# Patient Record
Sex: MALE | Race: WHITE | HISPANIC OR LATINO | URBAN - METROPOLITAN AREA
[De-identification: names, ages, dates, MRNs, and addresses within clinical notes are randomized per-mention and may not be internally consistent; named-entity substitution may affect disease eponyms.]

---

## 2019-11-20 ENCOUNTER — EMERGENCY (EMERGENCY)
Facility: HOSPITAL | Age: 64
LOS: 1 days | Discharge: HOME | End: 2019-11-20
Attending: EMERGENCY MEDICINE
Payer: MEDICARE

## 2019-11-20 VITALS
SYSTOLIC BLOOD PRESSURE: 125 MMHG | DIASTOLIC BLOOD PRESSURE: 85 MMHG | HEART RATE: 96 BPM | OXYGEN SATURATION: 100 % | TEMPERATURE: 98 F | HEIGHT: 63 IN | WEIGHT: 119.93 LBS | RESPIRATION RATE: 18 BRPM

## 2019-11-20 LAB
ACETONE SERPL-MCNC: ABNORMAL
ALBUMIN SERPL ELPH-MCNC: 3.8 G/DL — SIGNIFICANT CHANGE UP (ref 3.3–5.2)
ALP SERPL-CCNC: 195 U/L — HIGH (ref 40–120)
ALT FLD-CCNC: 17 U/L — SIGNIFICANT CHANGE UP
ANION GAP SERPL CALC-SCNC: 15 MMOL/L — SIGNIFICANT CHANGE UP (ref 5–17)
APPEARANCE UR: CLEAR — SIGNIFICANT CHANGE UP
AST SERPL-CCNC: 20 U/L — SIGNIFICANT CHANGE UP
BACTERIA # UR AUTO: ABNORMAL
BILIRUB SERPL-MCNC: 0.4 MG/DL — SIGNIFICANT CHANGE UP (ref 0.4–2)
BILIRUB UR-MCNC: NEGATIVE — SIGNIFICANT CHANGE UP
BUN SERPL-MCNC: 18 MG/DL — SIGNIFICANT CHANGE UP (ref 8–20)
CALCIUM SERPL-MCNC: 8.9 MG/DL — SIGNIFICANT CHANGE UP (ref 8.6–10.2)
CHLORIDE SERPL-SCNC: 90 MMOL/L — LOW (ref 98–107)
CO2 SERPL-SCNC: 26 MMOL/L — SIGNIFICANT CHANGE UP (ref 22–29)
COLOR SPEC: YELLOW — SIGNIFICANT CHANGE UP
CREAT SERPL-MCNC: 0.69 MG/DL — SIGNIFICANT CHANGE UP (ref 0.5–1.3)
DIFF PNL FLD: ABNORMAL
EPI CELLS # UR: SIGNIFICANT CHANGE UP
GLUCOSE SERPL-MCNC: 606 MG/DL — CRITICAL HIGH (ref 70–115)
GLUCOSE UR QL: 1000 MG/DL
HCT VFR BLD CALC: 40.2 % — SIGNIFICANT CHANGE UP (ref 39–50)
HGB BLD-MCNC: 13.1 G/DL — SIGNIFICANT CHANGE UP (ref 13–17)
KETONES UR-MCNC: ABNORMAL
LACTATE BLDV-MCNC: 1.5 MMOL/L — SIGNIFICANT CHANGE UP (ref 0.5–2)
LEUKOCYTE ESTERASE UR-ACNC: ABNORMAL
MAGNESIUM SERPL-MCNC: 2.1 MG/DL — SIGNIFICANT CHANGE UP (ref 1.8–2.6)
MCHC RBC-ENTMCNC: 28.5 PG — SIGNIFICANT CHANGE UP (ref 27–34)
MCHC RBC-ENTMCNC: 32.6 GM/DL — SIGNIFICANT CHANGE UP (ref 32–36)
MCV RBC AUTO: 87.4 FL — SIGNIFICANT CHANGE UP (ref 80–100)
NITRITE UR-MCNC: NEGATIVE — SIGNIFICANT CHANGE UP
OSMOLALITY SERPL: 314 MOSMOL/KG — HIGH (ref 280–301)
PH UR: 6.5 — SIGNIFICANT CHANGE UP (ref 5–8)
PLATELET # BLD AUTO: 147 K/UL — LOW (ref 150–400)
POTASSIUM SERPL-MCNC: 5 MMOL/L — SIGNIFICANT CHANGE UP (ref 3.5–5.3)
POTASSIUM SERPL-SCNC: 5 MMOL/L — SIGNIFICANT CHANGE UP (ref 3.5–5.3)
PROT SERPL-MCNC: 7 G/DL — SIGNIFICANT CHANGE UP (ref 6.6–8.7)
PROT UR-MCNC: 30 MG/DL
RBC # BLD: 4.6 M/UL — SIGNIFICANT CHANGE UP (ref 4.2–5.8)
RBC # FLD: 12.7 % — SIGNIFICANT CHANGE UP (ref 10.3–14.5)
RBC CASTS # UR COMP ASSIST: SIGNIFICANT CHANGE UP /HPF (ref 0–4)
SODIUM SERPL-SCNC: 131 MMOL/L — LOW (ref 135–145)
SP GR SPEC: 1.01 — SIGNIFICANT CHANGE UP (ref 1.01–1.02)
TROPONIN T SERPL-MCNC: <0.01 NG/ML — SIGNIFICANT CHANGE UP (ref 0–0.06)
UROBILINOGEN FLD QL: NEGATIVE MG/DL — SIGNIFICANT CHANGE UP
WBC # BLD: 6.73 K/UL — SIGNIFICANT CHANGE UP (ref 3.8–10.5)
WBC # FLD AUTO: 6.73 K/UL — SIGNIFICANT CHANGE UP (ref 3.8–10.5)
WBC UR QL: >50

## 2019-11-20 PROCEDURE — 84484 ASSAY OF TROPONIN QUANT: CPT

## 2019-11-20 PROCEDURE — 99284 EMERGENCY DEPT VISIT MOD MDM: CPT

## 2019-11-20 PROCEDURE — 83605 ASSAY OF LACTIC ACID: CPT

## 2019-11-20 PROCEDURE — 93010 ELECTROCARDIOGRAM REPORT: CPT

## 2019-11-20 PROCEDURE — 36415 COLL VENOUS BLD VENIPUNCTURE: CPT

## 2019-11-20 PROCEDURE — 83735 ASSAY OF MAGNESIUM: CPT

## 2019-11-20 PROCEDURE — 96375 TX/PRO/DX INJ NEW DRUG ADDON: CPT

## 2019-11-20 PROCEDURE — 80053 COMPREHEN METABOLIC PANEL: CPT

## 2019-11-20 PROCEDURE — 82962 GLUCOSE BLOOD TEST: CPT

## 2019-11-20 PROCEDURE — 93005 ELECTROCARDIOGRAM TRACING: CPT

## 2019-11-20 PROCEDURE — 99284 EMERGENCY DEPT VISIT MOD MDM: CPT | Mod: 25

## 2019-11-20 PROCEDURE — 71046 X-RAY EXAM CHEST 2 VIEWS: CPT

## 2019-11-20 PROCEDURE — 96374 THER/PROPH/DIAG INJ IV PUSH: CPT

## 2019-11-20 PROCEDURE — 83930 ASSAY OF BLOOD OSMOLALITY: CPT

## 2019-11-20 PROCEDURE — 71046 X-RAY EXAM CHEST 2 VIEWS: CPT | Mod: 26

## 2019-11-20 PROCEDURE — 81001 URINALYSIS AUTO W/SCOPE: CPT

## 2019-11-20 PROCEDURE — 85027 COMPLETE CBC AUTOMATED: CPT

## 2019-11-20 PROCEDURE — 82009 KETONE BODYS QUAL: CPT

## 2019-11-20 RX ORDER — INSULIN HUMAN 100 [IU]/ML
6 INJECTION, SOLUTION SUBCUTANEOUS ONCE
Refills: 0 | Status: COMPLETED | OUTPATIENT
Start: 2019-11-20 | End: 2019-11-20

## 2019-11-20 RX ORDER — CEFTRIAXONE 500 MG/1
1000 INJECTION, POWDER, FOR SOLUTION INTRAMUSCULAR; INTRAVENOUS ONCE
Refills: 0 | Status: COMPLETED | OUTPATIENT
Start: 2019-11-20 | End: 2019-11-20

## 2019-11-20 RX ORDER — SODIUM CHLORIDE 9 MG/ML
1000 INJECTION INTRAMUSCULAR; INTRAVENOUS; SUBCUTANEOUS ONCE
Refills: 0 | Status: COMPLETED | OUTPATIENT
Start: 2019-11-20 | End: 2019-11-20

## 2019-11-20 RX ORDER — CEPHALEXIN 500 MG
1 CAPSULE ORAL
Qty: 28 | Refills: 0
Start: 2019-11-20 | End: 2019-11-26

## 2019-11-20 RX ADMIN — INSULIN HUMAN 6 UNIT(S): 100 INJECTION, SOLUTION SUBCUTANEOUS at 19:37

## 2019-11-20 RX ADMIN — SODIUM CHLORIDE 1000 MILLILITER(S): 9 INJECTION INTRAMUSCULAR; INTRAVENOUS; SUBCUTANEOUS at 17:39

## 2019-11-20 RX ADMIN — CEFTRIAXONE 100 MILLIGRAM(S): 500 INJECTION, POWDER, FOR SOLUTION INTRAMUSCULAR; INTRAVENOUS at 19:16

## 2019-11-20 RX ADMIN — SODIUM CHLORIDE 1000 MILLILITER(S): 9 INJECTION INTRAMUSCULAR; INTRAVENOUS; SUBCUTANEOUS at 17:40

## 2019-11-20 NOTE — ED ADULT NURSE REASSESSMENT NOTE - NS ED NURSE REASSESS COMMENT FT1
Received 64 yr old male high glucose iv infusing well zhang patient in good spirts in no distress insulin given at this time

## 2019-11-20 NOTE — ED ADULT TRIAGE NOTE - CHIEF COMPLAINT QUOTE
Pt states "I've been feeling very weak and I fell down a couple of times", denies pain, denies chest pain/SOB

## 2019-11-20 NOTE — ED STATDOCS - PROGRESS NOTE DETAILS
65 y/o M pt with PMHx of neuropathy, HTN, DM presents to the ED c/o weakness. Reports weakness for several days and several episodes of syncope. During recent episode of syncope, wife caught patient before he hit the floor. Reports not having an appetite or eating much the past week. Denies chest pain, SOB. Denies hitting head. Pt will be placed in the main ED for complete evaluation by another provider.

## 2019-11-20 NOTE — ED PROVIDER NOTE - PATIENT PORTAL LINK FT
You can access the FollowMyHealth Patient Portal offered by [facility] by registering at the following website: [email or web address]. By joining [employer]’s FollowMyHealth portal, you will also be able to view your health information using other applications (apps) compatible with our system.

## 2019-11-20 NOTE — ED PROVIDER NOTE - OBJECTIVE STATEMENT
64yoM; with pmh signif for HTN, DM (insulin dependent); now p/w generalized malaise x3-4 days. decreased PO intake for 1 week 2/2 no appetite. denies cp/sob/palp. denies abd pain. denies n/v/d. denies f/c/s. denies headache. denies numnbess/tingling. denies focal weakness.  PMH: HTN, DM, CAD(s/p pci x1)  SOCIAL: No tobacco/illicit substance use/socialEtOH

## 2019-11-20 NOTE — ED PROVIDER NOTE - CARE PLAN
Principal Discharge DX:	Hyperglycemia without ketosis  Secondary Diagnosis:	UTI (urinary tract infection)

## 2019-12-01 ENCOUNTER — OUTPATIENT (OUTPATIENT)
Dept: OUTPATIENT SERVICES | Facility: HOSPITAL | Age: 64
LOS: 1 days | End: 2019-12-01
Payer: MEDICARE

## 2019-12-01 PROCEDURE — G9001: CPT

## 2019-12-10 DIAGNOSIS — Z71.89 OTHER SPECIFIED COUNSELING: ICD-10-CM

## 2020-05-20 ENCOUNTER — APPOINTMENT (OUTPATIENT)
Dept: VASCULAR SURGERY | Facility: CLINIC | Age: 65
End: 2020-05-20
Payer: MEDICARE

## 2020-05-20 VITALS
TEMPERATURE: 98 F | OXYGEN SATURATION: 99 % | BODY MASS INDEX: 22.5 KG/M2 | HEIGHT: 63 IN | DIASTOLIC BLOOD PRESSURE: 89 MMHG | SYSTOLIC BLOOD PRESSURE: 147 MMHG | HEART RATE: 92 BPM | WEIGHT: 127 LBS

## 2020-05-20 DIAGNOSIS — I73.9 PERIPHERAL VASCULAR DISEASE, UNSPECIFIED: ICD-10-CM

## 2020-05-20 PROCEDURE — 99202 OFFICE O/P NEW SF 15 MIN: CPT

## 2020-05-20 NOTE — PHYSICAL EXAM
[1+] : left 1+ [2+] : left 2+ [FreeTextEntry1] : Cap ref 2 secs.  [de-identified] : No open wounds.  [de-identified] : No paresthesias

## 2020-05-20 NOTE — HISTORY OF PRESENT ILLNESS
[FreeTextEntry1] : 63 yo m with hx of DM and neuropathy. Sent by PCP for PVD - screening eval. He is asymptomatic from LE standpoint. Walks and occasionally has pain on his big toes and legs. He just healed a wound on his L 1st toe without any problems.

## 2020-05-20 NOTE — ASSESSMENT
[FreeTextEntry1] : 63 YO M with hx of DM and paresthesias. No clinical evidence of PVD on PE. Recently healed a L toe wound.  [Foot care/Footwear] : foot care/footwear

## 2020-09-29 ENCOUNTER — EMERGENCY (EMERGENCY)
Facility: HOSPITAL | Age: 65
LOS: 1 days | Discharge: HOME | End: 2020-09-29
Attending: EMERGENCY MEDICINE
Payer: MEDICARE

## 2020-09-29 VITALS
HEART RATE: 100 BPM | WEIGHT: 119.93 LBS | RESPIRATION RATE: 20 BRPM | SYSTOLIC BLOOD PRESSURE: 158 MMHG | DIASTOLIC BLOOD PRESSURE: 97 MMHG | TEMPERATURE: 98 F | HEIGHT: 63 IN | OXYGEN SATURATION: 97 %

## 2020-09-29 VITALS
TEMPERATURE: 98 F | DIASTOLIC BLOOD PRESSURE: 78 MMHG | HEART RATE: 87 BPM | OXYGEN SATURATION: 98 % | RESPIRATION RATE: 20 BRPM | SYSTOLIC BLOOD PRESSURE: 146 MMHG

## 2020-09-29 LAB
ACETONE SERPL-MCNC: NEGATIVE — SIGNIFICANT CHANGE UP
ALBUMIN SERPL ELPH-MCNC: 3.7 G/DL — SIGNIFICANT CHANGE UP (ref 3.3–5.2)
ALP SERPL-CCNC: 84 U/L — SIGNIFICANT CHANGE UP (ref 40–120)
ALT FLD-CCNC: 10 U/L — SIGNIFICANT CHANGE UP
ANION GAP SERPL CALC-SCNC: 32 MMOL/L — HIGH (ref 5–17)
AST SERPL-CCNC: 28 U/L — SIGNIFICANT CHANGE UP
BASOPHILS # BLD AUTO: 0.01 K/UL — SIGNIFICANT CHANGE UP (ref 0–0.2)
BASOPHILS NFR BLD AUTO: 0.1 % — SIGNIFICANT CHANGE UP (ref 0–2)
BILIRUB SERPL-MCNC: 0.9 MG/DL — SIGNIFICANT CHANGE UP (ref 0.4–2)
BUN SERPL-MCNC: 31 MG/DL — HIGH (ref 8–20)
CALCIUM SERPL-MCNC: 8.8 MG/DL — SIGNIFICANT CHANGE UP (ref 8.6–10.2)
CHLORIDE SERPL-SCNC: 84 MMOL/L — LOW (ref 98–107)
CO2 SERPL-SCNC: 19 MMOL/L — LOW (ref 22–29)
CREAT SERPL-MCNC: 1.18 MG/DL — SIGNIFICANT CHANGE UP (ref 0.5–1.3)
EOSINOPHIL # BLD AUTO: 0 K/UL — SIGNIFICANT CHANGE UP (ref 0–0.5)
EOSINOPHIL NFR BLD AUTO: 0 % — SIGNIFICANT CHANGE UP (ref 0–6)
GAS PNL BLDV: SIGNIFICANT CHANGE UP
GLUCOSE SERPL-MCNC: 714 MG/DL — CRITICAL HIGH (ref 70–99)
HCO3 BLDV-SCNC: 21 MMOL/L — SIGNIFICANT CHANGE UP (ref 21–29)
HCT VFR BLD CALC: 41.2 % — SIGNIFICANT CHANGE UP (ref 39–50)
HGB BLD-MCNC: 13.6 G/DL — SIGNIFICANT CHANGE UP (ref 13–17)
IMM GRANULOCYTES NFR BLD AUTO: 0.6 % — SIGNIFICANT CHANGE UP (ref 0–1.5)
LIDOCAIN IGE QN: 17 U/L — LOW (ref 22–51)
LYMPHOCYTES # BLD AUTO: 0.95 K/UL — LOW (ref 1–3.3)
LYMPHOCYTES # BLD AUTO: 10.6 % — LOW (ref 13–44)
MCHC RBC-ENTMCNC: 27.9 PG — SIGNIFICANT CHANGE UP (ref 27–34)
MCHC RBC-ENTMCNC: 33 GM/DL — SIGNIFICANT CHANGE UP (ref 32–36)
MCV RBC AUTO: 84.6 FL — SIGNIFICANT CHANGE UP (ref 80–100)
MONOCYTES # BLD AUTO: 0.61 K/UL — SIGNIFICANT CHANGE UP (ref 0–0.9)
MONOCYTES NFR BLD AUTO: 6.8 % — SIGNIFICANT CHANGE UP (ref 2–14)
NEUTROPHILS # BLD AUTO: 7.38 K/UL — SIGNIFICANT CHANGE UP (ref 1.8–7.4)
NEUTROPHILS NFR BLD AUTO: 81.9 % — HIGH (ref 43–77)
PCO2 BLDV: 41 MMHG — SIGNIFICANT CHANGE UP (ref 35–50)
PH BLDV: 7.33 — SIGNIFICANT CHANGE UP (ref 7.32–7.43)
PLATELET # BLD AUTO: 146 K/UL — LOW (ref 150–400)
PO2 BLDV: 57 MMHG — HIGH (ref 25–45)
POTASSIUM SERPL-MCNC: 3.9 MMOL/L — SIGNIFICANT CHANGE UP (ref 3.5–5.3)
POTASSIUM SERPL-SCNC: 3.9 MMOL/L — SIGNIFICANT CHANGE UP (ref 3.5–5.3)
PROT SERPL-MCNC: 7.5 G/DL — SIGNIFICANT CHANGE UP (ref 6.6–8.7)
RBC # BLD: 4.87 M/UL — SIGNIFICANT CHANGE UP (ref 4.2–5.8)
RBC # FLD: 14.4 % — SIGNIFICANT CHANGE UP (ref 10.3–14.5)
SAO2 % BLDV: 86 % — SIGNIFICANT CHANGE UP
SODIUM SERPL-SCNC: 135 MMOL/L — SIGNIFICANT CHANGE UP (ref 135–145)
TSH SERPL-MCNC: 6.05 UIU/ML — HIGH (ref 0.27–4.2)
WBC # BLD: 9 K/UL — SIGNIFICANT CHANGE UP (ref 3.8–10.5)
WBC # FLD AUTO: 9 K/UL — SIGNIFICANT CHANGE UP (ref 3.8–10.5)

## 2020-09-29 PROCEDURE — 93010 ELECTROCARDIOGRAM REPORT: CPT

## 2020-09-29 PROCEDURE — 99284 EMERGENCY DEPT VISIT MOD MDM: CPT | Mod: 25

## 2020-09-29 PROCEDURE — 71045 X-RAY EXAM CHEST 1 VIEW: CPT | Mod: 26

## 2020-09-29 PROCEDURE — 99285 EMERGENCY DEPT VISIT HI MDM: CPT

## 2020-09-29 PROCEDURE — 71045 X-RAY EXAM CHEST 1 VIEW: CPT

## 2020-09-29 PROCEDURE — 36415 COLL VENOUS BLD VENIPUNCTURE: CPT

## 2020-09-29 PROCEDURE — 96374 THER/PROPH/DIAG INJ IV PUSH: CPT

## 2020-09-29 PROCEDURE — 82803 BLOOD GASES ANY COMBINATION: CPT

## 2020-09-29 PROCEDURE — 83690 ASSAY OF LIPASE: CPT

## 2020-09-29 PROCEDURE — 80053 COMPREHEN METABOLIC PANEL: CPT

## 2020-09-29 PROCEDURE — 84443 ASSAY THYROID STIM HORMONE: CPT

## 2020-09-29 PROCEDURE — 82009 KETONE BODYS QUAL: CPT

## 2020-09-29 PROCEDURE — 82962 GLUCOSE BLOOD TEST: CPT

## 2020-09-29 PROCEDURE — 85025 COMPLETE CBC W/AUTO DIFF WBC: CPT

## 2020-09-29 PROCEDURE — 93005 ELECTROCARDIOGRAM TRACING: CPT

## 2020-09-29 RX ORDER — ONDANSETRON 8 MG/1
1 TABLET, FILM COATED ORAL
Qty: 9 | Refills: 0
Start: 2020-09-29 | End: 2020-10-01

## 2020-09-29 RX ORDER — INSULIN HUMAN 100 [IU]/ML
12 INJECTION, SOLUTION SUBCUTANEOUS ONCE
Refills: 0 | Status: COMPLETED | OUTPATIENT
Start: 2020-09-29 | End: 2020-09-29

## 2020-09-29 RX ORDER — SODIUM CHLORIDE 9 MG/ML
2000 INJECTION, SOLUTION INTRAVENOUS ONCE
Refills: 0 | Status: COMPLETED | OUTPATIENT
Start: 2020-09-29 | End: 2020-09-29

## 2020-09-29 RX ORDER — ONDANSETRON 8 MG/1
4 TABLET, FILM COATED ORAL ONCE
Refills: 0 | Status: COMPLETED | OUTPATIENT
Start: 2020-09-29 | End: 2020-09-29

## 2020-09-29 RX ADMIN — INSULIN HUMAN 12 UNIT(S): 100 INJECTION, SOLUTION SUBCUTANEOUS at 03:18

## 2020-09-29 RX ADMIN — SODIUM CHLORIDE 4000 MILLILITER(S): 9 INJECTION, SOLUTION INTRAVENOUS at 01:53

## 2020-09-29 RX ADMIN — ONDANSETRON 4 MILLIGRAM(S): 8 TABLET, FILM COATED ORAL at 01:53

## 2020-09-29 RX ADMIN — INSULIN HUMAN 12 UNIT(S): 100 INJECTION, SOLUTION SUBCUTANEOUS at 02:08

## 2020-09-29 NOTE — ED ADULT NURSE NOTE - OBJECTIVE STATEMENT
Pt. presents alert and oriented x 4 to ED complaining of N/V and dizziness x 3 days. Pt. reports he has not taken his PO medications due to the vomiting, or taken is insulin because he is "too weak." Pt. reports he does not check his blood glucose at home. Noted dark brown emesis. Pt. denies fevers, body aches, chills. Pt. WREN, skin warm and dry, cap refill < 2 seconds. Pt. in NSR on cardiac monitor. Breathing spontaneous and unlabored on room air. VSS.

## 2020-09-29 NOTE — ED PROVIDER NOTE - CLINICAL SUMMARY MEDICAL DECISION MAKING FREE TEXT BOX
Rule out DKA; Fluid resuscitation ekg nml blood sugar improved has insulin at home tolerating po fluids f/u pcp later today without fail advised to pt and pt lesley antiemetics written for like GE abd soft nt nd return to ed for intractable chest pain sob abd pain or unable to tolerate po fluids pt and pts jonathan agree to plan of care

## 2020-09-29 NOTE — ED PROVIDER NOTE - PATIENT PORTAL LINK FT
You can access the FollowMyHealth Patient Portal offered by [facility] by registering at the following website: [email or web address]. By joining [employer]’s FollowMyHealth portal, you will also be able to view your health information using other applications (apps) compatible with our system.

## 2020-09-29 NOTE — ED ADULT TRIAGE NOTE - CHIEF COMPLAINT QUOTE
patient from home with complaints of difficulty breathing which started 2 days ago along with vomiting. patient states that he is supposed to take insulin but hasn't taken it in 3 days because he is so weak. denies any complaints of pain or discomfort at this time.

## 2020-09-29 NOTE — ED PROVIDER NOTE - OBJECTIVE STATEMENT
66 y/o male with PMHx of CAD, Depression, DM, and HTN on Novalog presents to ED c/o difficulty breathing. Patient reports 2-3 days of dizziness, N/V/D. Patient was in [facility] for the same issue in November 2019. Patient has not been able to take his medications because of the vomiting.    denies fever. denies HA or neck pain. no chest pain or sob. no abd pain. no urinary f/u/d. no back pain. no motor or sensory deficits. denies illicit drug use. no recent travel. no rash. no other acute issues symptoms or concerns

## 2020-09-29 NOTE — ED ADULT NURSE NOTE - CHPI ED NUR SYMPTOMS NEG
no burning urination/no abdominal distension/no blood in stool/no chills/no diarrhea/no dysuria/no fever/no hematuria

## 2020-09-29 NOTE — ED PROVIDER NOTE - ENMT, MLM
Airway patent, Nasal mucosa clear. Dry oral mucous membranes. Throat has no vesicles, no oropharyngeal exudates and uvula is midline.

## 2020-09-29 NOTE — ED PROVIDER NOTE - PMH
CAD (Coronary Artery Disease)  LAD occlusion  Depression    Diabetes    HTN (Hypertension)  controled with meds

## 2020-12-29 DIAGNOSIS — Z01.818 ENCOUNTER FOR OTHER PREPROCEDURAL EXAMINATION: ICD-10-CM

## 2020-12-31 NOTE — H&P PST ADULT - NSICDXPROBLEM_GEN_ALL_CORE_FT
PROBLEM DIAGNOSES  Problem: Abnormal nuclear stress test  Assessment and Plan: [facility]

## 2020-12-31 NOTE — H&P PST ADULT - HISTORY OF PRESENT ILLNESS
65 year old male with h/o HTN, HLD, DM, CAD with remote single-vessel CABG and cardiomyopathy with CHF with recent admission to [facility] for systolic heart failure and started on Entresto. Pt underwent a NST which was positive.  For LHC to assess coronary anatomy and possible cause of cardiomyopathy.     Symptoms:        Angina (Class):        Ischemic Symptoms:     Heart Failure:        Systolic/Diastolic/Combined:        NYHA Class (within 2 weeks):     Assessment of LVEF:       EF: 33%       Assessed by: NST       Date: 10/02/20    Prior Cardiac Interventions:       PCI's:        CABG: single vessel CABG    Noninvasive Testing:   Stress Test: Date: 10/2/20       Protocol: Reggadenoson        Symptoms: SOB, nausea       EKG Changes: none       Myocardial Imaging: mid to basal inferior and basal inferoseptal infarction with small area of mild inferoseptal evan-infarct ischemia.  Global hypokinesis.  EF 33%       Risk Assessment: Moderate      Antianginal Therapies:        Beta Blockers:         Calcium Channel Blockers:        Long Acting Nitrates:        Ranexa:     Associated Risk Factors:        Cerebrovascular Disease: N/A       Chronic Lung Disease: N/A       Peripheral Arterial Disease: N/A       Chronic Kidney Disease (if yes, what is GFR): N/A       Uncontrolled Diabetes (if yes, what is HgbA1C or FBS): N/A       Poorly Controlled Hypertension (if yes, what is SBP): N/A       Morbid Obesity (if yes, what is BMI): N/A       History of Recent Ventricular Arrhythmia: N/A       Inability to Ambulate Safely: N/A       Need for Therapeutic Anticoagulation: N/A       Antiplatelet or Contrast Allergy: N/A 65 year old male with h/o HTN, HLD, DM, CAD s/p cardiac cath 4/14/10 with confirmed LAD occlusion now s/p single-vessel CABG 4/2010 with Dr. Lafleur, cardiomyopathy with CHF with recent admission to [facility] for systolic heart failure and started on Entresto. Pt underwent a NST which was positive.  For [facility] to assess coronary anatomy and possible cause of cardiomyopathy.     Symptoms:        Angina (Class):        Ischemic Symptoms:     Heart Failure:        Systolic/Diastolic/Combined:        NYHA Class (within 2 weeks):     Assessment of LVEF:       EF: 33%       Assessed by: NST       Date: 10/02/20    Prior Cardiac Interventions:       PCI's:        CABG: single vessel CABG    Noninvasive Testing:   Stress Test: Date: 10/2/20       Protocol: Reggadenoson        Symptoms: SOB, nausea       EKG Changes: none       Myocardial Imaging: mid to basal inferior and basal inferoseptal infarction with small area of mild inferoseptal evan-infarct ischemia.  Global hypokinesis.  EF 33%       Risk Assessment: Moderate      Antianginal Therapies:        Beta Blockers:         Calcium Channel Blockers:        Long Acting Nitrates:        Ranexa:     Associated Risk Factors:        Cerebrovascular Disease: N/A       Chronic Lung Disease: N/A       Peripheral Arterial Disease: N/A       Chronic Kidney Disease (if yes, what is GFR): N/A       Uncontrolled Diabetes (if yes, what is HgbA1C or FBS): N/A       Poorly Controlled Hypertension (if yes, what is SBP): N/A       Morbid Obesity (if yes, what is BMI): N/A       History of Recent Ventricular Arrhythmia: N/A       Inability to Ambulate Safely: N/A       Need for Therapeutic Anticoagulation: N/A       Antiplatelet or Contrast Allergy: N/A 65 year old male with h/o HTN, HLD, DM, CAD s/p cardiac cath 4/14/10 with confirmed LAD occlusion now s/p single-vessel CABG 4/2010 with Dr. Lafleur, cardiomyopathy with CHF with recent admission to [facility] for systolic heart failure and started on Entresto. Pt underwent a NST which which showed global hypokinesis of the remaining wall segments and small reversible defect of mild intensity of the inferoseptal wall consistent with per-infarct ischemia.  For [facility] to assess coronary anatomy and possible cause of cardiomyopathy.       ASA  Mallampati   GFR  Creat  Bleeding Risk  COVID-19  negative 1/2/21     Symptoms:        Angina (Class):        Ischemic Symptoms:     Heart Failure:        Systolic/Diastolic/Combined:        NYHA Class (within 2 weeks):     Assessment of LVEF:       EF: 33%       Assessed by: NST       Date: 10/02/20    Prior Cardiac Interventions:       PCI's:        CABG: single vessel CABG    Noninvasive Testing:   Stress Test: Date: 10/2/20       Protocol: Reggadenoson        Symptoms: SOB, nausea       EKG Changes: none       Myocardial Imaging: mid to basal inferior and basal inferoseptal infarction with small area of mild inferoseptal evan-infarct ischemia.  Global hypokinesis.  EF 33%       Risk Assessment: Moderate      Antianginal Therapies:        Beta Blockers:         Calcium Channel Blockers:        Long Acting Nitrates:        Ranexa:     Associated Risk Factors:        Cerebrovascular Disease: N/A       Chronic Lung Disease: N/A       Peripheral Arterial Disease: N/A       Chronic Kidney Disease (if yes, what is GFR): N/A       Uncontrolled Diabetes (if yes, what is HgbA1C or FBS): N/A       Poorly Controlled Hypertension (if yes, what is SBP): N/A       Morbid Obesity (if yes, what is BMI): N/A       History of Recent Ventricular Arrhythmia: N/A       Inability to Ambulate Safely: N/A       Need for Therapeutic Anticoagulation: N/A       Antiplatelet or Contrast Allergy: N/A 65 year old male with h/o HTN, HLD, DM, CAD s/p cardiac cath 4/14/10 with confirmed LAD occlusion now s/p single-vessel CABG 4/2010 with Dr. Lafleur, cardiomyopathy with CHF with recent admission to [facility] for systolic heart failure and started on Entresto. Pt underwent a NST which which showed global hypokinesis of the remaining wall segments and small reversible defect of mild intensity of the inferoseptal wall consistent with per-infarct ischemia.  For [facility] to assess coronary anatomy and possible cause of cardiomyopathy.       ASA   2  Mallampati    2  GFR  Creat  Bleeding Risk  COVID-19  negative 1/2/21     Symptoms:        Angina (Class):        Ischemic Symptoms:     Heart Failure:        Systolic/Diastolic/Combined:        NYHA Class (within 2 weeks):     Assessment of LVEF:       EF: 33%       Assessed by: NST       Date: 10/02/20    Prior Cardiac Interventions:       PCI's:        CABG: single vessel CABG    Noninvasive Testing:   Stress Test: Date: 10/2/20       Protocol: Reggadenoson        Symptoms: SOB, nausea       EKG Changes: none       Myocardial Imaging: mid to basal inferior and basal inferoseptal infarction with small area of mild inferoseptal evan-infarct ischemia.  Global hypokinesis.  EF 33%       Risk Assessment: Moderate      Antianginal Therapies:        Beta Blockers:         Calcium Channel Blockers:        Long Acting Nitrates:        Ranexa:     Associated Risk Factors:        Cerebrovascular Disease: N/A       Chronic Lung Disease: N/A       Peripheral Arterial Disease: N/A       Chronic Kidney Disease (if yes, what is GFR): N/A       Uncontrolled Diabetes (if yes, what is HgbA1C or FBS): N/A       Poorly Controlled Hypertension (if yes, what is SBP): N/A       Morbid Obesity (if yes, what is BMI): N/A       History of Recent Ventricular Arrhythmia: N/A       Inability to Ambulate Safely: N/A       Need for Therapeutic Anticoagulation: N/A       Antiplatelet or Contrast Allergy: N/A

## 2020-12-31 NOTE — H&P PST ADULT - LAST STRESS TEST
10/2/20 mid to basal inferior and basal inferoseptal infarction with small area of mild inferoseptal evan-infarct ischemia.  Global hypokinesis.  EF 33%

## 2020-12-31 NOTE — H&P PST ADULT - NSICDXPASTSURGICALHX_GEN_ALL_CORE_FT
PAST SURGICAL HISTORY:  S/P CABG (coronary artery bypass graft)     S/P Inguinal Hernia Repair ishaan with mesh 2008

## 2020-12-31 NOTE — H&P PST ADULT - RS GEN PE MLT RESP DETAILS PC
airway patent/breath sounds equal/respirations non-labored/clear to auscultation bilaterally/no rhonchi/no wheezes

## 2020-12-31 NOTE — H&P PST ADULT - NSICDXPASTMEDICALHX_GEN_ALL_CORE_FT
PAST MEDICAL HISTORY:  Abnormal nuclear stress test     CAD (Coronary Artery Disease) LAD occlusion    Depression     Diabetes     HTN (Hypertension) controled with meds

## 2020-12-31 NOTE — H&P PST ADULT - ASSESSMENT
65 year old male with h/o HTN, HLD, DM, CABG and CHF with abnormal stress test for LHC to assess coronary anatomy and cause of EF 33%.    ASA  Mallampati  GFR  Bleeding  risk 65 year old male with h/o HTN, HLD, DM, CABG and CHF with abnormal stress test for [facility] to assess coronary anatomy and cause of EF 33%.    -Patient seen and examined  -Labs and EKG reviewed   -Pre-procedure teaching completed with patient, questions answered   -Informed consent to be obtained by attending   -pt instructed IF STENT PLACED WILL REQUIRE TO STAY OVERNIGHT FOR MONITORING AND DISCHARGED IN THE AM    -cardiac rehab info provided if stent is placed recommended to start if needed post PCI

## 2021-01-01 ENCOUNTER — APPOINTMENT (OUTPATIENT)
Dept: DISASTER EMERGENCY | Facility: CLINIC | Age: 66
End: 2021-01-01

## 2021-01-01 ENCOUNTER — LABORATORY RESULT (OUTPATIENT)
Age: 66
End: 2021-01-01

## 2021-01-01 ENCOUNTER — TRANSCRIPTION ENCOUNTER (OUTPATIENT)
Age: 66
End: 2021-01-01

## 2021-01-01 ENCOUNTER — APPOINTMENT (OUTPATIENT)
Dept: NEUROLOGY | Facility: CLINIC | Age: 66
End: 2021-01-01
Payer: MEDICARE

## 2021-01-01 ENCOUNTER — INPATIENT (INPATIENT)
Facility: HOSPITAL | Age: 66
LOS: 3 days | Discharge: HOME | DRG: 391 | End: 2021-12-20
Attending: HOSPITALIST | Admitting: INTERNAL MEDICINE
Payer: MEDICARE

## 2021-01-01 ENCOUNTER — APPOINTMENT (OUTPATIENT)
Dept: PULMONOLOGY | Facility: CLINIC | Age: 66
End: 2021-01-01

## 2021-01-01 ENCOUNTER — NON-APPOINTMENT (OUTPATIENT)
Age: 66
End: 2021-01-01

## 2021-01-01 ENCOUNTER — EMERGENCY (EMERGENCY)
Facility: HOSPITAL | Age: 66
LOS: 1 days | Discharge: HOME | End: 2021-01-01
Attending: EMERGENCY MEDICINE
Payer: MEDICARE

## 2021-01-01 ENCOUNTER — OUTPATIENT (OUTPATIENT)
Dept: OUTPATIENT SERVICES | Facility: HOSPITAL | Age: 66
LOS: 1 days | Discharge: HOME | End: 2021-01-01
Payer: MEDICARE

## 2021-01-01 ENCOUNTER — FORM ENCOUNTER (OUTPATIENT)
Age: 66
End: 2021-01-01

## 2021-01-01 ENCOUNTER — INPATIENT (INPATIENT)
Facility: HOSPITAL | Age: 66
LOS: 2 days | Discharge: HOME | DRG: 637 | End: 2022-01-02
Attending: STUDENT IN AN ORGANIZED HEALTH CARE EDUCATION/TRAINING PROGRAM | Admitting: INTERNAL MEDICINE
Payer: MEDICARE

## 2021-01-01 VITALS
OXYGEN SATURATION: 100 % | WEIGHT: 132.06 LBS | TEMPERATURE: 98 F | DIASTOLIC BLOOD PRESSURE: 76 MMHG | HEIGHT: 63 IN | HEART RATE: 88 BPM | SYSTOLIC BLOOD PRESSURE: 122 MMHG | RESPIRATION RATE: 18 BRPM

## 2021-01-01 VITALS
TEMPERATURE: 98 F | OXYGEN SATURATION: 99 % | DIASTOLIC BLOOD PRESSURE: 64 MMHG | HEIGHT: 63 IN | HEART RATE: 72 BPM | RESPIRATION RATE: 18 BRPM | WEIGHT: 133.16 LBS | SYSTOLIC BLOOD PRESSURE: 115 MMHG

## 2021-01-01 VITALS
HEART RATE: 94 BPM | BODY MASS INDEX: 23.57 KG/M2 | DIASTOLIC BLOOD PRESSURE: 71 MMHG | SYSTOLIC BLOOD PRESSURE: 127 MMHG | TEMPERATURE: 98 F | HEIGHT: 63 IN | OXYGEN SATURATION: 95 % | WEIGHT: 133 LBS

## 2021-01-01 VITALS
TEMPERATURE: 98 F | OXYGEN SATURATION: 99 % | WEIGHT: 134 LBS | HEART RATE: 80 BPM | BODY MASS INDEX: 23.74 KG/M2 | DIASTOLIC BLOOD PRESSURE: 65 MMHG | HEIGHT: 63 IN | SYSTOLIC BLOOD PRESSURE: 119 MMHG

## 2021-01-01 VITALS
HEART RATE: 81 BPM | TEMPERATURE: 98.2 F | BODY MASS INDEX: 23.74 KG/M2 | OXYGEN SATURATION: 97 % | SYSTOLIC BLOOD PRESSURE: 134 MMHG | HEIGHT: 63 IN | DIASTOLIC BLOOD PRESSURE: 70 MMHG | WEIGHT: 134 LBS

## 2021-01-01 VITALS
BODY MASS INDEX: 23.57 KG/M2 | OXYGEN SATURATION: 98 % | SYSTOLIC BLOOD PRESSURE: 100 MMHG | HEART RATE: 92 BPM | HEIGHT: 63 IN | DIASTOLIC BLOOD PRESSURE: 60 MMHG | TEMPERATURE: 97.8 F | WEIGHT: 133 LBS

## 2021-01-01 VITALS
OXYGEN SATURATION: 97 % | SYSTOLIC BLOOD PRESSURE: 143 MMHG | HEART RATE: 74 BPM | HEIGHT: 63 IN | DIASTOLIC BLOOD PRESSURE: 74 MMHG | TEMPERATURE: 98.4 F | BODY MASS INDEX: 23.74 KG/M2 | WEIGHT: 134 LBS

## 2021-01-01 VITALS
DIASTOLIC BLOOD PRESSURE: 74 MMHG | RESPIRATION RATE: 22 BRPM | OXYGEN SATURATION: 100 % | HEART RATE: 77 BPM | HEIGHT: 63 IN | SYSTOLIC BLOOD PRESSURE: 131 MMHG

## 2021-01-01 VITALS
SYSTOLIC BLOOD PRESSURE: 192 MMHG | HEART RATE: 95 BPM | WEIGHT: 156.97 LBS | HEIGHT: 63 IN | RESPIRATION RATE: 20 BRPM | TEMPERATURE: 99 F | OXYGEN SATURATION: 99 % | DIASTOLIC BLOOD PRESSURE: 112 MMHG

## 2021-01-01 VITALS
DIASTOLIC BLOOD PRESSURE: 75 MMHG | SYSTOLIC BLOOD PRESSURE: 164 MMHG | OXYGEN SATURATION: 98 % | TEMPERATURE: 98 F | HEART RATE: 85 BPM | RESPIRATION RATE: 17 BRPM

## 2021-01-01 VITALS
HEART RATE: 91 BPM | HEIGHT: 63 IN | TEMPERATURE: 98.2 F | BODY MASS INDEX: 23.39 KG/M2 | WEIGHT: 132 LBS | SYSTOLIC BLOOD PRESSURE: 138 MMHG | OXYGEN SATURATION: 98 % | DIASTOLIC BLOOD PRESSURE: 81 MMHG

## 2021-01-01 DIAGNOSIS — Z86.79 PERSONAL HISTORY OF OTHER DISEASES OF THE CIRCULATORY SYSTEM: ICD-10-CM

## 2021-01-01 DIAGNOSIS — G40.802 OTHER EPILEPSY, NOT INTRACTABLE, W/OUT STATUS EPILEPTICUS: ICD-10-CM

## 2021-01-01 DIAGNOSIS — Z95.1 PRESENCE OF AORTOCORONARY BYPASS GRAFT: Chronic | ICD-10-CM

## 2021-01-01 DIAGNOSIS — I34.0 NONRHEUMATIC MITRAL (VALVE) INSUFFICIENCY: ICD-10-CM

## 2021-01-01 DIAGNOSIS — Z86.69 PERSONAL HISTORY OF OTHER DISEASES OF THE NERVOUS SYSTEM AND SENSE ORGANS: ICD-10-CM

## 2021-01-01 DIAGNOSIS — H54.7 UNSPECIFIED VISUAL LOSS: ICD-10-CM

## 2021-01-01 DIAGNOSIS — R11.10 VOMITING, UNSPECIFIED: ICD-10-CM

## 2021-01-01 DIAGNOSIS — I63.9 CEREBRAL INFARCTION, UNSPECIFIED: ICD-10-CM

## 2021-01-01 DIAGNOSIS — Z79.4 TYPE 2 DIABETES MELLITUS WITH DIABETIC POLYNEUROPATHY: ICD-10-CM

## 2021-01-01 DIAGNOSIS — R55 SYNCOPE AND COLLAPSE: ICD-10-CM

## 2021-01-01 DIAGNOSIS — Z97.3 PRESENCE OF SPECTACLES AND CONTACT LENSES: ICD-10-CM

## 2021-01-01 DIAGNOSIS — E11.42 TYPE 2 DIABETES MELLITUS WITH DIABETIC POLYNEUROPATHY: ICD-10-CM

## 2021-01-01 DIAGNOSIS — Z78.9 OTHER SPECIFIED HEALTH STATUS: ICD-10-CM

## 2021-01-01 DIAGNOSIS — E16.2 HYPOGLYCEMIA, UNSPECIFIED: ICD-10-CM

## 2021-01-01 LAB
A1C WITH ESTIMATED AVERAGE GLUCOSE RESULT: 12 % — HIGH (ref 4–5.6)
ACETONE SERPL-MCNC: ABNORMAL
ALBUMIN SERPL ELPH-MCNC: 2.4 G/DL — LOW (ref 3.3–5.2)
ALBUMIN SERPL ELPH-MCNC: 2.7 G/DL — LOW (ref 3.3–5.2)
ALBUMIN SERPL ELPH-MCNC: 2.7 G/DL — LOW (ref 3.3–5.2)
ALBUMIN SERPL ELPH-MCNC: 3.1 G/DL — LOW (ref 3.3–5.2)
ALP SERPL-CCNC: 105 U/L — SIGNIFICANT CHANGE UP (ref 40–120)
ALP SERPL-CCNC: 67 U/L — SIGNIFICANT CHANGE UP (ref 40–120)
ALP SERPL-CCNC: 73 U/L — SIGNIFICANT CHANGE UP (ref 40–120)
ALP SERPL-CCNC: 75 U/L — SIGNIFICANT CHANGE UP (ref 40–120)
ALT FLD-CCNC: 16 U/L — SIGNIFICANT CHANGE UP
ALT FLD-CCNC: 28 U/L — SIGNIFICANT CHANGE UP
ALT FLD-CCNC: 29 U/L — SIGNIFICANT CHANGE UP
ALT FLD-CCNC: 35 U/L — SIGNIFICANT CHANGE UP
AMMONIA BLD-MCNC: 14 UMOL/L — SIGNIFICANT CHANGE UP (ref 11–55)
ANION GAP SERPL CALC-SCNC: 10 MMOL/L — SIGNIFICANT CHANGE UP (ref 5–17)
ANION GAP SERPL CALC-SCNC: 11 MMOL/L — SIGNIFICANT CHANGE UP (ref 5–17)
ANION GAP SERPL CALC-SCNC: 13 MMOL/L — SIGNIFICANT CHANGE UP (ref 5–17)
ANION GAP SERPL CALC-SCNC: 13 MMOL/L — SIGNIFICANT CHANGE UP (ref 5–17)
ANION GAP SERPL CALC-SCNC: 15 MMOL/L — SIGNIFICANT CHANGE UP (ref 5–17)
ANION GAP SERPL CALC-SCNC: 15 MMOL/L — SIGNIFICANT CHANGE UP (ref 5–17)
ANION GAP SERPL CALC-SCNC: 19 MMOL/L — HIGH (ref 5–17)
ANION GAP SERPL CALC-SCNC: 20 MMOL/L — HIGH (ref 5–17)
ANION GAP SERPL CALC-SCNC: 23 MMOL/L — HIGH (ref 5–17)
ANION GAP SERPL CALC-SCNC: 7 MMOL/L — SIGNIFICANT CHANGE UP (ref 5–17)
ANION GAP SERPL CALC-SCNC: 9 MMOL/L — SIGNIFICANT CHANGE UP (ref 5–17)
APPEARANCE UR: CLEAR — SIGNIFICANT CHANGE UP
APTT BLD: 30.9 SEC — SIGNIFICANT CHANGE UP (ref 27.5–35.5)
APTT BLD: 32.2 SEC — SIGNIFICANT CHANGE UP (ref 27.5–35.5)
AST SERPL-CCNC: 119 U/L — HIGH
AST SERPL-CCNC: 32 U/L — SIGNIFICANT CHANGE UP
AST SERPL-CCNC: 44 U/L — HIGH
AST SERPL-CCNC: 86 U/L — HIGH
BACTERIA # UR AUTO: ABNORMAL
BASE EXCESS BLDV CALC-SCNC: -6.8 MMOL/L — LOW (ref -2–3)
BASOPHILS # BLD AUTO: 0.02 K/UL — SIGNIFICANT CHANGE UP (ref 0–0.2)
BASOPHILS # BLD AUTO: 0.03 K/UL — SIGNIFICANT CHANGE UP (ref 0–0.2)
BASOPHILS # BLD AUTO: 0.04 K/UL — SIGNIFICANT CHANGE UP (ref 0–0.2)
BASOPHILS # BLD AUTO: 0.05 K/UL — SIGNIFICANT CHANGE UP (ref 0–0.2)
BASOPHILS # BLD AUTO: 0.07 K/UL — SIGNIFICANT CHANGE UP (ref 0–0.2)
BASOPHILS NFR BLD AUTO: 0.2 % — SIGNIFICANT CHANGE UP (ref 0–2)
BASOPHILS NFR BLD AUTO: 0.3 % — SIGNIFICANT CHANGE UP (ref 0–2)
BASOPHILS NFR BLD AUTO: 0.4 % — SIGNIFICANT CHANGE UP (ref 0–2)
BASOPHILS NFR BLD AUTO: 0.7 % — SIGNIFICANT CHANGE UP (ref 0–2)
BASOPHILS NFR BLD AUTO: 0.9 % — SIGNIFICANT CHANGE UP (ref 0–2)
BILIRUB SERPL-MCNC: 0.3 MG/DL — LOW (ref 0.4–2)
BILIRUB SERPL-MCNC: 0.3 MG/DL — LOW (ref 0.4–2)
BILIRUB SERPL-MCNC: 0.4 MG/DL — SIGNIFICANT CHANGE UP (ref 0.4–2)
BILIRUB SERPL-MCNC: 0.6 MG/DL — SIGNIFICANT CHANGE UP (ref 0.4–2)
BILIRUB UR-MCNC: NEGATIVE — SIGNIFICANT CHANGE UP
BUN SERPL-MCNC: 11.1 MG/DL — SIGNIFICANT CHANGE UP (ref 8–20)
BUN SERPL-MCNC: 14.9 MG/DL — SIGNIFICANT CHANGE UP (ref 8–20)
BUN SERPL-MCNC: 29.2 MG/DL — HIGH (ref 8–20)
BUN SERPL-MCNC: 34 MG/DL — HIGH (ref 8–20)
BUN SERPL-MCNC: 36.8 MG/DL — HIGH (ref 8–20)
BUN SERPL-MCNC: 36.9 MG/DL — HIGH (ref 8–20)
BUN SERPL-MCNC: 37.1 MG/DL — HIGH (ref 8–20)
BUN SERPL-MCNC: 38.2 MG/DL — HIGH (ref 8–20)
BUN SERPL-MCNC: 39.9 MG/DL — HIGH (ref 8–20)
BUN SERPL-MCNC: 40.7 MG/DL — HIGH (ref 8–20)
BUN SERPL-MCNC: 44.5 MG/DL — HIGH (ref 8–20)
CALCIUM SERPL-MCNC: 7.4 MG/DL — LOW (ref 8.6–10.2)
CALCIUM SERPL-MCNC: 7.4 MG/DL — LOW (ref 8.6–10.2)
CALCIUM SERPL-MCNC: 7.5 MG/DL — LOW (ref 8.6–10.2)
CALCIUM SERPL-MCNC: 7.6 MG/DL — LOW (ref 8.6–10.2)
CALCIUM SERPL-MCNC: 7.6 MG/DL — LOW (ref 8.6–10.2)
CALCIUM SERPL-MCNC: 7.8 MG/DL — LOW (ref 8.6–10.2)
CALCIUM SERPL-MCNC: 7.9 MG/DL — LOW (ref 8.6–10.2)
CALCIUM SERPL-MCNC: 7.9 MG/DL — LOW (ref 8.6–10.2)
CALCIUM SERPL-MCNC: 8.5 MG/DL — LOW (ref 8.6–10.2)
CALCIUM SERPL-MCNC: 8.7 MG/DL — SIGNIFICANT CHANGE UP (ref 8.6–10.2)
CALCIUM SERPL-MCNC: 8.8 MG/DL — SIGNIFICANT CHANGE UP (ref 8.6–10.2)
CHLORIDE SERPL-SCNC: 101 MMOL/L — SIGNIFICANT CHANGE UP (ref 98–107)
CHLORIDE SERPL-SCNC: 102 MMOL/L — SIGNIFICANT CHANGE UP (ref 98–107)
CHLORIDE SERPL-SCNC: 103 MMOL/L — SIGNIFICANT CHANGE UP (ref 98–107)
CHLORIDE SERPL-SCNC: 104 MMOL/L — SIGNIFICANT CHANGE UP (ref 98–107)
CHLORIDE SERPL-SCNC: 105 MMOL/L — SIGNIFICANT CHANGE UP (ref 98–107)
CHLORIDE SERPL-SCNC: 105 MMOL/L — SIGNIFICANT CHANGE UP (ref 98–107)
CHLORIDE SERPL-SCNC: 106 MMOL/L — SIGNIFICANT CHANGE UP (ref 98–107)
CHLORIDE SERPL-SCNC: 107 MMOL/L — SIGNIFICANT CHANGE UP (ref 98–107)
CHLORIDE SERPL-SCNC: 93 MMOL/L — LOW (ref 98–107)
CHLORIDE SERPL-SCNC: 96 MMOL/L — LOW (ref 98–107)
CHLORIDE SERPL-SCNC: 97 MMOL/L — LOW (ref 98–107)
CHOLEST SERPL-MCNC: 125 MG/DL — SIGNIFICANT CHANGE UP
CO2 SERPL-SCNC: 17 MMOL/L — LOW (ref 22–29)
CO2 SERPL-SCNC: 18 MMOL/L — LOW (ref 22–29)
CO2 SERPL-SCNC: 19 MMOL/L — LOW (ref 22–29)
CO2 SERPL-SCNC: 20 MMOL/L — LOW (ref 22–29)
CO2 SERPL-SCNC: 22 MMOL/L — SIGNIFICANT CHANGE UP (ref 22–29)
CO2 SERPL-SCNC: 24 MMOL/L — SIGNIFICANT CHANGE UP (ref 22–29)
CO2 SERPL-SCNC: 25 MMOL/L — SIGNIFICANT CHANGE UP (ref 22–29)
CO2 SERPL-SCNC: 26 MMOL/L — SIGNIFICANT CHANGE UP (ref 22–29)
CO2 SERPL-SCNC: 32 MMOL/L — HIGH (ref 22–29)
COLOR SPEC: YELLOW — SIGNIFICANT CHANGE UP
CREAT SERPL-MCNC: 0.87 MG/DL — SIGNIFICANT CHANGE UP (ref 0.5–1.3)
CREAT SERPL-MCNC: 0.87 MG/DL — SIGNIFICANT CHANGE UP (ref 0.5–1.3)
CREAT SERPL-MCNC: 1.09 MG/DL — SIGNIFICANT CHANGE UP (ref 0.5–1.3)
CREAT SERPL-MCNC: 1.15 MG/DL — SIGNIFICANT CHANGE UP (ref 0.5–1.3)
CREAT SERPL-MCNC: 1.2 MG/DL — SIGNIFICANT CHANGE UP (ref 0.5–1.3)
CREAT SERPL-MCNC: 1.21 MG/DL — SIGNIFICANT CHANGE UP (ref 0.5–1.3)
CREAT SERPL-MCNC: 1.26 MG/DL — SIGNIFICANT CHANGE UP (ref 0.5–1.3)
CREAT SERPL-MCNC: 1.45 MG/DL — HIGH (ref 0.5–1.3)
CREAT SERPL-MCNC: 1.48 MG/DL — HIGH (ref 0.5–1.3)
CREAT SERPL-MCNC: 1.53 MG/DL — HIGH (ref 0.5–1.3)
CREAT SERPL-MCNC: 1.63 MG/DL — HIGH (ref 0.5–1.3)
CRP SERPL-MCNC: 28 MG/L — HIGH
CULTURE RESULTS: SIGNIFICANT CHANGE UP
DIFF PNL FLD: ABNORMAL
EOSINOPHIL # BLD AUTO: 0 K/UL — SIGNIFICANT CHANGE UP (ref 0–0.5)
EOSINOPHIL # BLD AUTO: 0.01 K/UL — SIGNIFICANT CHANGE UP (ref 0–0.5)
EOSINOPHIL # BLD AUTO: 0.11 K/UL — SIGNIFICANT CHANGE UP (ref 0–0.5)
EOSINOPHIL # BLD AUTO: 0.11 K/UL — SIGNIFICANT CHANGE UP (ref 0–0.5)
EOSINOPHIL # BLD AUTO: 0.23 K/UL — SIGNIFICANT CHANGE UP (ref 0–0.5)
EOSINOPHIL NFR BLD AUTO: 0 % — SIGNIFICANT CHANGE UP (ref 0–6)
EOSINOPHIL NFR BLD AUTO: 0.1 % — SIGNIFICANT CHANGE UP (ref 0–6)
EOSINOPHIL NFR BLD AUTO: 1.4 % — SIGNIFICANT CHANGE UP (ref 0–6)
EOSINOPHIL NFR BLD AUTO: 1.5 % — SIGNIFICANT CHANGE UP (ref 0–6)
EOSINOPHIL NFR BLD AUTO: 3 % — SIGNIFICANT CHANGE UP (ref 0–6)
EPI CELLS # UR: SIGNIFICANT CHANGE UP
ERYTHROCYTE [SEDIMENTATION RATE] IN BLOOD: 51 MM/HR — HIGH (ref 0–20)
ESTIMATED AVERAGE GLUCOSE: 298 MG/DL — HIGH (ref 68–114)
GAS PNL BLDA: SIGNIFICANT CHANGE UP
GAS PNL BLDA: SIGNIFICANT CHANGE UP
GLUCOSE BLDC GLUCOMTR-MCNC: 103 MG/DL — HIGH (ref 70–99)
GLUCOSE BLDC GLUCOMTR-MCNC: 105 MG/DL — HIGH (ref 70–99)
GLUCOSE BLDC GLUCOMTR-MCNC: 105 MG/DL — HIGH (ref 70–99)
GLUCOSE BLDC GLUCOMTR-MCNC: 106 MG/DL — HIGH (ref 70–99)
GLUCOSE BLDC GLUCOMTR-MCNC: 115 MG/DL — HIGH (ref 70–99)
GLUCOSE BLDC GLUCOMTR-MCNC: 116 MG/DL — HIGH (ref 70–99)
GLUCOSE BLDC GLUCOMTR-MCNC: 116 MG/DL — HIGH (ref 70–99)
GLUCOSE BLDC GLUCOMTR-MCNC: 123 MG/DL — HIGH (ref 70–99)
GLUCOSE BLDC GLUCOMTR-MCNC: 125 MG/DL — HIGH (ref 70–99)
GLUCOSE BLDC GLUCOMTR-MCNC: 125 MG/DL — HIGH (ref 70–99)
GLUCOSE BLDC GLUCOMTR-MCNC: 128 MG/DL — HIGH (ref 70–99)
GLUCOSE BLDC GLUCOMTR-MCNC: 130 MG/DL — HIGH (ref 70–99)
GLUCOSE BLDC GLUCOMTR-MCNC: 136 MG/DL — HIGH (ref 70–99)
GLUCOSE BLDC GLUCOMTR-MCNC: 137 MG/DL — HIGH (ref 70–99)
GLUCOSE BLDC GLUCOMTR-MCNC: 138 MG/DL — HIGH (ref 70–99)
GLUCOSE BLDC GLUCOMTR-MCNC: 143 MG/DL — HIGH (ref 70–99)
GLUCOSE BLDC GLUCOMTR-MCNC: 145 MG/DL — HIGH (ref 70–99)
GLUCOSE BLDC GLUCOMTR-MCNC: 145 MG/DL — HIGH (ref 70–99)
GLUCOSE BLDC GLUCOMTR-MCNC: 147 MG/DL — HIGH (ref 70–99)
GLUCOSE BLDC GLUCOMTR-MCNC: 161 MG/DL — HIGH (ref 70–99)
GLUCOSE BLDC GLUCOMTR-MCNC: 171 MG/DL — HIGH (ref 70–99)
GLUCOSE BLDC GLUCOMTR-MCNC: 176 MG/DL — HIGH (ref 70–99)
GLUCOSE BLDC GLUCOMTR-MCNC: 182 MG/DL — HIGH (ref 70–99)
GLUCOSE BLDC GLUCOMTR-MCNC: 184 MG/DL — HIGH (ref 70–99)
GLUCOSE BLDC GLUCOMTR-MCNC: 222 MG/DL — HIGH (ref 70–99)
GLUCOSE BLDC GLUCOMTR-MCNC: 347 MG/DL — HIGH (ref 70–99)
GLUCOSE BLDC GLUCOMTR-MCNC: 464 MG/DL — CRITICAL HIGH (ref 70–99)
GLUCOSE BLDC GLUCOMTR-MCNC: 51 MG/DL — CRITICAL LOW (ref 70–99)
GLUCOSE BLDC GLUCOMTR-MCNC: 52 MG/DL — CRITICAL LOW (ref 70–99)
GLUCOSE BLDC GLUCOMTR-MCNC: 58 MG/DL — LOW (ref 70–99)
GLUCOSE BLDC GLUCOMTR-MCNC: 69 MG/DL — LOW (ref 70–99)
GLUCOSE BLDC GLUCOMTR-MCNC: 75 MG/DL — SIGNIFICANT CHANGE UP (ref 70–99)
GLUCOSE BLDC GLUCOMTR-MCNC: 77 MG/DL — SIGNIFICANT CHANGE UP (ref 70–99)
GLUCOSE BLDC GLUCOMTR-MCNC: 83 MG/DL — SIGNIFICANT CHANGE UP (ref 70–99)
GLUCOSE BLDC GLUCOMTR-MCNC: 84 MG/DL — SIGNIFICANT CHANGE UP (ref 70–99)
GLUCOSE BLDC GLUCOMTR-MCNC: 86 MG/DL — SIGNIFICANT CHANGE UP (ref 70–99)
GLUCOSE BLDC GLUCOMTR-MCNC: 86 MG/DL — SIGNIFICANT CHANGE UP (ref 70–99)
GLUCOSE BLDC GLUCOMTR-MCNC: 88 MG/DL — SIGNIFICANT CHANGE UP (ref 70–99)
GLUCOSE BLDC GLUCOMTR-MCNC: 88 MG/DL — SIGNIFICANT CHANGE UP (ref 70–99)
GLUCOSE BLDC GLUCOMTR-MCNC: 96 MG/DL — SIGNIFICANT CHANGE UP (ref 70–99)
GLUCOSE BLDC GLUCOMTR-MCNC: 97 MG/DL — SIGNIFICANT CHANGE UP (ref 70–99)
GLUCOSE SERPL-MCNC: 104 MG/DL — HIGH (ref 70–99)
GLUCOSE SERPL-MCNC: 132 MG/DL — HIGH (ref 70–99)
GLUCOSE SERPL-MCNC: 166 MG/DL — HIGH (ref 70–99)
GLUCOSE SERPL-MCNC: 183 MG/DL — HIGH (ref 70–99)
GLUCOSE SERPL-MCNC: 188 MG/DL — HIGH (ref 70–99)
GLUCOSE SERPL-MCNC: 385 MG/DL — HIGH (ref 70–99)
GLUCOSE SERPL-MCNC: 470 MG/DL — HIGH (ref 70–99)
GLUCOSE SERPL-MCNC: 476 MG/DL — HIGH (ref 70–99)
GLUCOSE SERPL-MCNC: 497 MG/DL — HIGH (ref 70–99)
GLUCOSE SERPL-MCNC: 69 MG/DL — LOW (ref 70–99)
GLUCOSE SERPL-MCNC: 78 MG/DL — SIGNIFICANT CHANGE UP (ref 70–99)
GLUCOSE UR QL: 1000 MG/DL
HCO3 BLDV-SCNC: 18 MMOL/L — LOW (ref 22–29)
HCT VFR BLD CALC: 27.9 % — LOW (ref 39–50)
HCT VFR BLD CALC: 28.3 % — LOW (ref 39–50)
HCT VFR BLD CALC: 28.8 % — LOW (ref 39–50)
HCT VFR BLD CALC: 28.9 % — LOW (ref 39–50)
HCT VFR BLD CALC: 32.2 % — LOW (ref 39–50)
HCT VFR BLD CALC: 36.2 % — LOW (ref 39–50)
HCT VFR BLD CALC: 37.9 % — LOW (ref 39–50)
HDLC SERPL-MCNC: 29 MG/DL — LOW
HGB BLD-MCNC: 10.9 G/DL — LOW (ref 13–17)
HGB BLD-MCNC: 11.9 G/DL — LOW (ref 13–17)
HGB BLD-MCNC: 12.8 G/DL — LOW (ref 13–17)
HGB BLD-MCNC: 9.1 G/DL — LOW (ref 13–17)
HGB BLD-MCNC: 9.7 G/DL — LOW (ref 13–17)
HGB BLD-MCNC: 9.8 G/DL — LOW (ref 13–17)
HGB BLD-MCNC: 9.8 G/DL — LOW (ref 13–17)
HPIV2 RNA SPEC QL NAA+PROBE: DETECTED
IMM GRANULOCYTES NFR BLD AUTO: 0.1 % — SIGNIFICANT CHANGE UP (ref 0–1.5)
IMM GRANULOCYTES NFR BLD AUTO: 0.4 % — SIGNIFICANT CHANGE UP (ref 0–1.5)
IMM GRANULOCYTES NFR BLD AUTO: 0.6 % — SIGNIFICANT CHANGE UP (ref 0–1.5)
INR BLD: 1.16 RATIO — SIGNIFICANT CHANGE UP (ref 0.88–1.16)
INR BLD: 1.18 RATIO — HIGH (ref 0.88–1.16)
IRON SATN MFR SERPL: 14 % — LOW (ref 16–55)
IRON SATN MFR SERPL: 33 UG/DL — LOW (ref 59–158)
KETONES UR-MCNC: ABNORMAL
LEUKOCYTE ESTERASE UR-ACNC: NEGATIVE — SIGNIFICANT CHANGE UP
LIDOCAIN IGE QN: 13 U/L — LOW (ref 22–51)
LIPID PNL WITH DIRECT LDL SERPL: 81 MG/DL — SIGNIFICANT CHANGE UP
LYMPHOCYTES # BLD AUTO: 1.41 K/UL — SIGNIFICANT CHANGE UP (ref 1–3.3)
LYMPHOCYTES # BLD AUTO: 1.89 K/UL — SIGNIFICANT CHANGE UP (ref 1–3.3)
LYMPHOCYTES # BLD AUTO: 18.4 % — SIGNIFICANT CHANGE UP (ref 13–44)
LYMPHOCYTES # BLD AUTO: 18.7 % — SIGNIFICANT CHANGE UP (ref 13–44)
LYMPHOCYTES # BLD AUTO: 19.1 % — SIGNIFICANT CHANGE UP (ref 13–44)
LYMPHOCYTES # BLD AUTO: 2.13 K/UL — SIGNIFICANT CHANGE UP (ref 1–3.3)
LYMPHOCYTES # BLD AUTO: 2.42 K/UL — SIGNIFICANT CHANGE UP (ref 1–3.3)
LYMPHOCYTES # BLD AUTO: 2.64 K/UL — SIGNIFICANT CHANGE UP (ref 1–3.3)
LYMPHOCYTES # BLD AUTO: 29.3 % — SIGNIFICANT CHANGE UP (ref 13–44)
LYMPHOCYTES # BLD AUTO: 34.5 % — SIGNIFICANT CHANGE UP (ref 13–44)
MAGNESIUM SERPL-MCNC: 1.6 MG/DL — SIGNIFICANT CHANGE UP (ref 1.6–2.6)
MAGNESIUM SERPL-MCNC: 1.9 MG/DL — SIGNIFICANT CHANGE UP (ref 1.6–2.6)
MAGNESIUM SERPL-MCNC: 1.9 MG/DL — SIGNIFICANT CHANGE UP (ref 1.8–2.6)
MAGNESIUM SERPL-MCNC: 2 MG/DL — SIGNIFICANT CHANGE UP (ref 1.6–2.6)
MCHC RBC-ENTMCNC: 28.1 PG — SIGNIFICANT CHANGE UP (ref 27–34)
MCHC RBC-ENTMCNC: 28.1 PG — SIGNIFICANT CHANGE UP (ref 27–34)
MCHC RBC-ENTMCNC: 28.2 PG — SIGNIFICANT CHANGE UP (ref 27–34)
MCHC RBC-ENTMCNC: 28.4 PG — SIGNIFICANT CHANGE UP (ref 27–34)
MCHC RBC-ENTMCNC: 28.4 PG — SIGNIFICANT CHANGE UP (ref 27–34)
MCHC RBC-ENTMCNC: 28.5 PG — SIGNIFICANT CHANGE UP (ref 27–34)
MCHC RBC-ENTMCNC: 28.6 PG — SIGNIFICANT CHANGE UP (ref 27–34)
MCHC RBC-ENTMCNC: 32.6 GM/DL — SIGNIFICANT CHANGE UP (ref 32–36)
MCHC RBC-ENTMCNC: 32.9 GM/DL — SIGNIFICANT CHANGE UP (ref 32–36)
MCHC RBC-ENTMCNC: 33.8 GM/DL — SIGNIFICANT CHANGE UP (ref 32–36)
MCHC RBC-ENTMCNC: 33.9 GM/DL — SIGNIFICANT CHANGE UP (ref 32–36)
MCHC RBC-ENTMCNC: 33.9 GM/DL — SIGNIFICANT CHANGE UP (ref 32–36)
MCHC RBC-ENTMCNC: 34 GM/DL — SIGNIFICANT CHANGE UP (ref 32–36)
MCHC RBC-ENTMCNC: 34.3 GM/DL — SIGNIFICANT CHANGE UP (ref 32–36)
MCV RBC AUTO: 82.5 FL — SIGNIFICANT CHANGE UP (ref 80–100)
MCV RBC AUTO: 83 FL — SIGNIFICANT CHANGE UP (ref 80–100)
MCV RBC AUTO: 83.5 FL — SIGNIFICANT CHANGE UP (ref 80–100)
MCV RBC AUTO: 84.2 FL — SIGNIFICANT CHANGE UP (ref 80–100)
MCV RBC AUTO: 84.3 FL — SIGNIFICANT CHANGE UP (ref 80–100)
MCV RBC AUTO: 86.1 FL — SIGNIFICANT CHANGE UP (ref 80–100)
MCV RBC AUTO: 86.4 FL — SIGNIFICANT CHANGE UP (ref 80–100)
MONOCYTES # BLD AUTO: 0.33 K/UL — SIGNIFICANT CHANGE UP (ref 0–0.9)
MONOCYTES # BLD AUTO: 0.58 K/UL — SIGNIFICANT CHANGE UP (ref 0–0.9)
MONOCYTES # BLD AUTO: 0.63 K/UL — SIGNIFICANT CHANGE UP (ref 0–0.9)
MONOCYTES # BLD AUTO: 0.73 K/UL — SIGNIFICANT CHANGE UP (ref 0–0.9)
MONOCYTES # BLD AUTO: 0.86 K/UL — SIGNIFICANT CHANGE UP (ref 0–0.9)
MONOCYTES NFR BLD AUTO: 11.2 % — SIGNIFICANT CHANGE UP (ref 2–14)
MONOCYTES NFR BLD AUTO: 3.3 % — SIGNIFICANT CHANGE UP (ref 2–14)
MONOCYTES NFR BLD AUTO: 5 % — SIGNIFICANT CHANGE UP (ref 2–14)
MONOCYTES NFR BLD AUTO: 8 % — SIGNIFICANT CHANGE UP (ref 2–14)
MONOCYTES NFR BLD AUTO: 9.5 % — SIGNIFICANT CHANGE UP (ref 2–14)
NEUTROPHILS # BLD AUTO: 3.85 K/UL — SIGNIFICANT CHANGE UP (ref 1.8–7.4)
NEUTROPHILS # BLD AUTO: 4.4 K/UL — SIGNIFICANT CHANGE UP (ref 1.8–7.4)
NEUTROPHILS # BLD AUTO: 5.32 K/UL — SIGNIFICANT CHANGE UP (ref 1.8–7.4)
NEUTROPHILS # BLD AUTO: 7.85 K/UL — HIGH (ref 1.8–7.4)
NEUTROPHILS # BLD AUTO: 9.48 K/UL — HIGH (ref 1.8–7.4)
NEUTROPHILS NFR BLD AUTO: 50.3 % — SIGNIFICANT CHANGE UP (ref 43–77)
NEUTROPHILS NFR BLD AUTO: 60.4 % — SIGNIFICANT CHANGE UP (ref 43–77)
NEUTROPHILS NFR BLD AUTO: 69.6 % — SIGNIFICANT CHANGE UP (ref 43–77)
NEUTROPHILS NFR BLD AUTO: 74.9 % — SIGNIFICANT CHANGE UP (ref 43–77)
NEUTROPHILS NFR BLD AUTO: 77.4 % — HIGH (ref 43–77)
NITRITE UR-MCNC: NEGATIVE — SIGNIFICANT CHANGE UP
NON HDL CHOLESTEROL: 96 MG/DL — SIGNIFICANT CHANGE UP
PCO2 BLDV: 30 MMHG — LOW (ref 42–55)
PH BLDV: 7.39 — SIGNIFICANT CHANGE UP (ref 7.32–7.43)
PH UR: 5 — SIGNIFICANT CHANGE UP (ref 5–8)
PHOSPHATE SERPL-MCNC: 2.4 MG/DL — SIGNIFICANT CHANGE UP (ref 2.4–4.7)
PHOSPHATE SERPL-MCNC: 3 MG/DL — SIGNIFICANT CHANGE UP (ref 2.4–4.7)
PHOSPHATE SERPL-MCNC: 3.1 MG/DL — SIGNIFICANT CHANGE UP (ref 2.4–4.7)
PHOSPHATE SERPL-MCNC: 3.1 MG/DL — SIGNIFICANT CHANGE UP (ref 2.4–4.7)
PLATELET # BLD AUTO: 118 K/UL — LOW (ref 150–400)
PLATELET # BLD AUTO: 120 K/UL — LOW (ref 150–400)
PLATELET # BLD AUTO: 124 K/UL — LOW (ref 150–400)
PLATELET # BLD AUTO: 156 K/UL — SIGNIFICANT CHANGE UP (ref 150–400)
PLATELET # BLD AUTO: 166 K/UL — SIGNIFICANT CHANGE UP (ref 150–400)
PLATELET # BLD AUTO: 178 K/UL — SIGNIFICANT CHANGE UP (ref 150–400)
PLATELET # BLD AUTO: 179 K/UL — SIGNIFICANT CHANGE UP (ref 150–400)
PO2 BLDV: 132 MMHG — HIGH (ref 25–45)
POTASSIUM SERPL-MCNC: 3.3 MMOL/L — LOW (ref 3.5–5.3)
POTASSIUM SERPL-MCNC: 3.4 MMOL/L — LOW (ref 3.5–5.3)
POTASSIUM SERPL-MCNC: 3.6 MMOL/L — SIGNIFICANT CHANGE UP (ref 3.5–5.3)
POTASSIUM SERPL-MCNC: 3.7 MMOL/L — SIGNIFICANT CHANGE UP (ref 3.5–5.3)
POTASSIUM SERPL-MCNC: 3.7 MMOL/L — SIGNIFICANT CHANGE UP (ref 3.5–5.3)
POTASSIUM SERPL-MCNC: 3.8 MMOL/L — SIGNIFICANT CHANGE UP (ref 3.5–5.3)
POTASSIUM SERPL-MCNC: 3.9 MMOL/L — SIGNIFICANT CHANGE UP (ref 3.5–5.3)
POTASSIUM SERPL-MCNC: 4.1 MMOL/L — SIGNIFICANT CHANGE UP (ref 3.5–5.3)
POTASSIUM SERPL-MCNC: 4.2 MMOL/L — SIGNIFICANT CHANGE UP (ref 3.5–5.3)
POTASSIUM SERPL-MCNC: 4.3 MMOL/L — SIGNIFICANT CHANGE UP (ref 3.5–5.3)
POTASSIUM SERPL-MCNC: 5.1 MMOL/L — SIGNIFICANT CHANGE UP (ref 3.5–5.3)
POTASSIUM SERPL-SCNC: 3.3 MMOL/L — LOW (ref 3.5–5.3)
POTASSIUM SERPL-SCNC: 3.4 MMOL/L — LOW (ref 3.5–5.3)
POTASSIUM SERPL-SCNC: 3.6 MMOL/L — SIGNIFICANT CHANGE UP (ref 3.5–5.3)
POTASSIUM SERPL-SCNC: 3.7 MMOL/L — SIGNIFICANT CHANGE UP (ref 3.5–5.3)
POTASSIUM SERPL-SCNC: 3.7 MMOL/L — SIGNIFICANT CHANGE UP (ref 3.5–5.3)
POTASSIUM SERPL-SCNC: 3.8 MMOL/L — SIGNIFICANT CHANGE UP (ref 3.5–5.3)
POTASSIUM SERPL-SCNC: 3.9 MMOL/L — SIGNIFICANT CHANGE UP (ref 3.5–5.3)
POTASSIUM SERPL-SCNC: 4.1 MMOL/L — SIGNIFICANT CHANGE UP (ref 3.5–5.3)
POTASSIUM SERPL-SCNC: 4.2 MMOL/L — SIGNIFICANT CHANGE UP (ref 3.5–5.3)
POTASSIUM SERPL-SCNC: 4.3 MMOL/L — SIGNIFICANT CHANGE UP (ref 3.5–5.3)
POTASSIUM SERPL-SCNC: 5.1 MMOL/L — SIGNIFICANT CHANGE UP (ref 3.5–5.3)
PROCALCITONIN SERPL-MCNC: 0.52 NG/ML — HIGH (ref 0.02–0.1)
PROT SERPL-MCNC: 5.3 G/DL — LOW (ref 6.6–8.7)
PROT SERPL-MCNC: 5.4 G/DL — LOW (ref 6.6–8.7)
PROT SERPL-MCNC: 5.6 G/DL — LOW (ref 6.6–8.7)
PROT SERPL-MCNC: 7 G/DL — SIGNIFICANT CHANGE UP (ref 6.6–8.7)
PROT UR-MCNC: 300 MG/DL — SIGNIFICANT CHANGE UP
PROTHROM AB SERPL-ACNC: 13.4 SEC — SIGNIFICANT CHANGE UP (ref 10.6–13.6)
PROTHROM AB SERPL-ACNC: 13.6 SEC — SIGNIFICANT CHANGE UP (ref 10.6–13.6)
RAPID RVP RESULT: DETECTED
RBC # BLD: 3.24 M/UL — LOW (ref 4.2–5.8)
RBC # BLD: 3.39 M/UL — LOW (ref 4.2–5.8)
RBC # BLD: 3.48 M/UL — LOW (ref 4.2–5.8)
RBC # BLD: 3.49 M/UL — LOW (ref 4.2–5.8)
RBC # BLD: 3.6 M/UL — LOW (ref 4.2–5.8)
RBC # BLD: 3.82 M/UL — LOW (ref 4.2–5.8)
RBC # BLD: 4.19 M/UL — LOW (ref 4.2–5.8)
RBC # BLD: 4.5 M/UL — SIGNIFICANT CHANGE UP (ref 4.2–5.8)
RBC # FLD: 12.7 % — SIGNIFICANT CHANGE UP (ref 10.3–14.5)
RBC # FLD: 13.1 % — SIGNIFICANT CHANGE UP (ref 10.3–14.5)
RBC # FLD: 13.2 % — SIGNIFICANT CHANGE UP (ref 10.3–14.5)
RBC # FLD: 13.7 % — SIGNIFICANT CHANGE UP (ref 10.3–14.5)
RBC # FLD: 14.3 % — SIGNIFICANT CHANGE UP (ref 10.3–14.5)
RBC CASTS # UR COMP ASSIST: ABNORMAL /HPF (ref 0–4)
RETICS #: 66.6 K/UL — SIGNIFICANT CHANGE UP (ref 25–125)
RETICS/RBC NFR: 1.9 % — SIGNIFICANT CHANGE UP (ref 0.5–2.5)
SAO2 % BLDV: 99.5 % — SIGNIFICANT CHANGE UP
SARS-COV-2 N GENE NPH QL NAA+PROBE: NOT DETECTED
SARS-COV-2 N GENE NPH QL NAA+PROBE: NOT DETECTED
SARS-COV-2 RNA SPEC QL NAA+PROBE: DETECTED
SARS-COV-2 RNA SPEC QL NAA+PROBE: SIGNIFICANT CHANGE UP
SODIUM SERPL-SCNC: 131 MMOL/L — LOW (ref 135–145)
SODIUM SERPL-SCNC: 133 MMOL/L — LOW (ref 135–145)
SODIUM SERPL-SCNC: 134 MMOL/L — LOW (ref 135–145)
SODIUM SERPL-SCNC: 135 MMOL/L — SIGNIFICANT CHANGE UP (ref 135–145)
SODIUM SERPL-SCNC: 138 MMOL/L — SIGNIFICANT CHANGE UP (ref 135–145)
SODIUM SERPL-SCNC: 139 MMOL/L — SIGNIFICANT CHANGE UP (ref 135–145)
SODIUM SERPL-SCNC: 140 MMOL/L — SIGNIFICANT CHANGE UP (ref 135–145)
SODIUM SERPL-SCNC: 141 MMOL/L — SIGNIFICANT CHANGE UP (ref 135–145)
SODIUM SERPL-SCNC: 142 MMOL/L — SIGNIFICANT CHANGE UP (ref 135–145)
SP GR SPEC: 1.01 — SIGNIFICANT CHANGE UP (ref 1.01–1.02)
SPECIMEN SOURCE: SIGNIFICANT CHANGE UP
TIBC SERPL-MCNC: 229 UG/DL — SIGNIFICANT CHANGE UP (ref 220–430)
TRANSFERRIN SERPL-MCNC: 160 MG/DL — LOW (ref 180–329)
TRIGL SERPL-MCNC: 74 MG/DL — SIGNIFICANT CHANGE UP
TROPONIN T SERPL-MCNC: 0.03 NG/ML — SIGNIFICANT CHANGE UP (ref 0–0.06)
TSH SERPL-MCNC: 1.32 UIU/ML — SIGNIFICANT CHANGE UP (ref 0.27–4.2)
UROBILINOGEN FLD QL: NEGATIVE MG/DL — SIGNIFICANT CHANGE UP
WBC # BLD: 10.13 K/UL — SIGNIFICANT CHANGE UP (ref 3.8–10.5)
WBC # BLD: 12.09 K/UL — HIGH (ref 3.8–10.5)
WBC # BLD: 12.66 K/UL — HIGH (ref 3.8–10.5)
WBC # BLD: 7.28 K/UL — SIGNIFICANT CHANGE UP (ref 3.8–10.5)
WBC # BLD: 7.65 K/UL — SIGNIFICANT CHANGE UP (ref 3.8–10.5)
WBC # BLD: 7.66 K/UL — SIGNIFICANT CHANGE UP (ref 3.8–10.5)
WBC # BLD: 8.5 K/UL — SIGNIFICANT CHANGE UP (ref 3.8–10.5)
WBC # FLD AUTO: 10.13 K/UL — SIGNIFICANT CHANGE UP (ref 3.8–10.5)
WBC # FLD AUTO: 12.09 K/UL — HIGH (ref 3.8–10.5)
WBC # FLD AUTO: 12.66 K/UL — HIGH (ref 3.8–10.5)
WBC # FLD AUTO: 7.28 K/UL — SIGNIFICANT CHANGE UP (ref 3.8–10.5)
WBC # FLD AUTO: 7.65 K/UL — SIGNIFICANT CHANGE UP (ref 3.8–10.5)
WBC # FLD AUTO: 7.66 K/UL — SIGNIFICANT CHANGE UP (ref 3.8–10.5)
WBC # FLD AUTO: 8.5 K/UL — SIGNIFICANT CHANGE UP (ref 3.8–10.5)
WBC UR QL: SIGNIFICANT CHANGE UP

## 2021-01-01 PROCEDURE — G1004: CPT

## 2021-01-01 PROCEDURE — 0225U NFCT DS DNA&RNA 21 SARSCOV2: CPT

## 2021-01-01 PROCEDURE — C1764: CPT

## 2021-01-01 PROCEDURE — 82947 ASSAY GLUCOSE BLOOD QUANT: CPT

## 2021-01-01 PROCEDURE — 82009 KETONE BODYS QUAL: CPT

## 2021-01-01 PROCEDURE — 99215 OFFICE O/P EST HI 40 MIN: CPT

## 2021-01-01 PROCEDURE — 99072 ADDL SUPL MATRL&STAF TM PHE: CPT

## 2021-01-01 PROCEDURE — 83036 HEMOGLOBIN GLYCOSYLATED A1C: CPT

## 2021-01-01 PROCEDURE — 99284 EMERGENCY DEPT VISIT MOD MDM: CPT | Mod: 25

## 2021-01-01 PROCEDURE — 86140 C-REACTIVE PROTEIN: CPT

## 2021-01-01 PROCEDURE — 71045 X-RAY EXAM CHEST 1 VIEW: CPT | Mod: 26

## 2021-01-01 PROCEDURE — 93040 RHYTHM ECG WITH REPORT: CPT

## 2021-01-01 PROCEDURE — 99232 SBSQ HOSP IP/OBS MODERATE 35: CPT

## 2021-01-01 PROCEDURE — 85652 RBC SED RATE AUTOMATED: CPT

## 2021-01-01 PROCEDURE — 93005 ELECTROCARDIOGRAM TRACING: CPT

## 2021-01-01 PROCEDURE — 99222 1ST HOSP IP/OBS MODERATE 55: CPT

## 2021-01-01 PROCEDURE — 81001 URINALYSIS AUTO W/SCOPE: CPT

## 2021-01-01 PROCEDURE — 82803 BLOOD GASES ANY COMBINATION: CPT

## 2021-01-01 PROCEDURE — 99239 HOSP IP/OBS DSCHRG MGMT >30: CPT

## 2021-01-01 PROCEDURE — 80048 BASIC METABOLIC PNL TOTAL CA: CPT

## 2021-01-01 PROCEDURE — 83690 ASSAY OF LIPASE: CPT

## 2021-01-01 PROCEDURE — 85610 PROTHROMBIN TIME: CPT

## 2021-01-01 PROCEDURE — 84484 ASSAY OF TROPONIN QUANT: CPT

## 2021-01-01 PROCEDURE — 93325 DOPPLER ECHO COLOR FLOW MAPG: CPT

## 2021-01-01 PROCEDURE — 99233 SBSQ HOSP IP/OBS HIGH 50: CPT

## 2021-01-01 PROCEDURE — 82330 ASSAY OF CALCIUM: CPT

## 2021-01-01 PROCEDURE — 85014 HEMATOCRIT: CPT

## 2021-01-01 PROCEDURE — 85025 COMPLETE CBC W/AUTO DIFF WBC: CPT

## 2021-01-01 PROCEDURE — 99214 OFFICE O/P EST MOD 30 MIN: CPT

## 2021-01-01 PROCEDURE — 95816 EEG AWAKE AND DROWSY: CPT

## 2021-01-01 PROCEDURE — 93971 EXTREMITY STUDY: CPT

## 2021-01-01 PROCEDURE — 83735 ASSAY OF MAGNESIUM: CPT

## 2021-01-01 PROCEDURE — 87045 FECES CULTURE AEROBIC BACT: CPT

## 2021-01-01 PROCEDURE — 87040 BLOOD CULTURE FOR BACTERIA: CPT

## 2021-01-01 PROCEDURE — 96374 THER/PROPH/DIAG INJ IV PUSH: CPT

## 2021-01-01 PROCEDURE — 87046 STOOL CULTR AEROBIC BACT EA: CPT

## 2021-01-01 PROCEDURE — 99223 1ST HOSP IP/OBS HIGH 75: CPT

## 2021-01-01 PROCEDURE — 93010 ELECTROCARDIOGRAM REPORT: CPT

## 2021-01-01 PROCEDURE — 82962 GLUCOSE BLOOD TEST: CPT

## 2021-01-01 PROCEDURE — 84145 PROCALCITONIN (PCT): CPT

## 2021-01-01 PROCEDURE — 85730 THROMBOPLASTIN TIME PARTIAL: CPT

## 2021-01-01 PROCEDURE — 80053 COMPREHEN METABOLIC PANEL: CPT

## 2021-01-01 PROCEDURE — 36415 COLL VENOUS BLD VENIPUNCTURE: CPT

## 2021-01-01 PROCEDURE — 99223 1ST HOSP IP/OBS HIGH 75: CPT | Mod: GC

## 2021-01-01 PROCEDURE — 95720 EEG PHY/QHP EA INCR W/VEEG: CPT

## 2021-01-01 PROCEDURE — 70450 CT HEAD/BRAIN W/O DYE: CPT | Mod: 26,MG

## 2021-01-01 PROCEDURE — 93312 ECHO TRANSESOPHAGEAL: CPT

## 2021-01-01 PROCEDURE — 85018 HEMOGLOBIN: CPT

## 2021-01-01 PROCEDURE — 95721 EEG PHY/QHP>36<60 HR W/O VID: CPT

## 2021-01-01 PROCEDURE — 80061 LIPID PANEL: CPT

## 2021-01-01 PROCEDURE — 99284 EMERGENCY DEPT VISIT MOD MDM: CPT

## 2021-01-01 PROCEDURE — 84132 ASSAY OF SERUM POTASSIUM: CPT

## 2021-01-01 PROCEDURE — 33285 INSJ SUBQ CAR RHYTHM MNTR: CPT

## 2021-01-01 PROCEDURE — 74177 CT ABD & PELVIS W/CONTRAST: CPT | Mod: MG

## 2021-01-01 PROCEDURE — 82435 ASSAY OF BLOOD CHLORIDE: CPT

## 2021-01-01 PROCEDURE — 73630 X-RAY EXAM OF FOOT: CPT | Mod: 26,LT

## 2021-01-01 PROCEDURE — 99285 EMERGENCY DEPT VISIT HI MDM: CPT

## 2021-01-01 PROCEDURE — 73630 X-RAY EXAM OF FOOT: CPT

## 2021-01-01 PROCEDURE — 95708 EEG WO VID EA 12-26HR UNMNTR: CPT

## 2021-01-01 PROCEDURE — 99291 CRITICAL CARE FIRST HOUR: CPT

## 2021-01-01 PROCEDURE — 87507 IADNA-DNA/RNA PROBE TQ 12-25: CPT

## 2021-01-01 PROCEDURE — 84100 ASSAY OF PHOSPHORUS: CPT

## 2021-01-01 PROCEDURE — 84443 ASSAY THYROID STIM HORMONE: CPT

## 2021-01-01 PROCEDURE — 99285 EMERGENCY DEPT VISIT HI MDM: CPT | Mod: 25

## 2021-01-01 PROCEDURE — 85027 COMPLETE CBC AUTOMATED: CPT

## 2021-01-01 PROCEDURE — 93971 EXTREMITY STUDY: CPT | Mod: 26,LT

## 2021-01-01 PROCEDURE — 84295 ASSAY OF SERUM SODIUM: CPT

## 2021-01-01 PROCEDURE — 71045 X-RAY EXAM CHEST 1 VIEW: CPT

## 2021-01-01 PROCEDURE — 95700 EEG CONT REC W/VID EEG TECH: CPT

## 2021-01-01 PROCEDURE — 74177 CT ABD & PELVIS W/CONTRAST: CPT | Mod: 26,MG

## 2021-01-01 PROCEDURE — 93320 DOPPLER ECHO COMPLETE: CPT

## 2021-01-01 PROCEDURE — 83605 ASSAY OF LACTIC ACID: CPT

## 2021-01-01 RX ORDER — PIPERACILLIN AND TAZOBACTAM 4; .5 G/20ML; G/20ML
3.38 INJECTION, POWDER, LYOPHILIZED, FOR SOLUTION INTRAVENOUS ONCE
Refills: 0 | Status: COMPLETED | OUTPATIENT
Start: 2021-01-01 | End: 2021-01-01

## 2021-01-01 RX ORDER — DEXTROSE 50 % IN WATER 50 %
15 SYRINGE (ML) INTRAVENOUS ONCE
Refills: 0 | Status: DISCONTINUED | OUTPATIENT
Start: 2021-01-01 | End: 2021-01-01

## 2021-01-01 RX ORDER — DEXTROSE 50 % IN WATER 50 %
25 SYRINGE (ML) INTRAVENOUS ONCE
Refills: 0 | Status: DISCONTINUED | OUTPATIENT
Start: 2021-01-01 | End: 2021-01-01

## 2021-01-01 RX ORDER — FUROSEMIDE 40 MG
1 TABLET ORAL
Qty: 0 | Refills: 0 | DISCHARGE

## 2021-01-01 RX ORDER — FUROSEMIDE 40 MG
40 TABLET ORAL
Refills: 0 | Status: DISCONTINUED | OUTPATIENT
Start: 2021-01-01 | End: 2022-01-01

## 2021-01-01 RX ORDER — SACUBITRIL AND VALSARTAN 24; 26 MG/1; MG/1
1 TABLET, FILM COATED ORAL
Qty: 0 | Refills: 0 | DISCHARGE

## 2021-01-01 RX ORDER — GABAPENTIN 400 MG/1
1 CAPSULE ORAL
Qty: 0 | Refills: 0 | DISCHARGE

## 2021-01-01 RX ORDER — HEPARIN SODIUM 5000 [USP'U]/ML
5000 INJECTION INTRAVENOUS; SUBCUTANEOUS EVERY 12 HOURS
Refills: 0 | Status: DISCONTINUED | OUTPATIENT
Start: 2021-01-01 | End: 2021-01-01

## 2021-01-01 RX ORDER — SACUBITRIL AND VALSARTAN 24; 26 MG/1; MG/1
1 TABLET, FILM COATED ORAL
Refills: 0 | Status: DISCONTINUED | OUTPATIENT
Start: 2021-01-01 | End: 2022-01-01

## 2021-01-01 RX ORDER — SODIUM CHLORIDE 9 MG/ML
1000 INJECTION INTRAMUSCULAR; INTRAVENOUS; SUBCUTANEOUS ONCE
Refills: 0 | Status: COMPLETED | OUTPATIENT
Start: 2021-01-01 | End: 2021-01-01

## 2021-01-01 RX ORDER — ASPIRIN/CALCIUM CARB/MAGNESIUM 324 MG
81 TABLET ORAL DAILY
Refills: 0 | Status: DISCONTINUED | OUTPATIENT
Start: 2021-01-01 | End: 2022-01-01

## 2021-01-01 RX ORDER — GABAPENTIN 400 MG/1
100 CAPSULE ORAL THREE TIMES A DAY
Refills: 0 | Status: DISCONTINUED | OUTPATIENT
Start: 2021-01-01 | End: 2022-01-01

## 2021-01-01 RX ORDER — CEFAZOLIN SODIUM 1 G
2000 VIAL (EA) INJECTION ONCE
Refills: 0 | Status: COMPLETED | OUTPATIENT
Start: 2021-01-01 | End: 2021-01-01

## 2021-01-01 RX ORDER — FUROSEMIDE 40 MG
40 TABLET ORAL
Refills: 0 | Status: DISCONTINUED | OUTPATIENT
Start: 2021-01-01 | End: 2021-01-01

## 2021-01-01 RX ORDER — ONDANSETRON 8 MG/1
4 TABLET, FILM COATED ORAL ONCE
Refills: 0 | Status: COMPLETED | OUTPATIENT
Start: 2021-01-01 | End: 2021-01-01

## 2021-01-01 RX ORDER — TICAGRELOR 90 MG/1
90 TABLET ORAL EVERY 12 HOURS
Refills: 0 | Status: DISCONTINUED | OUTPATIENT
Start: 2021-01-01 | End: 2021-01-01

## 2021-01-01 RX ORDER — SODIUM CHLORIDE 9 MG/ML
3 INJECTION INTRAMUSCULAR; INTRAVENOUS; SUBCUTANEOUS EVERY 8 HOURS
Refills: 0 | Status: DISCONTINUED | OUTPATIENT
Start: 2021-01-01 | End: 2021-01-01

## 2021-01-01 RX ORDER — ATORVASTATIN CALCIUM 80 MG/1
1 TABLET, FILM COATED ORAL
Qty: 0 | Refills: 0 | DISCHARGE

## 2021-01-01 RX ORDER — GABAPENTIN 400 MG/1
100 CAPSULE ORAL THREE TIMES A DAY
Refills: 0 | Status: DISCONTINUED | OUTPATIENT
Start: 2021-01-01 | End: 2021-01-01

## 2021-01-01 RX ORDER — CIPROFLOXACIN LACTATE 400MG/40ML
1 VIAL (ML) INTRAVENOUS
Qty: 6 | Refills: 0
Start: 2021-01-01 | End: 2021-01-01

## 2021-01-01 RX ORDER — CEPHALEXIN 500 MG
1 CAPSULE ORAL
Qty: 20 | Refills: 0
Start: 2021-01-01 | End: 2021-01-01

## 2021-01-01 RX ORDER — SODIUM CHLORIDE 9 MG/ML
1000 INJECTION, SOLUTION INTRAVENOUS
Refills: 0 | Status: DISCONTINUED | OUTPATIENT
Start: 2021-01-01 | End: 2021-01-01

## 2021-01-01 RX ORDER — SODIUM CHLORIDE 9 MG/ML
1000 INJECTION, SOLUTION INTRAVENOUS
Refills: 0 | Status: DISCONTINUED | OUTPATIENT
Start: 2021-01-01 | End: 2022-01-01

## 2021-01-01 RX ORDER — SPIRONOLACTONE 25 MG/1
1 TABLET, FILM COATED ORAL
Qty: 0 | Refills: 0 | DISCHARGE

## 2021-01-01 RX ORDER — DEXTROSE 50 % IN WATER 50 %
12.5 SYRINGE (ML) INTRAVENOUS ONCE
Refills: 0 | Status: DISCONTINUED | OUTPATIENT
Start: 2021-01-01 | End: 2022-01-01

## 2021-01-01 RX ORDER — METOCLOPRAMIDE HCL 10 MG
10 TABLET ORAL ONCE
Refills: 0 | Status: COMPLETED | OUTPATIENT
Start: 2021-01-01 | End: 2021-01-01

## 2021-01-01 RX ORDER — MAGNESIUM SULFATE 500 MG/ML
1 VIAL (ML) INJECTION ONCE
Refills: 0 | Status: COMPLETED | OUTPATIENT
Start: 2021-01-01 | End: 2021-01-01

## 2021-01-01 RX ORDER — HYDRALAZINE HCL 50 MG
10 TABLET ORAL ONCE
Refills: 0 | Status: COMPLETED | OUTPATIENT
Start: 2021-01-01 | End: 2021-01-01

## 2021-01-01 RX ORDER — FERROUS SULFATE 325(65) MG
325 TABLET ORAL DAILY
Refills: 0 | Status: DISCONTINUED | OUTPATIENT
Start: 2021-01-01 | End: 2022-01-01

## 2021-01-01 RX ORDER — TICAGRELOR 90 MG/1
1 TABLET ORAL
Qty: 0 | Refills: 0 | DISCHARGE

## 2021-01-01 RX ORDER — SODIUM CHLORIDE 9 MG/ML
2000 INJECTION INTRAMUSCULAR; INTRAVENOUS; SUBCUTANEOUS ONCE
Refills: 0 | Status: COMPLETED | OUTPATIENT
Start: 2021-01-01 | End: 2021-01-01

## 2021-01-01 RX ORDER — PIPERACILLIN AND TAZOBACTAM 4; .5 G/20ML; G/20ML
3.38 INJECTION, POWDER, LYOPHILIZED, FOR SOLUTION INTRAVENOUS EVERY 8 HOURS
Refills: 0 | Status: DISCONTINUED | OUTPATIENT
Start: 2021-01-01 | End: 2021-01-01

## 2021-01-01 RX ORDER — METRONIDAZOLE 500 MG
1 TABLET ORAL
Qty: 9 | Refills: 0
Start: 2021-01-01 | End: 2021-01-01

## 2021-01-01 RX ORDER — TICAGRELOR 90 MG/1
90 TABLET ORAL DAILY
Refills: 0 | Status: DISCONTINUED | OUTPATIENT
Start: 2021-01-01 | End: 2022-01-01

## 2021-01-01 RX ORDER — INSULIN HUMAN 100 [IU]/ML
10 INJECTION, SOLUTION SUBCUTANEOUS ONCE
Refills: 0 | Status: COMPLETED | OUTPATIENT
Start: 2021-01-01 | End: 2021-01-01

## 2021-01-01 RX ORDER — GLUCAGON INJECTION, SOLUTION 0.5 MG/.1ML
1 INJECTION, SOLUTION SUBCUTANEOUS ONCE
Refills: 0 | Status: DISCONTINUED | OUTPATIENT
Start: 2021-01-01 | End: 2021-01-01

## 2021-01-01 RX ORDER — INSULIN GLARGINE 100 [IU]/ML
20 INJECTION, SOLUTION SUBCUTANEOUS
Qty: 0 | Refills: 0 | DISCHARGE

## 2021-01-01 RX ORDER — INSULIN GLARGINE 100 [IU]/ML
20 INJECTION, SOLUTION SUBCUTANEOUS EVERY MORNING
Refills: 0 | Status: DISCONTINUED | OUTPATIENT
Start: 2021-01-01 | End: 2021-01-01

## 2021-01-01 RX ORDER — LEVETIRACETAM 250 MG/1
1 TABLET, FILM COATED ORAL
Qty: 0 | Refills: 0 | DISCHARGE

## 2021-01-01 RX ORDER — AZITHROMYCIN 500 MG/1
500 TABLET, FILM COATED ORAL ONCE
Refills: 0 | Status: DISCONTINUED | OUTPATIENT
Start: 2021-01-01 | End: 2021-01-01

## 2021-01-01 RX ORDER — POTASSIUM CHLORIDE 20 MEQ
10 PACKET (EA) ORAL
Refills: 0 | Status: COMPLETED | OUTPATIENT
Start: 2021-01-01 | End: 2021-01-01

## 2021-01-01 RX ORDER — POTASSIUM CHLORIDE 20 MEQ
40 PACKET (EA) ORAL ONCE
Refills: 0 | Status: COMPLETED | OUTPATIENT
Start: 2021-01-01 | End: 2021-01-01

## 2021-01-01 RX ORDER — ENOXAPARIN SODIUM 100 MG/ML
40 INJECTION SUBCUTANEOUS DAILY
Refills: 0 | Status: DISCONTINUED | OUTPATIENT
Start: 2021-01-01 | End: 2022-01-01

## 2021-01-01 RX ORDER — CARVEDILOL PHOSPHATE 80 MG/1
3.12 CAPSULE, EXTENDED RELEASE ORAL EVERY 12 HOURS
Refills: 0 | Status: DISCONTINUED | OUTPATIENT
Start: 2021-01-01 | End: 2021-01-01

## 2021-01-01 RX ORDER — DEXTROSE 50 % IN WATER 50 %
25 SYRINGE (ML) INTRAVENOUS ONCE
Refills: 0 | Status: DISCONTINUED | OUTPATIENT
Start: 2021-01-01 | End: 2022-01-01

## 2021-01-01 RX ORDER — LEVETIRACETAM 250 MG/1
750 TABLET, FILM COATED ORAL
Refills: 0 | Status: DISCONTINUED | OUTPATIENT
Start: 2021-01-01 | End: 2022-01-01

## 2021-01-01 RX ORDER — ONDANSETRON 8 MG/1
4 TABLET, FILM COATED ORAL EVERY 6 HOURS
Refills: 0 | Status: DISCONTINUED | OUTPATIENT
Start: 2021-01-01 | End: 2021-01-01

## 2021-01-01 RX ORDER — ATORVASTATIN CALCIUM 80 MG/1
40 TABLET, FILM COATED ORAL DAILY
Refills: 0 | Status: DISCONTINUED | OUTPATIENT
Start: 2021-01-01 | End: 2021-01-01

## 2021-01-01 RX ORDER — ONDANSETRON 8 MG/1
4 TABLET, FILM COATED ORAL ONCE
Refills: 0 | Status: DISCONTINUED | OUTPATIENT
Start: 2021-01-01 | End: 2021-01-01

## 2021-01-01 RX ORDER — DEXTROSE 50 % IN WATER 50 %
12.5 SYRINGE (ML) INTRAVENOUS ONCE
Refills: 0 | Status: DISCONTINUED | OUTPATIENT
Start: 2021-01-01 | End: 2021-01-01

## 2021-01-01 RX ORDER — INSULIN GLARGINE 100 [IU]/ML
0 INJECTION, SOLUTION SUBCUTANEOUS
Qty: 0 | Refills: 0 | DISCHARGE

## 2021-01-01 RX ORDER — SODIUM BICARBONATE 1 MEQ/ML
975 SYRINGE (ML) INTRAVENOUS ONCE
Refills: 0 | Status: COMPLETED | OUTPATIENT
Start: 2021-01-01 | End: 2021-01-01

## 2021-01-01 RX ORDER — SACUBITRIL AND VALSARTAN 24; 26 MG/1; MG/1
1 TABLET, FILM COATED ORAL
Refills: 0 | Status: DISCONTINUED | OUTPATIENT
Start: 2021-01-01 | End: 2021-01-01

## 2021-01-01 RX ORDER — INSULIN HUMAN 100 [IU]/ML
1 INJECTION, SOLUTION SUBCUTANEOUS
Qty: 50 | Refills: 0 | Status: DISCONTINUED | OUTPATIENT
Start: 2021-01-01 | End: 2021-01-01

## 2021-01-01 RX ORDER — POTASSIUM CHLORIDE 20 MEQ
10 PACKET (EA) ORAL ONCE
Refills: 0 | Status: COMPLETED | OUTPATIENT
Start: 2021-01-01 | End: 2021-01-01

## 2021-01-01 RX ORDER — INSULIN LISPRO 100/ML
15 VIAL (ML) SUBCUTANEOUS
Refills: 0 | Status: DISCONTINUED | OUTPATIENT
Start: 2021-01-01 | End: 2021-01-01

## 2021-01-01 RX ORDER — HALOPERIDOL DECANOATE 100 MG/ML
1 INJECTION INTRAMUSCULAR ONCE
Refills: 0 | Status: COMPLETED | OUTPATIENT
Start: 2021-01-01 | End: 2021-01-01

## 2021-01-01 RX ORDER — INSULIN LISPRO 100/ML
VIAL (ML) SUBCUTANEOUS
Refills: 0 | Status: DISCONTINUED | OUTPATIENT
Start: 2021-01-01 | End: 2021-01-01

## 2021-01-01 RX ORDER — INSULIN GLARGINE 100 [IU]/ML
20 INJECTION, SOLUTION SUBCUTANEOUS ONCE
Refills: 0 | Status: COMPLETED | OUTPATIENT
Start: 2021-01-01 | End: 2021-01-01

## 2021-01-01 RX ORDER — SPIRONOLACTONE 25 MG/1
25 TABLET, FILM COATED ORAL DAILY
Refills: 0 | Status: DISCONTINUED | OUTPATIENT
Start: 2021-01-01 | End: 2022-01-01

## 2021-01-01 RX ORDER — INSULIN LISPRO 100/ML
VIAL (ML) SUBCUTANEOUS AT BEDTIME
Refills: 0 | Status: DISCONTINUED | OUTPATIENT
Start: 2021-01-01 | End: 2022-01-01

## 2021-01-01 RX ORDER — DEXTROSE 50 % IN WATER 50 %
15 SYRINGE (ML) INTRAVENOUS ONCE
Refills: 0 | Status: DISCONTINUED | OUTPATIENT
Start: 2021-01-01 | End: 2022-01-01

## 2021-01-01 RX ORDER — SODIUM CHLORIDE 9 MG/ML
1000 INJECTION, SOLUTION INTRAVENOUS ONCE
Refills: 0 | Status: COMPLETED | OUTPATIENT
Start: 2021-01-01 | End: 2021-01-01

## 2021-01-01 RX ORDER — GLUCAGON INJECTION, SOLUTION 0.5 MG/.1ML
1 INJECTION, SOLUTION SUBCUTANEOUS ONCE
Refills: 0 | Status: DISCONTINUED | OUTPATIENT
Start: 2021-01-01 | End: 2022-01-01

## 2021-01-01 RX ORDER — CHLORHEXIDINE GLUCONATE 213 G/1000ML
1 SOLUTION TOPICAL ONCE
Refills: 0 | Status: DISCONTINUED | OUTPATIENT
Start: 2021-01-01 | End: 2021-01-01

## 2021-01-01 RX ORDER — ASPIRIN/CALCIUM CARB/MAGNESIUM 324 MG
1 TABLET ORAL
Qty: 0 | Refills: 0 | DISCHARGE

## 2021-01-01 RX ORDER — LEVETIRACETAM 250 MG/1
750 TABLET, FILM COATED ORAL
Refills: 0 | Status: DISCONTINUED | OUTPATIENT
Start: 2021-01-01 | End: 2021-01-01

## 2021-01-01 RX ORDER — INSULIN ASPART 100 [IU]/ML
15 INJECTION, SOLUTION SUBCUTANEOUS
Qty: 0 | Refills: 0 | DISCHARGE

## 2021-01-01 RX ORDER — CEFTRIAXONE 500 MG/1
1000 INJECTION, POWDER, FOR SOLUTION INTRAMUSCULAR; INTRAVENOUS ONCE
Refills: 0 | Status: DISCONTINUED | OUTPATIENT
Start: 2021-01-01 | End: 2021-01-01

## 2021-01-01 RX ORDER — CEPHALEXIN 500 MG
1 CAPSULE ORAL
Qty: 40 | Refills: 0
Start: 2021-01-01 | End: 2021-01-01

## 2021-01-01 RX ORDER — LOPERAMIDE HCL 2 MG
2 TABLET ORAL ONCE
Refills: 0 | Status: COMPLETED | OUTPATIENT
Start: 2021-01-01 | End: 2021-01-01

## 2021-01-01 RX ORDER — ASPIRIN/CALCIUM CARB/MAGNESIUM 324 MG
81 TABLET ORAL DAILY
Refills: 0 | Status: DISCONTINUED | OUTPATIENT
Start: 2021-01-01 | End: 2021-01-01

## 2021-01-01 RX ORDER — CARVEDILOL PHOSPHATE 80 MG/1
1 CAPSULE, EXTENDED RELEASE ORAL
Qty: 0 | Refills: 0 | DISCHARGE

## 2021-01-01 RX ORDER — INSULIN LISPRO 100/ML
VIAL (ML) SUBCUTANEOUS
Refills: 0 | Status: DISCONTINUED | OUTPATIENT
Start: 2021-01-01 | End: 2022-01-01

## 2021-01-01 RX ADMIN — ONDANSETRON 4 MILLIGRAM(S): 8 TABLET, FILM COATED ORAL at 15:19

## 2021-01-01 RX ADMIN — PIPERACILLIN AND TAZOBACTAM 200 GRAM(S): 4; .5 INJECTION, POWDER, LYOPHILIZED, FOR SOLUTION INTRAVENOUS at 12:41

## 2021-01-01 RX ADMIN — Medication 100 MILLIEQUIVALENT(S): at 07:18

## 2021-01-01 RX ADMIN — GABAPENTIN 100 MILLIGRAM(S): 400 CAPSULE ORAL at 22:00

## 2021-01-01 RX ADMIN — Medication 10 MILLIGRAM(S): at 19:04

## 2021-01-01 RX ADMIN — ONDANSETRON 4 MILLIGRAM(S): 8 TABLET, FILM COATED ORAL at 12:02

## 2021-01-01 RX ADMIN — PIPERACILLIN AND TAZOBACTAM 25 GRAM(S): 4; .5 INJECTION, POWDER, LYOPHILIZED, FOR SOLUTION INTRAVENOUS at 21:55

## 2021-01-01 RX ADMIN — Medication 325 MILLIGRAM(S): at 21:11

## 2021-01-01 RX ADMIN — GABAPENTIN 100 MILLIGRAM(S): 400 CAPSULE ORAL at 06:26

## 2021-01-01 RX ADMIN — PIPERACILLIN AND TAZOBACTAM 200 GRAM(S): 4; .5 INJECTION, POWDER, LYOPHILIZED, FOR SOLUTION INTRAVENOUS at 23:25

## 2021-01-01 RX ADMIN — ATORVASTATIN CALCIUM 40 MILLIGRAM(S): 80 TABLET, FILM COATED ORAL at 13:17

## 2021-01-01 RX ADMIN — INSULIN HUMAN 1 UNIT(S)/HR: 100 INJECTION, SOLUTION SUBCUTANEOUS at 04:44

## 2021-01-01 RX ADMIN — Medication 81 MILLIGRAM(S): at 12:00

## 2021-01-01 RX ADMIN — PIPERACILLIN AND TAZOBACTAM 25 GRAM(S): 4; .5 INJECTION, POWDER, LYOPHILIZED, FOR SOLUTION INTRAVENOUS at 05:35

## 2021-01-01 RX ADMIN — SACUBITRIL AND VALSARTAN 1 TABLET(S): 24; 26 TABLET, FILM COATED ORAL at 19:04

## 2021-01-01 RX ADMIN — LEVETIRACETAM 750 MILLIGRAM(S): 250 TABLET, FILM COATED ORAL at 17:02

## 2021-01-01 RX ADMIN — INSULIN HUMAN 1 UNIT(S)/HR: 100 INJECTION, SOLUTION SUBCUTANEOUS at 08:32

## 2021-01-01 RX ADMIN — SODIUM CHLORIDE 3 MILLILITER(S): 9 INJECTION INTRAMUSCULAR; INTRAVENOUS; SUBCUTANEOUS at 19:24

## 2021-01-01 RX ADMIN — HEPARIN SODIUM 5000 UNIT(S): 5000 INJECTION INTRAVENOUS; SUBCUTANEOUS at 05:33

## 2021-01-01 RX ADMIN — SODIUM CHLORIDE 100 MILLILITER(S): 9 INJECTION, SOLUTION INTRAVENOUS at 04:55

## 2021-01-01 RX ADMIN — SODIUM CHLORIDE 3 MILLILITER(S): 9 INJECTION INTRAMUSCULAR; INTRAVENOUS; SUBCUTANEOUS at 06:45

## 2021-01-01 RX ADMIN — GABAPENTIN 100 MILLIGRAM(S): 400 CAPSULE ORAL at 05:23

## 2021-01-01 RX ADMIN — INSULIN HUMAN 10 UNIT(S): 100 INJECTION, SOLUTION SUBCUTANEOUS at 16:51

## 2021-01-01 RX ADMIN — GABAPENTIN 100 MILLIGRAM(S): 400 CAPSULE ORAL at 06:20

## 2021-01-01 RX ADMIN — CARVEDILOL PHOSPHATE 3.12 MILLIGRAM(S): 80 CAPSULE, EXTENDED RELEASE ORAL at 06:54

## 2021-01-01 RX ADMIN — HALOPERIDOL DECANOATE 1 MILLIGRAM(S): 100 INJECTION INTRAMUSCULAR at 13:45

## 2021-01-01 RX ADMIN — Medication 2 MILLIGRAM(S): at 12:31

## 2021-01-01 RX ADMIN — PIPERACILLIN AND TAZOBACTAM 25 GRAM(S): 4; .5 INJECTION, POWDER, LYOPHILIZED, FOR SOLUTION INTRAVENOUS at 15:58

## 2021-01-01 RX ADMIN — TICAGRELOR 90 MILLIGRAM(S): 90 TABLET ORAL at 18:39

## 2021-01-01 RX ADMIN — SODIUM CHLORIDE 3 MILLILITER(S): 9 INJECTION INTRAMUSCULAR; INTRAVENOUS; SUBCUTANEOUS at 05:20

## 2021-01-01 RX ADMIN — SODIUM CHLORIDE 3 MILLILITER(S): 9 INJECTION INTRAMUSCULAR; INTRAVENOUS; SUBCUTANEOUS at 13:48

## 2021-01-01 RX ADMIN — LEVETIRACETAM 750 MILLIGRAM(S): 250 TABLET, FILM COATED ORAL at 17:58

## 2021-01-01 RX ADMIN — LEVETIRACETAM 750 MILLIGRAM(S): 250 TABLET, FILM COATED ORAL at 18:34

## 2021-01-01 RX ADMIN — ATORVASTATIN CALCIUM 40 MILLIGRAM(S): 80 TABLET, FILM COATED ORAL at 21:55

## 2021-01-01 RX ADMIN — SACUBITRIL AND VALSARTAN 1 TABLET(S): 24; 26 TABLET, FILM COATED ORAL at 06:21

## 2021-01-01 RX ADMIN — SPIRONOLACTONE 25 MILLIGRAM(S): 25 TABLET, FILM COATED ORAL at 06:20

## 2021-01-01 RX ADMIN — SODIUM CHLORIDE 3 MILLILITER(S): 9 INJECTION INTRAMUSCULAR; INTRAVENOUS; SUBCUTANEOUS at 01:33

## 2021-01-01 RX ADMIN — SACUBITRIL AND VALSARTAN 1 TABLET(S): 24; 26 TABLET, FILM COATED ORAL at 18:34

## 2021-01-01 RX ADMIN — SODIUM CHLORIDE 1000 MILLILITER(S): 9 INJECTION INTRAMUSCULAR; INTRAVENOUS; SUBCUTANEOUS at 16:52

## 2021-01-01 RX ADMIN — GABAPENTIN 100 MILLIGRAM(S): 400 CAPSULE ORAL at 05:35

## 2021-01-01 RX ADMIN — PIPERACILLIN AND TAZOBACTAM 25 GRAM(S): 4; .5 INJECTION, POWDER, LYOPHILIZED, FOR SOLUTION INTRAVENOUS at 03:01

## 2021-01-01 RX ADMIN — LEVETIRACETAM 750 MILLIGRAM(S): 250 TABLET, FILM COATED ORAL at 06:54

## 2021-01-01 RX ADMIN — INSULIN HUMAN 6 UNIT(S)/HR: 100 INJECTION, SOLUTION SUBCUTANEOUS at 01:24

## 2021-01-01 RX ADMIN — TICAGRELOR 90 MILLIGRAM(S): 90 TABLET ORAL at 05:24

## 2021-01-01 RX ADMIN — PIPERACILLIN AND TAZOBACTAM 25 GRAM(S): 4; .5 INJECTION, POWDER, LYOPHILIZED, FOR SOLUTION INTRAVENOUS at 08:47

## 2021-01-01 RX ADMIN — Medication 81 MILLIGRAM(S): at 13:16

## 2021-01-01 RX ADMIN — GABAPENTIN 100 MILLIGRAM(S): 400 CAPSULE ORAL at 21:44

## 2021-01-01 RX ADMIN — Medication 100 MILLIEQUIVALENT(S): at 05:05

## 2021-01-01 RX ADMIN — SODIUM CHLORIDE 2000 MILLILITER(S): 9 INJECTION INTRAMUSCULAR; INTRAVENOUS; SUBCUTANEOUS at 12:16

## 2021-01-01 RX ADMIN — HEPARIN SODIUM 5000 UNIT(S): 5000 INJECTION INTRAVENOUS; SUBCUTANEOUS at 17:02

## 2021-01-01 RX ADMIN — SACUBITRIL AND VALSARTAN 1 TABLET(S): 24; 26 TABLET, FILM COATED ORAL at 06:20

## 2021-01-01 RX ADMIN — SODIUM CHLORIDE 1000 MILLILITER(S): 9 INJECTION INTRAMUSCULAR; INTRAVENOUS; SUBCUTANEOUS at 15:19

## 2021-01-01 RX ADMIN — PIPERACILLIN AND TAZOBACTAM 25 GRAM(S): 4; .5 INJECTION, POWDER, LYOPHILIZED, FOR SOLUTION INTRAVENOUS at 00:00

## 2021-01-01 RX ADMIN — SODIUM CHLORIDE 100 MILLILITER(S): 9 INJECTION, SOLUTION INTRAVENOUS at 13:45

## 2021-01-01 RX ADMIN — GABAPENTIN 100 MILLIGRAM(S): 400 CAPSULE ORAL at 13:46

## 2021-01-01 RX ADMIN — SODIUM CHLORIDE 1000 MILLILITER(S): 9 INJECTION INTRAMUSCULAR; INTRAVENOUS; SUBCUTANEOUS at 17:03

## 2021-01-01 RX ADMIN — SODIUM CHLORIDE 100 MILLILITER(S): 9 INJECTION, SOLUTION INTRAVENOUS at 19:45

## 2021-01-01 RX ADMIN — Medication 100 GRAM(S): at 13:14

## 2021-01-01 RX ADMIN — Medication 40 MILLIEQUIVALENT(S): at 13:14

## 2021-01-01 RX ADMIN — INSULIN GLARGINE 20 UNIT(S): 100 INJECTION, SOLUTION SUBCUTANEOUS at 09:14

## 2021-01-01 RX ADMIN — TICAGRELOR 90 MILLIGRAM(S): 90 TABLET ORAL at 17:03

## 2021-01-01 RX ADMIN — CARVEDILOL PHOSPHATE 3.12 MILLIGRAM(S): 80 CAPSULE, EXTENDED RELEASE ORAL at 18:39

## 2021-01-01 RX ADMIN — GABAPENTIN 100 MILLIGRAM(S): 400 CAPSULE ORAL at 21:10

## 2021-01-01 RX ADMIN — LEVETIRACETAM 750 MILLIGRAM(S): 250 TABLET, FILM COATED ORAL at 05:34

## 2021-01-01 RX ADMIN — LEVETIRACETAM 750 MILLIGRAM(S): 250 TABLET, FILM COATED ORAL at 19:04

## 2021-01-01 RX ADMIN — Medication 81 MILLIGRAM(S): at 12:04

## 2021-01-01 RX ADMIN — GABAPENTIN 100 MILLIGRAM(S): 400 CAPSULE ORAL at 22:56

## 2021-01-01 RX ADMIN — HEPARIN SODIUM 5000 UNIT(S): 5000 INJECTION INTRAVENOUS; SUBCUTANEOUS at 17:58

## 2021-01-01 RX ADMIN — SODIUM CHLORIDE 3 MILLILITER(S): 9 INJECTION INTRAMUSCULAR; INTRAVENOUS; SUBCUTANEOUS at 11:46

## 2021-01-01 RX ADMIN — LEVETIRACETAM 750 MILLIGRAM(S): 250 TABLET, FILM COATED ORAL at 06:20

## 2021-01-01 RX ADMIN — PIPERACILLIN AND TAZOBACTAM 25 GRAM(S): 4; .5 INJECTION, POWDER, LYOPHILIZED, FOR SOLUTION INTRAVENOUS at 17:01

## 2021-01-01 RX ADMIN — HEPARIN SODIUM 5000 UNIT(S): 5000 INJECTION INTRAVENOUS; SUBCUTANEOUS at 06:20

## 2021-01-01 RX ADMIN — Medication 81 MILLIGRAM(S): at 12:31

## 2021-01-01 RX ADMIN — TICAGRELOR 90 MILLIGRAM(S): 90 TABLET ORAL at 06:20

## 2021-01-01 RX ADMIN — Medication 100 MILLIGRAM(S): at 14:56

## 2021-01-01 RX ADMIN — Medication 81 MILLIGRAM(S): at 13:46

## 2021-01-01 RX ADMIN — CARVEDILOL PHOSPHATE 3.12 MILLIGRAM(S): 80 CAPSULE, EXTENDED RELEASE ORAL at 05:34

## 2021-01-01 RX ADMIN — PIPERACILLIN AND TAZOBACTAM 25 GRAM(S): 4; .5 INJECTION, POWDER, LYOPHILIZED, FOR SOLUTION INTRAVENOUS at 08:08

## 2021-01-01 RX ADMIN — TICAGRELOR 90 MILLIGRAM(S): 90 TABLET ORAL at 05:36

## 2021-01-01 RX ADMIN — ENOXAPARIN SODIUM 40 MILLIGRAM(S): 100 INJECTION SUBCUTANEOUS at 12:04

## 2021-01-01 RX ADMIN — CARVEDILOL PHOSPHATE 3.12 MILLIGRAM(S): 80 CAPSULE, EXTENDED RELEASE ORAL at 05:24

## 2021-01-01 RX ADMIN — LEVETIRACETAM 750 MILLIGRAM(S): 250 TABLET, FILM COATED ORAL at 18:39

## 2021-01-01 RX ADMIN — SODIUM CHLORIDE 100 MILLILITER(S): 9 INJECTION, SOLUTION INTRAVENOUS at 14:23

## 2021-01-01 RX ADMIN — INSULIN HUMAN 7 UNIT(S)/HR: 100 INJECTION, SOLUTION SUBCUTANEOUS at 23:13

## 2021-01-01 RX ADMIN — Medication 40 MILLIGRAM(S): at 18:34

## 2021-01-01 RX ADMIN — GABAPENTIN 100 MILLIGRAM(S): 400 CAPSULE ORAL at 14:23

## 2021-01-01 RX ADMIN — GABAPENTIN 100 MILLIGRAM(S): 400 CAPSULE ORAL at 15:09

## 2021-01-01 RX ADMIN — SODIUM CHLORIDE 1000 MILLILITER(S): 9 INJECTION, SOLUTION INTRAVENOUS at 19:50

## 2021-01-01 RX ADMIN — GABAPENTIN 100 MILLIGRAM(S): 400 CAPSULE ORAL at 06:56

## 2021-01-01 RX ADMIN — SODIUM CHLORIDE 2000 MILLILITER(S): 9 INJECTION INTRAMUSCULAR; INTRAVENOUS; SUBCUTANEOUS at 13:43

## 2021-01-01 RX ADMIN — SODIUM CHLORIDE 150 MILLILITER(S): 9 INJECTION, SOLUTION INTRAVENOUS at 01:24

## 2021-01-01 RX ADMIN — HEPARIN SODIUM 5000 UNIT(S): 5000 INJECTION INTRAVENOUS; SUBCUTANEOUS at 18:39

## 2021-01-01 RX ADMIN — Medication 40 MILLIEQUIVALENT(S): at 08:32

## 2021-01-01 RX ADMIN — CARVEDILOL PHOSPHATE 3.12 MILLIGRAM(S): 80 CAPSULE, EXTENDED RELEASE ORAL at 17:59

## 2021-01-01 RX ADMIN — PIPERACILLIN AND TAZOBACTAM 25 GRAM(S): 4; .5 INJECTION, POWDER, LYOPHILIZED, FOR SOLUTION INTRAVENOUS at 13:46

## 2021-01-01 RX ADMIN — SODIUM CHLORIDE 3 MILLILITER(S): 9 INJECTION INTRAMUSCULAR; INTRAVENOUS; SUBCUTANEOUS at 19:46

## 2021-01-01 RX ADMIN — Medication 975 MILLIGRAM(S): at 14:24

## 2021-01-01 RX ADMIN — PIPERACILLIN AND TAZOBACTAM 3.38 GRAM(S): 4; .5 INJECTION, POWDER, LYOPHILIZED, FOR SOLUTION INTRAVENOUS at 13:43

## 2021-01-01 RX ADMIN — Medication 40 MILLIGRAM(S): at 17:15

## 2021-01-01 RX ADMIN — SODIUM CHLORIDE 3 MILLILITER(S): 9 INJECTION INTRAMUSCULAR; INTRAVENOUS; SUBCUTANEOUS at 15:43

## 2021-01-01 RX ADMIN — TICAGRELOR 90 MILLIGRAM(S): 90 TABLET ORAL at 13:14

## 2021-01-01 RX ADMIN — CARVEDILOL PHOSPHATE 3.12 MILLIGRAM(S): 80 CAPSULE, EXTENDED RELEASE ORAL at 17:03

## 2021-01-01 RX ADMIN — GABAPENTIN 100 MILLIGRAM(S): 400 CAPSULE ORAL at 14:28

## 2021-01-01 RX ADMIN — TICAGRELOR 90 MILLIGRAM(S): 90 TABLET ORAL at 18:47

## 2021-01-01 RX ADMIN — ONDANSETRON 4 MILLIGRAM(S): 8 TABLET, FILM COATED ORAL at 16:52

## 2021-01-01 RX ADMIN — GABAPENTIN 100 MILLIGRAM(S): 400 CAPSULE ORAL at 13:14

## 2021-01-01 RX ADMIN — CARVEDILOL PHOSPHATE 3.12 MILLIGRAM(S): 80 CAPSULE, EXTENDED RELEASE ORAL at 14:24

## 2021-01-01 RX ADMIN — INSULIN GLARGINE 20 UNIT(S): 100 INJECTION, SOLUTION SUBCUTANEOUS at 10:09

## 2021-01-01 RX ADMIN — HEPARIN SODIUM 5000 UNIT(S): 5000 INJECTION INTRAVENOUS; SUBCUTANEOUS at 06:54

## 2021-01-01 RX ADMIN — LEVETIRACETAM 750 MILLIGRAM(S): 250 TABLET, FILM COATED ORAL at 05:23

## 2021-01-01 RX ADMIN — HEPARIN SODIUM 5000 UNIT(S): 5000 INJECTION INTRAVENOUS; SUBCUTANEOUS at 05:23

## 2021-01-01 RX ADMIN — LEVETIRACETAM 750 MILLIGRAM(S): 250 TABLET, FILM COATED ORAL at 14:24

## 2021-01-01 RX ADMIN — ATORVASTATIN CALCIUM 40 MILLIGRAM(S): 80 TABLET, FILM COATED ORAL at 12:01

## 2021-01-01 RX ADMIN — SODIUM CHLORIDE 3 MILLILITER(S): 9 INJECTION INTRAMUSCULAR; INTRAVENOUS; SUBCUTANEOUS at 05:22

## 2021-01-01 RX ADMIN — GABAPENTIN 100 MILLIGRAM(S): 400 CAPSULE ORAL at 21:55

## 2021-01-01 RX ADMIN — Medication 10 MILLIGRAM(S): at 19:03

## 2021-01-01 RX ADMIN — TICAGRELOR 90 MILLIGRAM(S): 90 TABLET ORAL at 06:53

## 2021-01-01 RX ADMIN — Medication 100 MILLIEQUIVALENT(S): at 06:20

## 2021-01-01 RX ADMIN — SODIUM CHLORIDE 3 MILLILITER(S): 9 INJECTION INTRAMUSCULAR; INTRAVENOUS; SUBCUTANEOUS at 15:10

## 2021-01-01 RX ADMIN — Medication 40 MILLIGRAM(S): at 06:20

## 2021-01-02 LAB — SARS-COV-2 N GENE NPH QL NAA+PROBE: NOT DETECTED

## 2021-01-04 ENCOUNTER — TRANSCRIPTION ENCOUNTER (OUTPATIENT)
Age: 66
End: 2021-01-04

## 2021-01-04 ENCOUNTER — INPATIENT (INPATIENT)
Facility: HOSPITAL | Age: 66
LOS: 3 days | Discharge: HOME | DRG: 247 | End: 2021-01-08
Attending: FAMILY MEDICINE | Admitting: INTERNAL MEDICINE
Payer: MEDICARE

## 2021-01-04 VITALS
OXYGEN SATURATION: 98 % | HEART RATE: 74 BPM | TEMPERATURE: 98 F | SYSTOLIC BLOOD PRESSURE: 147 MMHG | RESPIRATION RATE: 16 BRPM | DIASTOLIC BLOOD PRESSURE: 88 MMHG

## 2021-01-04 DIAGNOSIS — I50.1 LEFT VENTRICULAR FAILURE, UNSPECIFIED: ICD-10-CM

## 2021-01-04 DIAGNOSIS — R94.39 ABNORMAL RESULT OF OTHER CARDIOVASCULAR FUNCTION STUDY: ICD-10-CM

## 2021-01-04 DIAGNOSIS — Z95.1 PRESENCE OF AORTOCORONARY BYPASS GRAFT: Chronic | ICD-10-CM

## 2021-01-04 LAB
ANION GAP SERPL CALC-SCNC: 10 MMOL/L — SIGNIFICANT CHANGE UP (ref 5–17)
APTT BLD: 34.6 SEC — SIGNIFICANT CHANGE UP (ref 27.5–35.5)
BLD GP AB SCN SERPL QL: SIGNIFICANT CHANGE UP
BUN SERPL-MCNC: 24 MG/DL — HIGH (ref 8–20)
CALCIUM SERPL-MCNC: 8.9 MG/DL — SIGNIFICANT CHANGE UP (ref 8.6–10.2)
CHLORIDE SERPL-SCNC: 98 MMOL/L — SIGNIFICANT CHANGE UP (ref 98–107)
CO2 SERPL-SCNC: 33 MMOL/L — HIGH (ref 22–29)
CREAT SERPL-MCNC: 1.01 MG/DL — SIGNIFICANT CHANGE UP (ref 0.5–1.3)
GLUCOSE BLDC GLUCOMTR-MCNC: 168 MG/DL — HIGH (ref 70–99)
GLUCOSE SERPL-MCNC: 127 MG/DL — HIGH (ref 70–99)
HCT VFR BLD CALC: 38.5 % — LOW (ref 39–50)
HGB BLD-MCNC: 12.6 G/DL — LOW (ref 13–17)
INR BLD: 1.19 RATIO — HIGH (ref 0.88–1.16)
MCHC RBC-ENTMCNC: 28.5 PG — SIGNIFICANT CHANGE UP (ref 27–34)
MCHC RBC-ENTMCNC: 32.7 GM/DL — SIGNIFICANT CHANGE UP (ref 32–36)
MCV RBC AUTO: 87.1 FL — SIGNIFICANT CHANGE UP (ref 80–100)
PLATELET # BLD AUTO: 167 K/UL — SIGNIFICANT CHANGE UP (ref 150–400)
POTASSIUM SERPL-MCNC: 3.6 MMOL/L — SIGNIFICANT CHANGE UP (ref 3.5–5.3)
POTASSIUM SERPL-SCNC: 3.6 MMOL/L — SIGNIFICANT CHANGE UP (ref 3.5–5.3)
PROTHROM AB SERPL-ACNC: 13.7 SEC — HIGH (ref 10.6–13.6)
RBC # BLD: 4.42 M/UL — SIGNIFICANT CHANGE UP (ref 4.2–5.8)
RBC # FLD: 15 % — HIGH (ref 10.3–14.5)
SODIUM SERPL-SCNC: 141 MMOL/L — SIGNIFICANT CHANGE UP (ref 135–145)
WBC # BLD: 6.91 K/UL — SIGNIFICANT CHANGE UP (ref 3.8–10.5)
WBC # FLD AUTO: 6.91 K/UL — SIGNIFICANT CHANGE UP (ref 3.8–10.5)

## 2021-01-04 PROCEDURE — 93010 ELECTROCARDIOGRAM REPORT: CPT

## 2021-01-04 RX ORDER — GABAPENTIN 400 MG/1
100 CAPSULE ORAL THREE TIMES A DAY
Refills: 0 | Status: DISCONTINUED | OUTPATIENT
Start: 2021-01-04 | End: 2021-01-08

## 2021-01-04 RX ORDER — CARVEDILOL PHOSPHATE 80 MG/1
12.5 CAPSULE, EXTENDED RELEASE ORAL EVERY 12 HOURS
Refills: 0 | Status: DISCONTINUED | OUTPATIENT
Start: 2021-01-04 | End: 2021-01-08

## 2021-01-04 RX ORDER — SACUBITRIL AND VALSARTAN 24; 26 MG/1; MG/1
1 TABLET, FILM COATED ORAL
Refills: 0 | Status: DISCONTINUED | OUTPATIENT
Start: 2021-01-04 | End: 2021-01-08

## 2021-01-04 RX ORDER — DEXTROSE 50 % IN WATER 50 %
25 SYRINGE (ML) INTRAVENOUS ONCE
Refills: 0 | Status: DISCONTINUED | OUTPATIENT
Start: 2021-01-04 | End: 2021-01-08

## 2021-01-04 RX ORDER — GLUCAGON INJECTION, SOLUTION 0.5 MG/.1ML
1 INJECTION, SOLUTION SUBCUTANEOUS ONCE
Refills: 0 | Status: DISCONTINUED | OUTPATIENT
Start: 2021-01-04 | End: 2021-01-08

## 2021-01-04 RX ORDER — TICAGRELOR 90 MG/1
1 TABLET ORAL
Qty: 180 | Refills: 3
Start: 2021-01-04 | End: 2021-01-01

## 2021-01-04 RX ORDER — INSULIN GLARGINE 100 [IU]/ML
10 INJECTION, SOLUTION SUBCUTANEOUS AT BEDTIME
Refills: 0 | Status: DISCONTINUED | OUTPATIENT
Start: 2021-01-04 | End: 2021-01-08

## 2021-01-04 RX ORDER — DEXTROSE 50 % IN WATER 50 %
15 SYRINGE (ML) INTRAVENOUS ONCE
Refills: 0 | Status: DISCONTINUED | OUTPATIENT
Start: 2021-01-04 | End: 2021-01-08

## 2021-01-04 RX ORDER — TICAGRELOR 90 MG/1
90 TABLET ORAL EVERY 12 HOURS
Refills: 0 | Status: DISCONTINUED | OUTPATIENT
Start: 2021-01-05 | End: 2021-01-08

## 2021-01-04 RX ORDER — ASPIRIN/CALCIUM CARB/MAGNESIUM 324 MG
81 TABLET ORAL DAILY
Refills: 0 | Status: DISCONTINUED | OUTPATIENT
Start: 2021-01-04 | End: 2021-01-08

## 2021-01-04 RX ORDER — INSULIN LISPRO 100/ML
VIAL (ML) SUBCUTANEOUS
Refills: 0 | Status: DISCONTINUED | OUTPATIENT
Start: 2021-01-04 | End: 2021-01-08

## 2021-01-04 RX ORDER — INSULIN GLARGINE 100 [IU]/ML
20 INJECTION, SOLUTION SUBCUTANEOUS AT BEDTIME
Refills: 0 | Status: DISCONTINUED | OUTPATIENT
Start: 2021-01-04 | End: 2021-01-04

## 2021-01-04 RX ORDER — DEXTROSE 50 % IN WATER 50 %
12.5 SYRINGE (ML) INTRAVENOUS ONCE
Refills: 0 | Status: DISCONTINUED | OUTPATIENT
Start: 2021-01-04 | End: 2021-01-08

## 2021-01-04 RX ORDER — FUROSEMIDE 40 MG
20 TABLET ORAL
Refills: 0 | Status: DISCONTINUED | OUTPATIENT
Start: 2021-01-04 | End: 2021-01-05

## 2021-01-04 RX ORDER — SODIUM CHLORIDE 9 MG/ML
1000 INJECTION, SOLUTION INTRAVENOUS
Refills: 0 | Status: DISCONTINUED | OUTPATIENT
Start: 2021-01-04 | End: 2021-01-08

## 2021-01-04 RX ORDER — ATORVASTATIN CALCIUM 80 MG/1
40 TABLET, FILM COATED ORAL AT BEDTIME
Refills: 0 | Status: DISCONTINUED | OUTPATIENT
Start: 2021-01-04 | End: 2021-01-08

## 2021-01-04 RX ORDER — TICAGRELOR 90 MG/1
180 TABLET ORAL ONCE
Refills: 0 | Status: DISCONTINUED | OUTPATIENT
Start: 2021-01-04 | End: 2021-01-04

## 2021-01-04 RX ADMIN — CARVEDILOL PHOSPHATE 12.5 MILLIGRAM(S): 80 CAPSULE, EXTENDED RELEASE ORAL at 19:55

## 2021-01-04 RX ADMIN — ATORVASTATIN CALCIUM 40 MILLIGRAM(S): 80 TABLET, FILM COATED ORAL at 21:51

## 2021-01-04 RX ADMIN — Medication 20 MILLIGRAM(S): at 19:55

## 2021-01-04 RX ADMIN — SACUBITRIL AND VALSARTAN 1 TABLET(S): 24; 26 TABLET, FILM COATED ORAL at 19:59

## 2021-01-04 RX ADMIN — GABAPENTIN 100 MILLIGRAM(S): 400 CAPSULE ORAL at 21:52

## 2021-01-04 RX ADMIN — Medication 2: at 21:09

## 2021-01-04 NOTE — DISCHARGE NOTE PROVIDER - NSDCMRMEDTOKEN_GEN_ALL_CORE_FT
aspirin 81 mg oral delayed release tablet: 1 tab(s) orally once a day  atorvastatin 40 mg oral tablet: 1 tab(s) orally once a day  carvedilol 12.5 mg oral tablet: 1 tab(s) orally 2 times a day  Entresto 49 mg-51 mg oral tablet: 1 tab(s) orally 2 times a day  furosemide 20 mg oral tablet: 1 tab(s) orally 2 times a day  gabapentin 100 mg oral capsule: 1 cap(s) orally 3 times a day  Lantus 100 units/mL subcutaneous solution: 20 unit(s) subcutaneous once a day (at bedtime)  Nitrostat 0.4 mg sublingual tablet: 1 tab(s) sublingual every 5 minutes  NovoLOG 100 units/mL subcutaneous solution: 15 unit(s) subcutaneous   aspirin 81 mg oral delayed release tablet: 1 tab(s) orally once a day  atorvastatin 40 mg oral tablet: 1 tab(s) orally once a day  carvedilol 12.5 mg oral tablet: 1 tab(s) orally 2 times a day  Entresto 49 mg-51 mg oral tablet: 1 tab(s) orally 2 times a day  furosemide 20 mg oral tablet: 1 tab(s) orally 2 times a day  gabapentin 100 mg oral capsule: 1 cap(s) orally 3 times a day  Lantus 100 units/mL subcutaneous solution: 20 unit(s) subcutaneous once a day (at bedtime)  Nitrostat 0.4 mg sublingual tablet: 1 tab(s) sublingual every 5 minutes  NovoLOG 100 units/mL subcutaneous solution: 15 unit(s) subcutaneous  ticagrelor 90 mg oral tablet: 1 tab(s) orally every 12 hours to start 1/5/21    aspirin 81 mg oral delayed release tablet: 1 tab(s) orally once a day  atorvastatin 40 mg oral tablet: 1 tab(s) orally once a day  carvedilol 12.5 mg oral tablet: 1 tab(s) orally 2 times a day  Entresto 49 mg-51 mg oral tablet: 1 tab(s) orally 2 times a day  gabapentin 100 mg oral capsule: 1 cap(s) orally 3 times a day  Lantus 100 units/mL subcutaneous solution: 20 unit(s) subcutaneous once a day (at bedtime)  Lasix 40 mg oral tablet: 1 tab(s) orally 2 times a day  Nitrostat 0.4 mg sublingual tablet: 1 tab(s) sublingual every 5 minutes  NovoLOG 100 units/mL subcutaneous solution: 15 unit(s) subcutaneous  ticagrelor 90 mg oral tablet: 1 tab(s) orally every 12 hours to start 1/5/21

## 2021-01-04 NOTE — DISCHARGE NOTE PROVIDER - PROVIDER TOKENS
PROVIDER:[TOKEN:[[id number]]] PROVIDER:[TOKEN:[[id number]]],PROVIDER:[TOKEN:[[id number]],FOLLOWUP:[2 weeks]]

## 2021-01-04 NOTE — DISCHARGE NOTE PROVIDER - HOSPITAL COURSE
65 year old male with h/o HTN, HLD, DM, CAD s/p cardiac cath 4/14/10 with confirmed LAD occlusion now s/p single-vessel CABG 4/2010 with Dr. Lafleur, cardiomyopathy with CHF with recent admission to [facility] for systolic heart failure and started on Entresto. Pt underwent a NST which which showed global hypokinesis of the remaining wall segments and small reversible defect of mild intensity of the inferoseptal wall consistent with per-infarct ischemia.  Patient endorses progressive dyspnea. Plan for left heart catheterization to assess progression of CAD,  and possible cause of cardiomyopathy. 65 year old male with h/o HTN, HLD, DM, CAD s/p cardiac cath 4/14/10 with confirmed LAD occlusion now s/p single-vessel CABG 4/2010 with Dr. Lafleur, cardiomyopathy with CHF with recent admission to [facility] for systolic heart failure and started on Entresto. Pt underwent a NST which which showed global hypokinesis of the remaining wall segments and small reversible defect of mild intensity of the inferoseptal wall consistent with per-infarct ischemia.  Patient endorses progressive dyspnea. Plan for left heart catheterization to assess progression of CAD,  and possible cause of cardiomyopathy.     Found with previous bypass LIMA to LAD patent  Now with 1 ELIZ to Circumflex  Found with moderate to severe MR plan for HUSEYIN 1/5/21 to evaluate severity 65 year old male with h/o HTN, HLD, DM, CAD s/p cardiac cath 4/14/10 with confirmed LAD occlusion now s/p single-vessel CABG 4/2010 with Dr. Lafleur, cardiomyopathy with CHF with recent admission to [facility] for systolic heart failure and started on Entresto. Pt underwent a NST which which showed global hypokinesis of the remaining wall segments and small reversible defect of mild intensity of the inferoseptal wall consistent with per-infarct ischemia.  Patient endorses progressive dyspnea. Plan for left heart catheterization to assess progression of CAD,  and possible cause of cardiomyopathy.     Found with previous bypass LIMA to LAD patent  Now with 1 ELIZ to Circumflex  Found with moderate to severe MR plan for HUSEYIN 1/5/21 to evaluate severity       Pt was noted to have Severe LV dysfunction w/ moderate-severe MR and had HUSEYIN done showing EF of 20-25%, Multiple left ventricular regional wall motion abnormalities exist, Severely reduced RV systolic function, Severely enlarged LA with SEC in LA and LAAA w/o thrombus, Moderate pleural effusion in the left lateral region, his chest Xray revealed pulmonary edema, cardiology requested for the admission for further diuresis.                Acute on chronic systolic CHF:   continue with Entresto, carvedilol  increased lasix to 80mg IV BID, monitoring electrolytes  cardiology following  will switch to 40mg PO BID on discharge  Lifevest on d/c as per cards    bilateral pleural effusion 2/2 CHF  mild improvement on CXR  CT surgery signed off  increased lasix IV, to go home on 40mg PO BID  will try weaning O2 tomorrow    CAD s/p CABG  -s/p cath this admission, one ELIZ to Circumflex  -continue with Aspirin 81mg and Brilinta,   -continue atorvastatin 40mg, lipid panel unremarkable    HTN  -continue carvedilol 12.5mg BID with holding parameters, will monitor BP closely.     HLD: continue with statin, lipid panel unremarkable    DM:  -A1c 10%  -continue home lantus and insulin            PHYSICAL EXAM:  GENERAL: NAD, well-groomed, well-developed  HEAD:  Atraumatic, Normocephalic  EYES: EOMI, PERRLA, conjunctiva and sclera clear  ENMT: Moist mucous membranes, Good dentition   NECK: Supple  NERVOUS SYSTEM:  Alert & Oriented X3, Good concentration; Bilateral LE mobile, sensation to light touch intact  CHEST/LUNG: Diminished breath sounds to auscultation bilaterally; No rales, rhonchi, wheezing, or rubs appreciated  HEART: Regular rate and rhythm; No murmurs, rubs, or gallops  ABDOMEN: Soft, Nontender, Nondistended; Bowel sounds present  EXTREMITIES:  2+ Peripheral Pulses, No clubbing or cyanosis  SKIN: No rashes or lesions

## 2021-01-04 NOTE — ASU PATIENT PROFILE, ADULT - PMH
Abnormal nuclear stress test    CAD (Coronary Artery Disease)  LAD occlusion  Depression    Diabetes    HTN (Hypertension)  controled with meds

## 2021-01-04 NOTE — DISCHARGE NOTE PROVIDER - NSDCCPTREATMENT_GEN_ALL_CORE_FT
PRINCIPAL PROCEDURE  Procedure: Left heart catheterization  Findings and Treatment:        PRINCIPAL PROCEDURE  Procedure: Left heart catheterization  Findings and Treatment: patient with 80% lesion to circumflex; now post left heart catheterization with 1 drug eluting stent  -Brilinta 180 mg x1 load today  -continue Brilinta 90 mg oral twice a day starting 1/5/21  -Aspirin 81 mg orally daily       PRINCIPAL PROCEDURE  Procedure: Left heart catheterization  Findings and Treatment: patient with 80% lesion to circumflex; now post left heart catheterization with 1 drug eluting stent  -Brilinta 180 mg x1 load today  -continue Brilinta 90 mg oral twice a day starting 1/5/21  -Aspirin 81 mg orally daily      SECONDARY PROCEDURE  Procedure: Transesophageal echocardiography (HUSEYIN)  Findings and Treatment: Found with moderat to severe mitral regurgitaiton on catheterization 1/4/21

## 2021-01-04 NOTE — PROGRESS NOTE ADULT - SUBJECTIVE AND OBJECTIVE BOX
Department of Cardiology                                                                  [facility]/[place] [address] [address] [facility]                                                            Telephone: [phone]. Fax:[phone]                                                                                      Cardiac Cath NP Note       Narrative:    65 year old male with h/o HTN, HLD, DM, CAD s/p cardiac cath 4/14/10 with confirmed LAD occlusion now s/p single-vessel CABG LIMA to LAD 4/2010 with Dr. Lafleur, cardiomyopathy with CHF with recent admission to [facility] for systolic heart failure and started on Entresto. Pt underwent a NST which which showed global hypokinesis of the remaining wall segments and small reversible defect of mild intensity of the inferoseptal wall consistent with per-infarct ischemia.         PAST MEDICAL & SURGICAL HISTORY:  Abnormal nuclear stress test    Depression    Diabetes    HTN (Hypertension)  controled with meds    CAD (Coronary Artery Disease)  LAD occlusion    S/P CABG (coronary artery bypass graft)    S/P Inguinal Hernia Repair  ishaan with mesh 2008      FAMILY HISTORY:    Home Medications:  aspirin 81 mg oral delayed release tablet: 1 tab(s) orally once a day (04 Jan 2021 14:55)  atorvastatin 40 mg oral tablet: 1 tab(s) orally once a day (04 Jan 2021 14:55)  carvedilol 12.5 mg oral tablet: 1 tab(s) orally 2 times a day (04 Jan 2021 14:55)  Entresto 49 mg-51 mg oral tablet: 1 tab(s) orally 2 times a day (04 Jan 2021 14:55)  furosemide 20 mg oral tablet: 1 tab(s) orally 2 times a day (04 Jan 2021 14:55)  gabapentin 100 mg oral capsule: 1 cap(s) orally 3 times a day (04 Jan 2021 14:55)  Lantus 100 units/mL subcutaneous solution: 20 unit(s) subcutaneous once a day (at bedtime) (04 Jan 2021 14:55)  Nitrostat 0.4 mg sublingual tablet: 1 tab(s) sublingual every 5 minutes (04 Jan 2021 14:55)  NovoLOG 100 units/mL subcutaneous solution: 15 unit(s) subcutaneous (04 Jan 2021 14:55)                       12.6   6.91  )-----------( 167      ( 04 Jan 2021 15:12 )             38.5     01-04    141  |  98  |  24.0<H>  ----------------------------<  127<H>  3.6   |  33.0<H>  |  1.01    Ca    8.9      04 Jan 2021 15:12      PT/INR - ( 04 Jan 2021 15:12 )   PT: 13.7 sec;   INR: 1.19 ratio    PTT - ( 04 Jan 2021 15:12 )  PTT:34.6 sec      HEALTH ISSUES - PROBLEM Dx:  Abnormal nuclear stress test    HPI:  65 year old male with h/o HTN, HLD, DM, CAD s/p cardiac cath 4/14/10 with confirmed LAD occlusion now s/p single-vessel CABG LIMA to LAD 4/2010 with Dr. Lafleur, cardiomyopathy with CHF with recent admission to [facility] for systolic heart failure and started on Entresto. Pt underwent a NST which which showed global hypokinesis of the remaining wall segments and small reversible defect of mild intensity of the inferoseptal wall consistent with per-infarct ischemia.  For [facility] to assess coronary anatomy and possible cause of cardiomyopathy.         Antianginal Therapies:        Beta Blockers:         Calcium Channel Blockers:        Long Acting Nitrates:        Ranexa:     Associated Risk Factors:        Cerebrovascular Disease: N/A       Chronic Lung Disease: N/A       Peripheral Arterial Disease: N/A       Chronic Kidney Disease (if yes, what is GFR): N/A       Uncontrolled Diabetes (if yes, what is HgbA1C or FBS): N/A       Poorly Controlled Hypertension (if yes, what is SBP): N/A       Morbid Obesity (if yes, what is BMI): N/A       History of Recent Ventricular Arrhythmia: N/A       Inability to Ambulate Safely: N/A       Need for Therapeutic Anticoagulation: N/A       Antiplatelet or Contrast Allergy: N/A (31 Dec 2020 10:20)    General: No fatigue, no fevers/chills  Respiratory: No dyspnea, no cough, no wheeze  CV: No chest pain, no palpitations  Abd: No nausea  Neuro: No headache, no dizziness  Toprol-XL (Faint; Other)      Objective:  Vital Signs Last 24 Hrs  T(C): 36.5 (04 Jan 2021 15:07), Max: 36.5 (04 Jan 2021 15:07)  T(F): 97.7 (04 Jan 2021 15:07), Max: 97.7 (04 Jan 2021 15:07)  HR: 74 (04 Jan 2021 17:00) (74 - 74)  BP: 128/81 (04 Jan 2021 17:00) (128/81 - 147/88)  BP(mean): --  RR: 16 (04 Jan 2021 17:00) (16 - 16)  SpO2: 97% (04 Jan 2021 17:00) (97% - 98%)    CM: SR  Neuro: A&OX3, CN 2-12 intact  HEENT: NC, AT  Lungs: CTA B/L  CV: S1, S2, no murmur, RRR  Abd: Soft  Right Groin: Soft, no bleeding, no hematoma  Extremity: + distal pulses  EKG:       DIAGNOSTICS:    Assessment of LVEF:       EF: 33%       Assessed by: NST       Date: 10/02/20      Noninvasive Testing:   Stress Test: Date: 10/2/20       Protocol: Reggadenoson        Symptoms: SOB, nausea       EKG Changes: none       Myocardial Imaging: mid to basal inferior and basal inferoseptal infarction with small area of mild inferoseptal evan-infarct ischemia.  Global hypokinesis.  EF 33%       Risk Assessment: Moderate    < from: Cardiac Cath Lab - Adult (01.04.21 @ 15:52) >  PROCEDURE:  --  Right heart catheterization.  --  Left heart catheterization with ventriculography.  --  Left coronary angiography.  --  Right coronary angiography.  --  Bypass graft angiography.  --  Sonosite.  --  Sheath Exchange for Intervention.  --  Interventional IVUS.  --  Hemostasis with Perclose-Intervention.  --  Intervention on proximal circumflex: drug-eluting stent.  TECHNIQUE: Cardiac catheterization performed electively.  Local anesthetic given. Right femoral artery access. Right femoral vein  access. Right heart catheterization. The procedure was performed utilizing  a catheter. Left heart catheterization. Ventriculography was performed.  Left coronary artery angiography. The vessel was injected utilizing a  catheter. Right coronary artery angiography. The vessel was injected  utilizing a catheter. Graftangiography. The vessel was injected utilizing  a catheter. Sonosite. RADIATION EXPOSURE: 10.4 min. A drug-eluting stent  was performed on the 80 % lesion in the proximal circumflex. Following  intervention there was a 1 % residual stenosis. There was no dissection.  Intravascular ultrasound was performed using a(Singly) EAGLE EYE SHORT TIP  catheter over a previously placed guidewire. Balloon angioplasty was  performed, using a EUPHORA 3.0MM X 20MM balloon, with 3 inflations and a  maximum inflation pressure of 16 kristi. During the procedure, the previous  guider was changed for a 6F EBU3.5 LAUNCHER guider, and a new BMW 190CM  WIRE wire was advanced across the lesion. A GHASSAN 3.50 X 38MM drug-eluting  stent was placed across the lesion and deployedat a maximum inflation  pressure of 12 kristi. During the procedure, the previous guider was changed  for a 6F EBU3.5 LAUNCHER guider, and a new BMW 190CM WIRE wire was  advanced across the lesion. Balloon angioplasty was performed, using a 3.5  X 15 NCEUPHORA BALLOON balloon, with 3 inflations and a maximum inflation  pressure of 20 kristi. Sheath Exchange for Intervention. Interventional IVUS.  Hemostasis with Perclose-Intervention.  CONTRAST GIVEN: Omnipaque 40 ml. Omnipaque 90 ml.  MEDICATIONS GIVEN: Heparin, 6000 units, IV. 1% Lidocaine, 10 ml,  subcutaneously. 1% Lidocaine, 10 ml, subcutaneously. 0.9NS, 50 ml, IV.  ticagrelor, 180 mg, PO.  VENTRICLES: EF 30% w/ inferior akinesis and global hypokinesis w/  moderate-severe MR.  CORONARY VESSELS: R dominant.  Moderate sized RCA w/ proximal occlusion and distal vessel fills via L  collaterals.  Mild LM disease.  Occluded LAD w/ large vessel filling from graft.  Large LCx w/ 80% proximal stenosis w/ large distal vessel w/ mild disease.  Grafts-1. LIMA to LAD-patent.  CX:   --  Proximal circumflex: There was a 80 % stenosis.  COMPLICATIONS: No complications occurred during the cath lab visit. No  complications occurred during the cath lab visit.  DIAGNOSTIC IMPRESSIONS: Severe pulmonary HTN.  ICS x 1 to LCx w/ 0% residual stenosis and CARLEEN III flow maintained pre and  post PCI.  IVUS of LCx.  Patent LIMA to LAD.  Severe LV dysfunction w/ moderate-severe MR.  DIAGNOSTIC RECOMMENDATIONS: Asa/brilinta.  Followup at [facility] in 1-2 weeks/?HUSEYIN to assess for mitraclip ( for fxnal  MR/as per COAPT trial ).  INTERVENTIONAL IMPRESSIONS: Severe pulmonary HTN.  ICS x 1 to LCx w/ 0% residual stenosis and CARLEEN III flow maintained pre and  post PCI.  IVUS of LCx.  Patent LIMA to LAD.  Severe LV dysfunction w/ moderate-severe MR.  INTERVENTIONAL RECOMMENDATIONS: Asa/brilinta.  Followup at [facility] in 1-2 weeks/?HUSEYIN to assess for mitraclip ( for fxnal  MR/as per COAPT trial ).  Prepared and signed by  Efren Hernandez MD  Signed 01/04/2021 17:09:37    < end of copied text >    ASSESSMENT AND PLAN:     65 year old male with h/o HTN, HLD, DM, CABG and CHF with abnormal stress test for [facility] to assess coronary anatomy and cause of EF 33%. Patient now s/p left heart catheterization with 1 ELIZ to circumflex and with moderate to severe MR. Patient is scheduled for HUSEYIN tomorrow 1/5/21 to evaluate severity of MR.        Coronary artery disease  -Admit to 3GUL/ ONU s/p C with ELIZ to Circumflex and moderate to severe MR   -Right groin precautions/ protocol  -HOB @ 30 degrees in 1 hour after RFV sheath pulled   -patient loaded with Brilinta 180 mg x1 in lab  -continue Aspirin 81 mg and Brilinta 90 mg twice a day starting tomorrow 1/5/21  -continue preprocedure medications/ resume metformin in 48 hours Thursday am  -Importance of DAPT reinforced with patient  -found to have severe LV dysfunction w/moderate - severe MR; severe pulmonary HTN   -Plan of care discussed with patient   -labs, EKG and procedure site assessed   -POCT glucose before meals with sliding scale coverage; Lantus half dose per patient home dose @ Bedtime  -Follow-up with Cardiology  attending MD (Dr Garcia)   within 1-2 weeks after discharge   -Discussed therapeutic lifestyle changes to reduce risk factors such as following a cardiac diet, weight loss, maintaining a healthy weight, exercise, smoking cessation, medication compliance, and regular follow-up  with MD to know your numbers (BP, cholesterol, weight, and glucose)  -cardiac rehab info provided/referral and communication to cardiac rehab completed                                                   Post Procedure Cardiac Cath NP Note       Narrative:    65 year old male with h/o HTN, HLD, DM, CAD s/p cardiac cath 4/14/10 with confirmed LAD occlusion now s/p single-vessel CABG 4/2010 with Dr. Lafleur, cardiomyopathy with CHF with recent admission to [facility] for systolic heart failure and started on Entresto. Pt underwent a NST which which showed global hypokinesis of the remaining wall segments and small reversible defect of mild intensity of the inferoseptal wall consistent with per-infarct ischemia.  Patient endorses progressive dyspnea. Plan for left heart catheterization to assess progression of CAD,  and possible cause of cardiomyopathy.       s/p LHC via right groin approach with Dr. Hernandez  Found with previous bypass LIMA to LAD patent  Now with 1 ELIZ to Circumflex  RFV sheath to be removed   RFA hemostasis achieved with Perclose device   Medications:  Heparin 6000 units;        PAST MEDICAL & SURGICAL HISTORY:  Abnormal nuclear stress test  Depression  Diabetes  HTN (Hypertension) controled with meds  CAD (Coronary Artery Disease) LAD occlusion  S/P CABG LIMA to LAD (coronary artery bypass graft)  S/P Inguinal Hernia Repair ishaan with mesh 2008      FAMILY HISTORY:    Home Medications:  aspirin 81 mg oral delayed release tablet: 1 tab(s) orally once a day (04 Jan 2021 14:55)  atorvastatin 40 mg oral tablet: 1 tab(s) orally once a day (04 Jan 2021 14:55)  carvedilol 12.5 mg oral tablet: 1 tab(s) orally 2 times a day (04 Jan 2021 14:55)  Entresto 49 mg-51 mg oral tablet: 1 tab(s) orally 2 times a day (04 Jan 2021 14:55)  furosemide 20 mg oral tablet: 1 tab(s) orally 2 times a day (04 Jan 2021 14:55)  gabapentin 100 mg oral capsule: 1 cap(s) orally 3 times a day (04 Jan 2021 14:55)  Lantus 100 units/mL subcutaneous solution: 20 unit(s) subcutaneous once a day (at bedtime) (04 Jan 2021 14:55)  Nitrostat 0.4 mg sublingual tablet: 1 tab(s) sublingual every 5 minutes (04 Jan 2021 14:55)  NovoLOG 100 units/mL subcutaneous solution: 15 unit(s) subcutaneous (04 Jan 2021 14:55)                       12.6   6.91  )-----------( 167      ( 04 Jan 2021 15:12 )             38.5     01-04    141  |  98  |  24.0<H>  ----------------------------<  127<H>  3.6   |  33.0<H>  |  1.01    Ca    8.9      04 Jan 2021 15:12      PT/INR - ( 04 Jan 2021 15:12 )   PT: 13.7 sec;   INR: 1.19 ratio    PTT - ( 04 Jan 2021 15:12 )  PTT:34.6 sec      HEALTH ISSUES - PROBLEM Dx:  Abnormal nuclear stress test    HPI:  65 year old male with h/o HTN, HLD, DM, CAD s/p cardiac cath 4/14/10 with confirmed LAD occlusion now s/p single-vessel CABG LIMA to LAD 4/2010 with Dr. Lafleur, cardiomyopathy with CHF with recent admission to [facility] for systolic heart failure and started on Entresto. Pt underwent a NST which which showed global hypokinesis of the remaining wall segments and small reversible defect of mild intensity of the inferoseptal wall consistent with per-infarct ischemia.  For [facility] to assess coronary anatomy and possible cause of cardiomyopathy.       General: No fatigue, no fevers/chills  Respiratory: No dyspnea, no cough, no wheeze  CV: No chest pain, no palpitations  Abd: No nausea  Neuro: No headache, no dizziness  Toprol-XL (Faint; Other)      Objective:  Vital Signs Last 24 Hrs  T(C): 36.5 (04 Jan 2021 15:07), Max: 36.5 (04 Jan 2021 15:07)  T(F): 97.7 (04 Jan 2021 15:07), Max: 97.7 (04 Jan 2021 15:07)  HR: 74 (04 Jan 2021 17:00) (74 - 74)  BP: 128/81 (04 Jan 2021 17:00) (128/81 - 147/88)  RR: 16 (04 Jan 2021 17:00) (16 - 16)  SpO2: 97% (04 Jan 2021 17:00) (97% - 98%)    CM: SR  Neuro: A&OX3, CN 2-12 intact  HEENT: NC, AT  Lungs: CTA B/L  CV: S1, S2, no murmur, RRR  Abd: Soft  Right Groin: Soft, no bleeding, no hematoma  Extremity: + distal pulses  EKG:    post EKG and tele with NSR       DIAGNOSTICS:    Assessment of LVEF:       EF: 33%       Assessed by: NST       Date: 10/02/20    Noninvasive Testing:   Stress Test: Date: 10/2/20       Protocol: Reggadenoson        Symptoms: SOB, nausea       EKG Changes: none       Myocardial Imaging: mid to basal inferior and basal inferoseptal infarction with small area of mild inferoseptal evan-infarct ischemia.  Global hypokinesis.  EF 33%       Risk Assessment: Moderate    < from: Cardiac Cath Lab - Adult (01.04.21 @ 15:52) >  PROCEDURE:  --  Right heart catheterization.  --  Left heart catheterization with ventriculography.  --  Left coronary angiography.  --  Right coronary angiography.  --  Bypass graft angiography.  --  Sonosite.  --  Sheath Exchange for Intervention.  --  Interventional IVUS.  --  Hemostasis with Perclose-Intervention.  --  Intervention on proximal circumflex: drug-eluting stent.  TECHNIQUE: Cardiac catheterization performed electively.  Local anesthetic given. Right femoral artery access. Right femoral vein  access. Right heart catheterization. The procedure was performed utilizing  a catheter. Left heart catheterization. Ventriculography was performed.  Left coronary artery angiography. The vessel was injected utilizing a  catheter. Right coronary artery angiography. The vessel was injected  utilizing a catheter. Graftangiography. The vessel was injected utilizing  a catheter. Sonosite. RADIATION EXPOSURE: 10.4 min. A drug-eluting stent  was performed on the 80 % lesion in the proximal circumflex. Following  intervention there was a 1 % residual stenosis. There was no dissection.  Intravascular ultrasound was performed using a(n) EAGLE EYE SHORT TIP  catheter over a previously placed guidewire. Balloon angioplasty was  performed, using a EUPHORA 3.0MM X 20MM balloon, with 3 inflations and a  maximum inflation pressure of 16 kristi. During the procedure, the previous  guider was changed for a 6F EBU3.5 LAUNCHER guider, and a new BMW 190CM  WIRE wire was advanced across the lesion. A GHASSAN 3.50 X 38MM drug-eluting  stent was placed across the lesion and deployedat a maximum inflation  pressure of 12 kristi. During the procedure, the previous guider was changed  for a 6F EBU3.5 LAUNCHER guider, and a new BMW 190CM WIRE wire was  advanced across the lesion. Balloon angioplasty was performed, using a 3.5  X 15 NCEUPHORA BALLOON balloon, with 3 inflations and a maximum inflation  pressure of 20 kristi. Sheath Exchange for Intervention. Interventional IVUS.  Hemostasis with Perclose-Intervention.  CONTRAST GIVEN: Omnipaque 40 ml. Omnipaque 90 ml.  MEDICATIONS GIVEN: Heparin, 6000 units, IV. 1% Lidocaine, 10 ml,  subcutaneously. 1% Lidocaine, 10 ml, subcutaneously. 0.9NS, 50 ml, IV.  ticagrelor, 180 mg, PO.  VENTRICLES: EF 30% w/ inferior akinesis and global hypokinesis w/  moderate-severe MR.  CORONARY VESSELS: R dominant.  Moderate sized RCA w/ proximal occlusion and distal vessel fills via L  collaterals.  Mild LM disease.  Occluded LAD w/ large vessel filling from graft.  Large LCx w/ 80% proximal stenosis w/ large distal vessel w/ mild disease.  Grafts-1. LIMA to LAD-patent.  CX:   --  Proximal circumflex: There was a 80 % stenosis.  COMPLICATIONS: No complications occurred during the cath lab visit. No  complications occurred during the cath lab visit.  DIAGNOSTIC IMPRESSIONS: Severe pulmonary HTN.  ICS x 1 to LCx w/ 0% residual stenosis and CARLEEN III flow maintained pre and  post PCI.  IVUS of LCx.  Patent LIMA to LAD.  Severe LV dysfunction w/ moderate-severe MR.  DIAGNOSTIC RECOMMENDATIONS: Asa/brilinta.  Followup at [facility] in 1-2 weeks/?HUSEYIN to assess for mitraclip ( for fxnal  MR/as per COAPT trial ).  INTERVENTIONAL IMPRESSIONS: Severe pulmonary HTN.  ICS x 1 to LCx w/ 0% residual stenosis and CARLEEN III flow maintained pre and  post PCI.  IVUS of LCx.  Patent LIMA to LAD.  Severe LV dysfunction w/ moderate-severe MR.  INTERVENTIONAL RECOMMENDATIONS: Asa/brilinta.  Followup at [facility] in 1-2 weeks/?HUSEYIN to assess for mitraclip ( for fxnal  MR/as per COAPT trial ).  Prepared and signed by  Efren Hernandez MD  Signed 01/04/2021 17:09:37    < end of copied text >    ASSESSMENT AND PLAN:     65 year old male with h/o HTN, HLD, DM, CABG and CHF with abnormal stress test for [facility] to assess coronary anatomy and cause of EF 33%. Patient now s/p left heart catheterization with 1 ELIZ to circumflex and with moderate to severe MR. Patient is scheduled for HUSEYIN tomorrow 1/5/21 to evaluate severity of MR.        -Admit to 3GUL/ ONU s/p C with ELIZ to Circumflex and moderate to severe MR   -Right groin precautions/ protocol  -HOB @ 30 degrees in 1 hour after RFV sheath pulled   -patient loaded with Brilinta 180 mg x1 in lab 1/4/21  -continue Aspirin 81 mg and Brilinta 90 mg twice a day starting tomorrow 1/5/21  -continue preprocedure medications   -Importance of DAPT (Brilinta and Aspirin) reinforced with patient  -Brilinta sent to patients pharmacy, will confirm it is covered at discharge   -found to have severe LV dysfunction w/moderate - severe MR; severe pulmonary HTN  -plan for HUSEYIN 1/5/21 to evaluate severity of MR, HUSEYIN ordered to be done by Dr. Garcia in am   -Plan of care discussed with patient   -POCT glucose before meals with sliding scale coverage; Lantus half dose per patient home dose @ Bedtime  -labs, EKG ordered for the morning   -Follow-up with Cardiology  attending MD (Dr Garcia)   within 1-2 weeks after discharge   -Discussed therapeutic lifestyle changes to reduce risk factors such as following a cardiac diet, weight loss, maintaining a healthy weight, exercise, smoking cessation, medication compliance, and regular follow-up  with MD to know your numbers (BP, cholesterol, weight, and glucose)  -cardiac rehab info provided/referral and communication to cardiac rehab completed                                                   Post Procedure Cardiac Cath NP Note       Narrative:    65 year old male with h/o HTN, HLD, DM, CAD s/p cardiac cath 4/14/10 with confirmed LAD occlusion now s/p single-vessel CABG LIMA to LAD 4/2010 with Dr. Lafleur, cardiomyopathy with CHF with recent admission to [facility] for systolic heart failure and started on Entresto. Pt underwent a NST which which showed global hypokinesis of the remaining wall segments and small reversible defect of mild intensity of the inferoseptal wall consistent with per-infarct ischemia.  Patient endorses progressive dyspnea. Plan for left heart catheterization to assess progression of CAD,  and possible cause of cardiomyopathy.       s/p LHC via right groin approach with Dr. Hernandez  Found with previous bypass LIMA to LAD patent  Now with 1 ELIZ to Circumflex  RFV sheath to be removed   RFA hemostasis achieved with Perclose device   Medications:  Heparin 6000 units;        PAST MEDICAL & SURGICAL HISTORY:  Abnormal nuclear stress test  Depression  Diabetes  HTN (Hypertension) controled with meds  CAD (Coronary Artery Disease) LAD occlusion  S/P CABG LIMA to LAD (coronary artery bypass graft)  S/P Inguinal Hernia Repair ishaan with mesh 2008      FAMILY HISTORY:    Home Medications:  aspirin 81 mg oral delayed release tablet: 1 tab(s) orally once a day (04 Jan 2021 14:55)  atorvastatin 40 mg oral tablet: 1 tab(s) orally once a day (04 Jan 2021 14:55)  carvedilol 12.5 mg oral tablet: 1 tab(s) orally 2 times a day (04 Jan 2021 14:55)  Entresto 49 mg-51 mg oral tablet: 1 tab(s) orally 2 times a day (04 Jan 2021 14:55)  furosemide 20 mg oral tablet: 1 tab(s) orally 2 times a day (04 Jan 2021 14:55)  gabapentin 100 mg oral capsule: 1 cap(s) orally 3 times a day (04 Jan 2021 14:55)  Lantus 100 units/mL subcutaneous solution: 20 unit(s) subcutaneous once a day (at bedtime) (04 Jan 2021 14:55)  Nitrostat 0.4 mg sublingual tablet: 1 tab(s) sublingual every 5 minutes (04 Jan 2021 14:55)  NovoLOG 100 units/mL subcutaneous solution: 15 unit(s) subcutaneous (04 Jan 2021 14:55)                       12.6   6.91  )-----------( 167      ( 04 Jan 2021 15:12 )             38.5     01-04    141  |  98  |  24.0<H>  ----------------------------<  127<H>  3.6   |  33.0<H>  |  1.01    Ca    8.9      04 Jan 2021 15:12      PT/INR - ( 04 Jan 2021 15:12 )   PT: 13.7 sec;   INR: 1.19 ratio    PTT - ( 04 Jan 2021 15:12 )  PTT:34.6 sec      HEALTH ISSUES - PROBLEM Dx:  Abnormal nuclear stress test    HPI:  65 year old male with h/o HTN, HLD, DM, CAD s/p cardiac cath 4/14/10 with confirmed LAD occlusion now s/p single-vessel CABG LIMA to LAD 4/2010 with Dr. Lafleur, cardiomyopathy with CHF with recent admission to [facility] for systolic heart failure and started on Entresto. Pt underwent a NST which which  showed global hypokinesis of the remaining wall segments and small reversible defect of mild intensity of the inferoseptal wall consistent with per-infarct ischemia.  For [facility] to assess coronary anatomy and possible cause of cardiomyopathy.       General: No fatigue, no fevers/chills  Respiratory: No dyspnea, no cough, no wheeze  CV: No chest pain, no palpitations  Abd: No nausea  Neuro: No headache, no dizziness  Toprol-XL (Faint; Other)      Objective:  Vital Signs Last 24 Hrs  T(C): 36.5 (04 Jan 2021 15:07), Max: 36.5 (04 Jan 2021 15:07)  T(F): 97.7 (04 Jan 2021 15:07), Max: 97.7 (04 Jan 2021 15:07)  HR: 74 (04 Jan 2021 17:00) (74 - 74)  BP: 128/81 (04 Jan 2021 17:00) (128/81 - 147/88)  RR: 16 (04 Jan 2021 17:00) (16 - 16)  SpO2: 97% (04 Jan 2021 17:00) (97% - 98%)    CM: SR  Neuro: A&OX3, CN 2-12 intact  HEENT: NC, AT  Lungs: CTA B/L  CV: S1, S2, no murmur, RRR  Abd: Soft  Right Groin: Soft, no bleeding, no hematoma  Extremity: + distal pulses  EKG:    post EKG and tele with NSR       DIAGNOSTICS:    Assessment of LVEF:       EF: 33%       Assessed by: NST       Date: 10/02/20    Noninvasive Testing:   Stress Test: Date: 10/2/20       Protocol: Regadenoson        Symptoms: SOB, nausea       EKG Changes: none       Myocardial Imaging: mid to basal inferior and basal inferoseptal infarction with small area of mild inferoseptal evan-infarct ischemia.  Global hypokinesis.  EF 33%       Risk Assessment: Moderate    < from: Cardiac Cath Lab - Adult (01.04.21 @ 15:52) >  PROCEDURE:  --  Right heart catheterization.  --  Left heart catheterization with ventriculography.  --  Left coronary angiography.  --  Right coronary angiography.  --  Bypass graft angiography.  --  Sonosite.  --  Sheath Exchange for Intervention.  --  Interventional IVUS.  --  Hemostasis with Perclose-Intervention.  --  Intervention on proximal circumflex: drug-eluting stent.  TECHNIQUE: Cardiac catheterization performed electively.  Local anesthetic given. Right femoral artery access. Right femoral vein  access. Right heart catheterization. The procedure was performed utilizing  a catheter. Left heart catheterization. Ventriculography was performed.  Left coronary artery angiography. The vessel was injected utilizing a  catheter. Right coronary artery angiography. The vessel was injected  utilizing a catheter. Graftangiography. The vessel was injected utilizing  a catheter. Sonosite. RADIATION EXPOSURE: 10.4 min. A drug-eluting stent  was performed on the 80 % lesion in the proximal circumflex. Following  intervention there was a 1 % residual stenosis. There was no dissection.  Intravascular ultrasound was performed using a(n) EAGLE EYE SHORT TIP  catheter over a previously placed guidewire. Balloon angioplasty was  performed, using a EUPHORA 3.0MM X 20MM balloon, with 3 inflations and a  maximum inflation pressure of 16 kristi. During the procedure, the previous  guider was changed for a 6F EBU3.5 LAUNCHER guider, and a new BMW 190CM  WIRE wire was advanced across the lesion. A GHASSAN 3.50 X 38MM drug-eluting  stent was placed across the lesion and deployedat a maximum inflation  pressure of 12 kristi. During the procedure, the previous guider was changed  for a 6F EBU3.5 LAUNCHER guider, and a new BMW 190CM WIRE wire was  advanced across the lesion. Balloon angioplasty was performed, using a 3.5  X 15 NCEUPHORA BALLOON balloon, with 3 inflations and a maximum inflation  pressure of 20 kristi. Sheath Exchange for Intervention. Interventional IVUS.  Hemostasis with Perclose-Intervention.  CONTRAST GIVEN: Omnipaque 40 ml. Omnipaque 90 ml.  MEDICATIONS GIVEN: Heparin, 6000 units, IV. 1% Lidocaine, 10 ml,  subcutaneously. 1% Lidocaine, 10 ml, subcutaneously. 0.9NS, 50 ml, IV.  ticagrelor, 180 mg, PO.  VENTRICLES: EF 30% w/ inferior akinesis and global hypokinesis w/  moderate-severe MR.  CORONARY VESSELS: R dominant.  Moderate sized RCA w/ proximal occlusion and distal vessel fills via L  collaterals.  Mild LM disease.  Occluded LAD w/ large vessel filling from graft.  Large LCx w/ 80% proximal stenosis w/ large distal vessel w/ mild disease.  Grafts-1. LIMA to LAD-patent.  CX:   --  Proximal circumflex: There was a 80 % stenosis.  COMPLICATIONS: No complications occurred during the cath lab visit. No  complications occurred during the cath lab visit.  DIAGNOSTIC IMPRESSIONS: Severe pulmonary HTN.  ICS x 1 to LCx w/ 0% residual stenosis and CARLENE III flow maintained pre and  post PCI.  IVUS of LCx.  Patent LIMA to LAD.  Severe LV dysfunction w/ moderate-severe MR.  DIAGNOSTIC RECOMMENDATIONS: Asa/brilinta.  Followup at [facility] in 1-2 weeks/?HUSEYIN to assess for mitraclip ( for fxnal  MR/as per COAPT trial ).  INTERVENTIONAL IMPRESSIONS: Severe pulmonary HTN.  ICS x 1 to LCx w/ 0% residual stenosis and CARLEEN III flow maintained pre and  post PCI.  IVUS of LCx.  Patent LIMA to LAD.  Severe LV dysfunction w/ moderate-severe MR.  INTERVENTIONAL RECOMMENDATIONS: Asa/brilinta.  Followup at [facility] in 1-2 weeks/?HUSEYIN to assess for mitraclip ( for fxnal  MR/as per COAPT trial ).  Prepared and signed by  Efren Hernandez MD  Signed 01/04/2021 17:09:37    < end of copied text >    ASSESSMENT AND PLAN:     65 year old male with h/o HTN, HLD, DM, CABG and CHF with abnormal stress test for LHC to assess coronary anatomy and cause of EF 33%. Patient now s/p left heart catheterization with 1 ELIZ to circumflex and with moderate to severe MR. Patient is scheduled for HUSEYIN tomorrow 1/5/21 to evaluate severity of MR.        -Admit to 3GUL/ ONU s/p [facility] with ELIZ to Circumflex and moderate to severe MR   -Right groin precautions/ protocol  -HOB @ 30 degrees in 1 hour after RFV sheath pulled   -patient loaded with Brilinta 180 mg x1 in lab 1/4/21  -continue Aspirin 81 mg and Brilinta 90 mg twice a day starting tomorrow 1/5/21  -continue preprocedure medications   -Importance of DAPT (Brilinta and Aspirin) reinforced with patient  -Brilinta sent to patients pharmacy, will confirm it is covered at discharge   -found to have severe LV dysfunction w/moderate - severe MR; severe pulmonary HTN  -plan for HUSEYIN 1/5/21 to evaluate severity of MR, HUSEYIN ordered to be done by Dr. Garcia in am   -Plan of care discussed with patient   -POCT glucose before meals with sliding scale coverage; Lantus half dose per patient home dose @ Bedtime  -labs, EKG ordered for the morning   -Follow-up with Cardiology  attending MD (Dr Garcia)   within 1-2 weeks after discharge   -Discussed therapeutic lifestyle changes to reduce risk factors such as following a cardiac diet, weight loss, maintaining a healthy weight, exercise, smoking cessation, medication compliance, and regular follow-up  with MD to know your numbers (BP, cholesterol, weight, and glucose)  -cardiac rehab info provided/referral and communication to cardiac rehab completed                                                   Post Procedure Cardiac Cath NP Note       Narrative:    65 year old male with h/o HTN, HLD, DM, CAD s/p cardiac cath 4/14/10 with confirmed LAD occlusion now s/p single-vessel CABG LIMA to LAD 4/2010 with Dr. Lafleur, cardiomyopathy with CHF with recent admission to [facility] for systolic heart failure and started on Entresto. Pt underwent a NST which which showed global hypokinesis of the remaining wall segments and small reversible defect of mild intensity of the inferoseptal wall consistent with per-infarct ischemia.  Patient endorses progressive dyspnea. Plan for left heart catheterization to assess progression of CAD,  and possible cause of cardiomyopathy.       s/p LHC via right groin approach with Dr. Hernandez  Found with previous bypass LIMA to LAD patent  Now with 1 ELIZ to Circumflex  RFV sheath to be removed   RFA hemostasis achieved with Perclose device   Medications:  Heparin 6000 units;        PAST MEDICAL & SURGICAL HISTORY:  Abnormal nuclear stress test  Depression  Diabetes  HTN (Hypertension) controled with meds  CAD (Coronary Artery Disease) LAD occlusion  S/P CABG LIMA to LAD (coronary artery bypass graft)  S/P Inguinal Hernia Repair ishaan with mesh 2008      FAMILY HISTORY:    Home Medications:  aspirin 81 mg oral delayed release tablet: 1 tab(s) orally once a day (04 Jan 2021 14:55)  atorvastatin 40 mg oral tablet: 1 tab(s) orally once a day (04 Jan 2021 14:55)  carvedilol 12.5 mg oral tablet: 1 tab(s) orally 2 times a day (04 Jan 2021 14:55)  Entresto 49 mg-51 mg oral tablet: 1 tab(s) orally 2 times a day (04 Jan 2021 14:55)  furosemide 20 mg oral tablet: 1 tab(s) orally 2 times a day (04 Jan 2021 14:55)  gabapentin 100 mg oral capsule: 1 cap(s) orally 3 times a day (04 Jan 2021 14:55)  Lantus 100 units/mL subcutaneous solution: 20 unit(s) subcutaneous once a day (at bedtime) (04 Jan 2021 14:55)  Nitrostat 0.4 mg sublingual tablet: 1 tab(s) sublingual every 5 minutes (04 Jan 2021 14:55)  NovoLOG 100 units/mL subcutaneous solution: 15 unit(s) subcutaneous (04 Jan 2021 14:55)                       12.6   6.91  )-----------( 167      ( 04 Jan 2021 15:12 )             38.5     01-04    141  |  98  |  24.0<H>  ----------------------------<  127<H>  3.6   |  33.0<H>  |  1.01    Ca    8.9      04 Jan 2021 15:12      PT/INR - ( 04 Jan 2021 15:12 )   PT: 13.7 sec;   INR: 1.19 ratio    PTT - ( 04 Jan 2021 15:12 )  PTT:34.6 sec      HEALTH ISSUES - PROBLEM Dx:  Abnormal nuclear stress test    HPI:  65 year old male with h/o HTN, HLD, DM, CAD s/p cardiac cath 4/14/10 with confirmed LAD occlusion now s/p single-vessel CABG LIMA to LAD 4/2010 with Dr. Lafleur, cardiomyopathy with CHF with recent admission to [facility] for systolic heart failure and started on Entresto. Pt underwent a NST which which  showed global hypokinesis of the remaining wall segments and small reversible defect of mild intensity of the inferoseptal wall consistent with per-infarct ischemia.  For [facility] to assess coronary anatomy and possible cause of cardiomyopathy.       General: No fatigue, no fevers/chills  Respiratory: No dyspnea, no cough, no wheeze  CV: No chest pain, no palpitations  Abd: No nausea  Neuro: No headache, no dizziness  Toprol-XL (Faint; Other)      Objective:  Vital Signs Last 24 Hrs  T(C): 36.5 (04 Jan 2021 15:07), Max: 36.5 (04 Jan 2021 15:07)  T(F): 97.7 (04 Jan 2021 15:07), Max: 97.7 (04 Jan 2021 15:07)  HR: 74 (04 Jan 2021 17:00) (74 - 74)  BP: 128/81 (04 Jan 2021 17:00) (128/81 - 147/88)  RR: 16 (04 Jan 2021 17:00) (16 - 16)  SpO2: 97% (04 Jan 2021 17:00) (97% - 98%)    CM: SR  Neuro: A&OX3, CN 2-12 intact  HEENT: NC, AT  Lungs: CTA B/L  CV: S1, S2, no murmur, RRR  Abd: Soft  Right Groin: Soft, no bleeding, no hematoma  Extremity: + distal pulses  EKG:    post EKG and tele with NSR       DIAGNOSTICS:    Assessment of LVEF:       EF: 33%       Assessed by: NST       Date: 10/02/20    Noninvasive Testing:   Stress Test: Date: 10/2/20       Protocol: Regadenoson        Symptoms: SOB, nausea       EKG Changes: none       Myocardial Imaging: mid to basal inferior and basal inferoseptal infarction with small area of mild inferoseptal evan-infarct ischemia.  Global hypokinesis.  EF 33%       Risk Assessment: Moderate    < from: Cardiac Cath Lab - Adult (01.04.21 @ 15:52) >  PROCEDURE:  --  Right heart catheterization.  --  Left heart catheterization with ventriculography.  --  Left coronary angiography.  --  Right coronary angiography.  --  Bypass graft angiography.  --  Sonosite.  --  Sheath Exchange for Intervention.  --  Interventional IVUS.  --  Hemostasis with Perclose-Intervention.  --  Intervention on proximal circumflex: drug-eluting stent.  TECHNIQUE: Cardiac catheterization performed electively.  Local anesthetic given. Right femoral artery access. Right femoral vein  access. Right heart catheterization. The procedure was performed utilizing  a catheter. Left heart catheterization. Ventriculography was performed.  Left coronary artery angiography. The vessel was injected utilizing a  catheter. Right coronary artery angiography. The vessel was injected  utilizing a catheter. Graftangiography. The vessel was injected utilizing  a catheter. Sonosite. RADIATION EXPOSURE: 10.4 min. A drug-eluting stent  was performed on the 80 % lesion in the proximal circumflex. Following  intervention there was a 1 % residual stenosis. There was no dissection.  Intravascular ultrasound was performed using a(n) EAGLE EYE SHORT TIP  catheter over a previously placed guidewire. Balloon angioplasty was  performed, using a EUPHORA 3.0MM X 20MM balloon, with 3 inflations and a  maximum inflation pressure of 16 kristi. During the procedure, the previous  guider was changed for a 6F EBU3.5 LAUNCHER guider, and a new BMW 190CM  WIRE wire was advanced across the lesion. A GHASSAN 3.50 X 38MM drug-eluting  stent was placed across the lesion and deployedat a maximum inflation  pressure of 12 kristi. During the procedure, the previous guider was changed  for a 6F EBU3.5 LAUNCHER guider, and a new BMW 190CM WIRE wire was  advanced across the lesion. Balloon angioplasty was performed, using a 3.5  X 15 NCEUPHORA BALLOON balloon, with 3 inflations and a maximum inflation  pressure of 20 kristi. Sheath Exchange for Intervention. Interventional IVUS.  Hemostasis with Perclose-Intervention.  CONTRAST GIVEN: Omnipaque 40 ml. Omnipaque 90 ml.  MEDICATIONS GIVEN: Heparin, 6000 units, IV. 1% Lidocaine, 10 ml,  subcutaneously. 1% Lidocaine, 10 ml, subcutaneously. 0.9NS, 50 ml, IV.  ticagrelor, 180 mg, PO.  VENTRICLES: EF 30% w/ inferior akinesis and global hypokinesis w/  moderate-severe MR.  CORONARY VESSELS: R dominant.  Moderate sized RCA w/ proximal occlusion and distal vessel fills via L  collaterals.  Mild LM disease.  Occluded LAD w/ large vessel filling from graft.  Large LCx w/ 80% proximal stenosis w/ large distal vessel w/ mild disease.  Grafts-1. LIMA to LAD-patent.  CX:   --  Proximal circumflex: There was a 80 % stenosis.  COMPLICATIONS: No complications occurred during the cath lab visit. No  complications occurred during the cath lab visit.  DIAGNOSTIC IMPRESSIONS: Severe pulmonary HTN.  ICS x 1 to LCx w/ 0% residual stenosis and CARLEEN III flow maintained pre and  post PCI.  IVUS of LCx.  Patent LIMA to LAD.  Severe LV dysfunction w/ moderate-severe MR.  DIAGNOSTIC RECOMMENDATIONS: Asa/brilinta.  Followup at [facility] in 1-2 weeks/?HUSEYIN to assess for mitraclip ( for fxnal  MR/as per COAPT trial ).  INTERVENTIONAL IMPRESSIONS: Severe pulmonary HTN.  ICS x 1 to LCx w/ 0% residual stenosis and CARLEEN III flow maintained pre and  post PCI.  IVUS of LCx.  Patent LIMA to LAD.  Severe LV dysfunction w/ moderate-severe MR.  INTERVENTIONAL RECOMMENDATIONS: Asa/brilinta.  Followup at [facility] in 1-2 weeks/?HUSEYIN to assess for mitraclip ( for fxnal  MR/as per COAPT trial ).  Prepared and signed by  Efren Hernandez MD  Signed 01/04/2021 17:09:37    < end of copied text >    ASSESSMENT AND PLAN:     65 year old male with h/o HTN, HLD, DM, CABG and CHF with abnormal stress test for LHC to assess coronary anatomy and cause of EF 33%. Patient now s/p left heart catheterization with 1 ELIZ to circumflex and with moderate to severe MR. Patient is scheduled for HUSEYIN tomorrow 1/5/21 to evaluate severity of MR.        -Admit to 3GUL/ ONU s/p [facility] with ELIZ to Circumflex and moderate to severe MR   -Right groin precautions/ protocol  -HOB @ 30 degrees in 1 hour after RFV sheath pulled   -patient loaded with Brilinta 180 mg x1 in lab 1/4/21  -continue Aspirin 81 mg and Brilinta 90 mg twice a day starting tomorrow 1/5/21  -continue preprocedure medications   -Importance of DAPT (Brilinta and Aspirin) reinforced with patient  -Brilinta sent to patients pharmacy, will confirm it is covered at discharge   -found to have severe LV dysfunction w/moderate - severe MR; severe pulmonary HTN  -plan for HUSEYIN 1/5/21 to evaluate severity of MR, mitral clip work-up /coapt trial, HUSEYIN ordered to be done by Dr. Garcia in am   -Plan of care discussed with patient   -POCT glucose before meals with sliding scale coverage; Lantus half dose per patient home dose @ Bedtime  -labs, EKG ordered for the morning   -Follow-up with Cardiology  attending MD (Dr Garcia)   within 1-2 weeks after discharge   -Discussed therapeutic lifestyle changes to reduce risk factors such as following a cardiac diet, weight loss, maintaining a healthy weight, exercise, smoking cessation, medication compliance, and regular follow-up  with MD to know your numbers (BP, cholesterol, weight, and glucose)  -cardiac rehab info provided/referral and communication to cardiac rehab completed                                                   Post Procedure Cardiac Cath NP Note       Narrative:    65 year old male with h/o HTN, HLD, DM, CAD s/p cardiac cath 4/14/10 with confirmed LAD occlusion now s/p single-vessel CABG LIMA to LAD 4/2010 with Dr. Lafleur, cardiomyopathy with CHF with recent admission to [facility] for systolic heart failure and started on Entresto. Pt underwent a NST which which showed global hypokinesis of the remaining wall segments and small reversible defect of mild intensity of the inferoseptal wall consistent with per-infarct ischemia.  Patient endorses progressive dyspnea. Plan for left heart catheterization to assess progression of CAD,  and possible cause of cardiomyopathy.       s/p LHC via right groin approach with Dr. Hernandez  Found with previous bypass LIMA to LAD patent  Now with 1 ELIZ to Circumflex  RFV sheath to be removed   RFA hemostasis achieved with Perclose device   Medications:  Heparin 6000 units;        PAST MEDICAL & SURGICAL HISTORY:  Abnormal nuclear stress test  Depression  Diabetes  HTN (Hypertension) controled with meds  CAD (Coronary Artery Disease) LAD occlusion  S/P CABG LIMA to LAD (coronary artery bypass graft)  S/P Inguinal Hernia Repair ishaan with mesh 2008      FAMILY HISTORY:    Home Medications:  aspirin 81 mg oral delayed release tablet: 1 tab(s) orally once a day (04 Jan 2021 14:55)  atorvastatin 40 mg oral tablet: 1 tab(s) orally once a day (04 Jan 2021 14:55)  carvedilol 12.5 mg oral tablet: 1 tab(s) orally 2 times a day (04 Jan 2021 14:55)  Entresto 49 mg-51 mg oral tablet: 1 tab(s) orally 2 times a day (04 Jan 2021 14:55)  furosemide 20 mg oral tablet: 1 tab(s) orally 2 times a day (04 Jan 2021 14:55)  gabapentin 100 mg oral capsule: 1 cap(s) orally 3 times a day (04 Jan 2021 14:55)  Lantus 100 units/mL subcutaneous solution: 20 unit(s) subcutaneous once a day (at bedtime) (04 Jan 2021 14:55)  Nitrostat 0.4 mg sublingual tablet: 1 tab(s) sublingual every 5 minutes (04 Jan 2021 14:55)  NovoLOG 100 units/mL subcutaneous solution: 15 unit(s) subcutaneous (04 Jan 2021 14:55)                       12.6   6.91  )-----------( 167      ( 04 Jan 2021 15:12 )             38.5     01-04    141  |  98  |  24.0<H>  ----------------------------<  127<H>  3.6   |  33.0<H>  |  1.01    Ca    8.9      04 Jan 2021 15:12      PT/INR - ( 04 Jan 2021 15:12 )   PT: 13.7 sec;   INR: 1.19 ratio    PTT - ( 04 Jan 2021 15:12 )  PTT:34.6 sec      HEALTH ISSUES - PROBLEM Dx:  Abnormal nuclear stress test    HPI:  65 year old male with h/o HTN, HLD, DM, CAD s/p cardiac cath 4/14/10 with confirmed LAD occlusion now s/p single-vessel CABG LIMA to LAD 4/2010 with Dr. Lafleur, cardiomyopathy with CHF with recent admission to [facility] for systolic heart failure and started on Entresto. Pt underwent a NST which which  showed global hypokinesis of the remaining wall segments and small reversible defect of mild intensity of the inferoseptal wall consistent with per-infarct ischemia.  For [facility] to assess coronary anatomy and possible cause of cardiomyopathy.       General: No fatigue, no fevers/chills  Respiratory: No dyspnea, no cough, no wheeze  CV: No chest pain, no palpitations  Abd: No nausea  Neuro: No headache, no dizziness  Toprol-XL (Faint; Other)      Objective:  Vital Signs Last 24 Hrs  T(C): 36.5 (04 Jan 2021 15:07), Max: 36.5 (04 Jan 2021 15:07)  T(F): 97.7 (04 Jan 2021 15:07), Max: 97.7 (04 Jan 2021 15:07)  HR: 74 (04 Jan 2021 17:00) (74 - 74)  BP: 128/81 (04 Jan 2021 17:00) (128/81 - 147/88)  RR: 16 (04 Jan 2021 17:00) (16 - 16)  SpO2: 97% (04 Jan 2021 17:00) (97% - 98%)    CM: SR  Neuro: A&OX3, CN 2-12 intact  HEENT: NC, AT  Lungs: CTA B/L  CV: S1, S2, no murmur, RRR  Abd: Soft  Right Groin: Soft, no bleeding, no hematoma  Extremity: + distal pulses  EKG:    post EKG and tele with NSR       DIAGNOSTICS:    Assessment of LVEF:       EF: 33%       Assessed by: NST       Date: 10/02/20    Noninvasive Testing:   Stress Test: Date: 10/2/20       Protocol: Regadenoson        Symptoms: SOB, nausea       EKG Changes: none       Myocardial Imaging: mid to basal inferior and basal inferoseptal infarction with small area of mild inferoseptal evan-infarct ischemia.  Global hypokinesis.  EF 33%       Risk Assessment: Moderate    < from: Cardiac Cath Lab - Adult (01.04.21 @ 15:52) >  PROCEDURE:  --  Right heart catheterization.  --  Left heart catheterization with ventriculography.  --  Left coronary angiography.  --  Right coronary angiography.  --  Bypass graft angiography.  --  Sonosite.  --  Sheath Exchange for Intervention.  --  Interventional IVUS.  --  Hemostasis with Perclose-Intervention.  --  Intervention on proximal circumflex: drug-eluting stent.  TECHNIQUE: Cardiac catheterization performed electively.  Local anesthetic given. Right femoral artery access. Right femoral vein  access. Right heart catheterization. The procedure was performed utilizing  a catheter. Left heart catheterization. Ventriculography was performed.  Left coronary artery angiography. The vessel was injected utilizing a  catheter. Right coronary artery angiography. The vessel was injected  utilizing a catheter. Graftangiography. The vessel was injected utilizing  a catheter. Sonosite. RADIATION EXPOSURE: 10.4 min. A drug-eluting stent  was performed on the 80 % lesion in the proximal circumflex. Following  intervention there was a 1 % residual stenosis. There was no dissection.  Intravascular ultrasound was performed using a(n) EAGLE EYE SHORT TIP  catheter over a previously placed guidewire. Balloon angioplasty was  performed, using a EUPHORA 3.0MM X 20MM balloon, with 3 inflations and a  maximum inflation pressure of 16 kristi. During the procedure, the previous  guider was changed for a 6F EBU3.5 LAUNCHER guider, and a new BMW 190CM  WIRE wire was advanced across the lesion. A GHASSAN 3.50 X 38MM drug-eluting  stent was placed across the lesion and deployedat a maximum inflation  pressure of 12 kristi. During the procedure, the previous guider was changed  for a 6F EBU3.5 LAUNCHER guider, and a new BMW 190CM WIRE wire was  advanced across the lesion. Balloon angioplasty was performed, using a 3.5  X 15 NCEUPHORA BALLOON balloon, with 3 inflations and a maximum inflation  pressure of 20 kristi. Sheath Exchange for Intervention. Interventional IVUS.  Hemostasis with Perclose-Intervention.  CONTRAST GIVEN: Omnipaque 40 ml. Omnipaque 90 ml.  MEDICATIONS GIVEN: Heparin, 6000 units, IV. 1% Lidocaine, 10 ml,  subcutaneously. 1% Lidocaine, 10 ml, subcutaneously. 0.9NS, 50 ml, IV.  ticagrelor, 180 mg, PO.  VENTRICLES: EF 30% w/ inferior akinesis and global hypokinesis w/  moderate-severe MR.  CORONARY VESSELS: R dominant.  Moderate sized RCA w/ proximal occlusion and distal vessel fills via L  collaterals.  Mild LM disease.  Occluded LAD w/ large vessel filling from graft.  Large LCx w/ 80% proximal stenosis w/ large distal vessel w/ mild disease.  Grafts-1. LIMA to LAD-patent.  CX:   --  Proximal circumflex: There was a 80 % stenosis.  COMPLICATIONS: No complications occurred during the cath lab visit. No  complications occurred during the cath lab visit.  DIAGNOSTIC IMPRESSIONS: Severe pulmonary HTN.  ICS x 1 to LCx w/ 0% residual stenosis and CARLEEN III flow maintained pre and  post PCI.  IVUS of LCx.  Patent LIMA to LAD.  Severe LV dysfunction w/ moderate-severe MR.  DIAGNOSTIC RECOMMENDATIONS: Asa/brilinta.  Followup at [facility] in 1-2 weeks/?HUSEYIN to assess for mitraclip ( for fxnal  MR/as per COAPT trial ).  INTERVENTIONAL IMPRESSIONS: Severe pulmonary HTN.  ICS x 1 to LCx w/ 0% residual stenosis and CARLEEN III flow maintained pre and  post PCI.  IVUS of LCx.  Patent LIMA to LAD.  Severe LV dysfunction w/ moderate-severe MR.  INTERVENTIONAL RECOMMENDATIONS: Asa/brilinta.  Followup at [facility] in 1-2 weeks/?HUSEYIN to assess for mitraclip ( for fxnal  MR/as per COAPT trial ).  Prepared and signed by  Efren Hernandez MD  Signed 01/04/2021 17:09:37    < end of copied text >    ASSESSMENT AND PLAN:     65 year old male with h/o HTN, HLD, DM, CABG and CHF with abnormal stress test for LHC to assess coronary anatomy and cause of EF 33%. Patient now s/p left heart catheterization with 1 ELIZ to circumflex and with moderate to severe MR. Patient is scheduled for HUSEYIN tomorrow 1/5/21 to evaluate severity of MR.        -Admit to 3G[facility]/ ONU s/p [facility] with ELIZ to Left Circumflex and moderate to severe MR   -Right groin precautions/ protocol  -HOB @ 30 degrees in 1 hour after RFV sheath pulled   -patient loaded with Brilinta 180 mg x1 in lab 1/4/21  -continue Aspirin 81 mg and Brilinta 90 mg twice a day starting tomorrow 1/5/21 - (rx sent to pharmacy; Brilinta is covered)  -Importance of DAPT (Brilinta and Aspirin) reinforced with patient  -continue preprocedure medications   -found to have severe LV dysfunction w/moderate - severe MR; severe pulmonary HTN  -plan for HUSEYIN 1/5/21 to evaluate severity of MR, mitral clip work-up /coapt trial, HUSEYIN ordered to be done by Dr. Garcia in am   -Plan of care discussed with patient   -POCT glucose before meals with sliding scale coverage; Lantus half dose per patient home dose @ Bedtime  -labs, EKG ordered for the morning   -Follow-up with Cardiology  attending MD (Dr Garcia)   within 1-2 weeks after discharge   -Discussed therapeutic lifestyle changes to reduce risk factors such as following a cardiac diet, weight loss, maintaining a healthy weight, exercise, smoking cessation, medication compliance, and regular follow-up  with MD to know your numbers (BP, cholesterol, weight, and glucose)  -cardiac rehab info provided/referral and communication to cardiac rehab completed

## 2021-01-04 NOTE — DISCHARGE NOTE PROVIDER - NSDCCPCAREPLAN_GEN_ALL_CORE_FT
PRINCIPAL DISCHARGE DIAGNOSIS  Diagnosis: Dyspnea  Assessment and Plan of Treatment: patient with history of cad, cabg with progressive dyspnea      SECONDARY DISCHARGE DIAGNOSES  Diagnosis: Abnormal stress test  Assessment and Plan of Treatment: mid to basal inferior and basal inferoseptal infarction with small area of mild inferoseptal evan-infarct ischemia.  Global hypokinesis.  EF 33%

## 2021-01-04 NOTE — DISCHARGE NOTE PROVIDER - NSDCFUADDINST_GEN_ALL_CORE_FT
No heavy lifting, driving, sex, tub baths, swimming, or any activity that submerges the lower half of the body in water for 48 hours.  Limited walking and stairs for 48 hours.    Change the bandaid after 24 hours and every 24 hours after that.  Keep the puncture site dry and covered with a bandaid until a scab forms.    Observe the site frequently.  If bleeding or a large lump (the size of a golf ball or bigger) occurs lie flat, apply continuous direct pressure just above the puncture site for at least 10 minutes, and notify your physician immediately.  If the bleeding cannot be controlled, call 911 immediately for assistance.  Notify your physician of pain, swelling or any drainage.    Notify your physician immediately if coldness, numbness, discoloration or pain in your foot occurs.

## 2021-01-05 DIAGNOSIS — I50.43 ACUTE ON CHRONIC COMBINED SYSTOLIC (CONGESTIVE) AND DIASTOLIC (CONGESTIVE) HEART FAILURE: ICD-10-CM

## 2021-01-05 DIAGNOSIS — I25.118 ATHEROSCLEROTIC HEART DISEASE OF NATIVE CORONARY ARTERY WITH OTHER FORMS OF ANGINA PECTORIS: ICD-10-CM

## 2021-01-05 DIAGNOSIS — J90 PLEURAL EFFUSION, NOT ELSEWHERE CLASSIFIED: ICD-10-CM

## 2021-01-05 LAB
A1C WITH ESTIMATED AVERAGE GLUCOSE RESULT: 10.8 % — HIGH (ref 4–5.6)
ANION GAP SERPL CALC-SCNC: 12 MMOL/L — SIGNIFICANT CHANGE UP (ref 5–17)
BASOPHILS # BLD AUTO: 0.05 K/UL — SIGNIFICANT CHANGE UP (ref 0–0.2)
BASOPHILS NFR BLD AUTO: 0.8 % — SIGNIFICANT CHANGE UP (ref 0–2)
BUN SERPL-MCNC: 25 MG/DL — HIGH (ref 8–20)
CALCIUM SERPL-MCNC: 8.6 MG/DL — SIGNIFICANT CHANGE UP (ref 8.6–10.2)
CHLORIDE SERPL-SCNC: 98 MMOL/L — SIGNIFICANT CHANGE UP (ref 98–107)
CO2 SERPL-SCNC: 29 MMOL/L — SIGNIFICANT CHANGE UP (ref 22–29)
CREAT SERPL-MCNC: 1.08 MG/DL — SIGNIFICANT CHANGE UP (ref 0.5–1.3)
EOSINOPHIL # BLD AUTO: 0.08 K/UL — SIGNIFICANT CHANGE UP (ref 0–0.5)
EOSINOPHIL NFR BLD AUTO: 1.3 % — SIGNIFICANT CHANGE UP (ref 0–6)
ESTIMATED AVERAGE GLUCOSE: 263 MG/DL — HIGH (ref 68–114)
GLUCOSE BLDC GLUCOMTR-MCNC: 142 MG/DL — HIGH (ref 70–99)
GLUCOSE BLDC GLUCOMTR-MCNC: 151 MG/DL — HIGH (ref 70–99)
GLUCOSE BLDC GLUCOMTR-MCNC: 195 MG/DL — HIGH (ref 70–99)
GLUCOSE BLDC GLUCOMTR-MCNC: 226 MG/DL — HIGH (ref 70–99)
GLUCOSE BLDC GLUCOMTR-MCNC: 230 MG/DL — HIGH (ref 70–99)
GLUCOSE SERPL-MCNC: 147 MG/DL — HIGH (ref 70–99)
HCT VFR BLD CALC: 39.3 % — SIGNIFICANT CHANGE UP (ref 39–50)
HGB BLD-MCNC: 12.7 G/DL — LOW (ref 13–17)
IMM GRANULOCYTES NFR BLD AUTO: 0.2 % — SIGNIFICANT CHANGE UP (ref 0–1.5)
LYMPHOCYTES # BLD AUTO: 1.68 K/UL — SIGNIFICANT CHANGE UP (ref 1–3.3)
LYMPHOCYTES # BLD AUTO: 27.8 % — SIGNIFICANT CHANGE UP (ref 13–44)
MCHC RBC-ENTMCNC: 28 PG — SIGNIFICANT CHANGE UP (ref 27–34)
MCHC RBC-ENTMCNC: 32.3 GM/DL — SIGNIFICANT CHANGE UP (ref 32–36)
MCV RBC AUTO: 86.8 FL — SIGNIFICANT CHANGE UP (ref 80–100)
MONOCYTES # BLD AUTO: 0.51 K/UL — SIGNIFICANT CHANGE UP (ref 0–0.9)
MONOCYTES NFR BLD AUTO: 8.4 % — SIGNIFICANT CHANGE UP (ref 2–14)
NEUTROPHILS # BLD AUTO: 3.72 K/UL — SIGNIFICANT CHANGE UP (ref 1.8–7.4)
NEUTROPHILS NFR BLD AUTO: 61.5 % — SIGNIFICANT CHANGE UP (ref 43–77)
NT-PROBNP SERPL-SCNC: HIGH PG/ML (ref 0–300)
PLATELET # BLD AUTO: 168 K/UL — SIGNIFICANT CHANGE UP (ref 150–400)
POTASSIUM SERPL-MCNC: 4 MMOL/L — SIGNIFICANT CHANGE UP (ref 3.5–5.3)
POTASSIUM SERPL-SCNC: 4 MMOL/L — SIGNIFICANT CHANGE UP (ref 3.5–5.3)
RBC # BLD: 4.53 M/UL — SIGNIFICANT CHANGE UP (ref 4.2–5.8)
RBC # FLD: 15.1 % — HIGH (ref 10.3–14.5)
SODIUM SERPL-SCNC: 139 MMOL/L — SIGNIFICANT CHANGE UP (ref 135–145)
WBC # BLD: 6.05 K/UL — SIGNIFICANT CHANGE UP (ref 3.8–10.5)
WBC # FLD AUTO: 6.05 K/UL — SIGNIFICANT CHANGE UP (ref 3.8–10.5)

## 2021-01-05 PROCEDURE — 99222 1ST HOSP IP/OBS MODERATE 55: CPT

## 2021-01-05 PROCEDURE — 71045 X-RAY EXAM CHEST 1 VIEW: CPT | Mod: 26

## 2021-01-05 PROCEDURE — 99223 1ST HOSP IP/OBS HIGH 75: CPT

## 2021-01-05 RX ORDER — FUROSEMIDE 40 MG
40 TABLET ORAL
Refills: 0 | Status: DISCONTINUED | OUTPATIENT
Start: 2021-01-05 | End: 2021-01-05

## 2021-01-05 RX ORDER — FUROSEMIDE 40 MG
40 TABLET ORAL EVERY 12 HOURS
Refills: 0 | Status: DISCONTINUED | OUTPATIENT
Start: 2021-01-05 | End: 2021-01-06

## 2021-01-05 RX ORDER — HEPARIN SODIUM 5000 [USP'U]/ML
5000 INJECTION INTRAVENOUS; SUBCUTANEOUS EVERY 12 HOURS
Refills: 0 | Status: DISCONTINUED | OUTPATIENT
Start: 2021-01-05 | End: 2021-01-08

## 2021-01-05 RX ORDER — FUROSEMIDE 40 MG
40 TABLET ORAL ONCE
Refills: 0 | Status: COMPLETED | OUTPATIENT
Start: 2021-01-05 | End: 2021-01-05

## 2021-01-05 RX ORDER — ACETAMINOPHEN 500 MG
650 TABLET ORAL EVERY 8 HOURS
Refills: 0 | Status: DISCONTINUED | OUTPATIENT
Start: 2021-01-05 | End: 2021-01-08

## 2021-01-05 RX ADMIN — CARVEDILOL PHOSPHATE 12.5 MILLIGRAM(S): 80 CAPSULE, EXTENDED RELEASE ORAL at 06:41

## 2021-01-05 RX ADMIN — Medication 40 MILLIGRAM(S): at 17:27

## 2021-01-05 RX ADMIN — TICAGRELOR 90 MILLIGRAM(S): 90 TABLET ORAL at 06:41

## 2021-01-05 RX ADMIN — Medication 81 MILLIGRAM(S): at 11:30

## 2021-01-05 RX ADMIN — Medication 2: at 17:05

## 2021-01-05 RX ADMIN — Medication 650 MILLIGRAM(S): at 23:26

## 2021-01-05 RX ADMIN — HEPARIN SODIUM 5000 UNIT(S): 5000 INJECTION INTRAVENOUS; SUBCUTANEOUS at 17:27

## 2021-01-05 RX ADMIN — GABAPENTIN 100 MILLIGRAM(S): 400 CAPSULE ORAL at 23:30

## 2021-01-05 RX ADMIN — SACUBITRIL AND VALSARTAN 1 TABLET(S): 24; 26 TABLET, FILM COATED ORAL at 06:40

## 2021-01-05 RX ADMIN — ATORVASTATIN CALCIUM 40 MILLIGRAM(S): 80 TABLET, FILM COATED ORAL at 23:30

## 2021-01-05 RX ADMIN — GABAPENTIN 100 MILLIGRAM(S): 400 CAPSULE ORAL at 14:34

## 2021-01-05 RX ADMIN — GABAPENTIN 100 MILLIGRAM(S): 400 CAPSULE ORAL at 06:41

## 2021-01-05 RX ADMIN — SACUBITRIL AND VALSARTAN 1 TABLET(S): 24; 26 TABLET, FILM COATED ORAL at 17:27

## 2021-01-05 RX ADMIN — TICAGRELOR 90 MILLIGRAM(S): 90 TABLET ORAL at 17:26

## 2021-01-05 RX ADMIN — INSULIN GLARGINE 10 UNIT(S): 100 INJECTION, SOLUTION SUBCUTANEOUS at 23:25

## 2021-01-05 RX ADMIN — CARVEDILOL PHOSPHATE 12.5 MILLIGRAM(S): 80 CAPSULE, EXTENDED RELEASE ORAL at 17:26

## 2021-01-05 RX ADMIN — Medication 40 MILLIGRAM(S): at 11:30

## 2021-01-05 RX ADMIN — Medication 2: at 11:30

## 2021-01-05 NOTE — PROGRESS NOTE ADULT - ASSESSMENT
65 year old male with h/o HTN, HL, DM, CAD s/p cardiac cath 4/14/10 with confirmed LAD occlusion now s/p single-vessel CABG LIMA to LAD 4/2010 with Dr. Lafleur, cardiomyopathy with CHF with recent admission to [facility] for systolic heart failure and started on Entresto. Pt underwent a NST revealed  global hypokinesis of the remaining wall segments and small reversible defect of mild intensity of the inferoseptal wall consistent with per-infarct ischemia.  Patient endorses progressive dyspnea.   s/p LHC via right groin approach  previous bypass LIMA to LAD patent  s/p One ELIZ to Circumflex  done RFV/RFA hemostasis achieved with Perclose device   Severe LV dysfunction w/ moderate-severe MR.      Admit to Hospital due to comorbidities   NPO for HUSEYIN this am for further evaluation of Mitral valve disease  for possible future Adri- clip   Post procedure orders and observations   DAPT: ASA and Brilinta   Statin therapy   Beta blocker therapy    Entresto 51/49mg po BID   Groin  precautions maintained   Ambulate if stable   Cardiac rehab information provided / referral and communication to cardiac rehab completed   Follow up with cardiologist         65 year old male with h/o HTN, HL, DM, CAD s/p cardiac cath 4/14/10 with confirmed LAD occlusion now s/p single-vessel CABG LIMA to LAD 4/2010 with Dr. Lafleur, cardiomyopathy with CHF with recent admission to [facility] for systolic heart failure and started on Entresto. Pt underwent a NST revealed  global hypokinesis of the remaining wall segments and small reversible defect of mild intensity of the inferoseptal wall consistent with per-infarct ischemia.  Patient endorses progressive dyspnea.   s/p LHC via right groin approach  previous bypass LIMA to LAD patent  s/p One ELIZ to Circumflex  done RFV/RFA hemostasis achieved with Perclose device   Severe LV dysfunction w/ moderate-severe MR.    Admit to Hospital due to comorbidities   NPO for HUSEYIN this am for further evaluation of Mitral valve disease  for possible future Adri- clip   Post procedure orders and observations   DAPT: ASA and Brilinta   Statin therapy   Beta blocker therapy    Entresto 51/49mg po BID   Lasix daily increased to 40 mg   Groin  precautions maintained   Ambulate if stable   Cardiac rehab information provided / referral and communication to cardiac rehab completed   Follow up with cardiologist         65 year old male with h/o HTN, HL, DM, CAD s/p cardiac cath 4/14/10 with confirmed LAD occlusion now s/p single-vessel CABG LIMA to LAD 4/2010 with Dr. Lafleur, cardiomyopathy with CHF with recent admission to [facility] for systolic heart failure and started on Entresto. Pt underwent a NST revealed  global hypokinesis of the remaining wall segments and small reversible defect of mild intensity of the inferoseptal wall consistent with per-infarct ischemia.  Patient endorses progressive dyspnea.   s/p LHC via right groin approach  previous bypass LIMA to LAD patent  s/p One ELIZ to Circumflex  done RFV/RFA hemostasis achieved with Perclose device   Severe LV dysfunction w/ moderate-severe MR.Pt had a HUSEYIN today and found to have moderate MR but  chest Xray revealed pulmonary edema and bilateral pleural effusion with a ProBNP 11,618 and o2 sat declined to 88- 91% . Pt denies SOB but clinical signs of volume overload are evident     Reviewed with Dr Garcia   Admit to Hospital due to comorbidities  and evidence of pulmonary edema on xray   NPO for HUSEYIN this am for further evaluation of Mitral valve disease  for possible future Adri- clip found to have moderate MR   Post procedure orders and observations   DAPT: ASA and Brilinta   Statin therapy   Beta blocker therapy   Entresto 51/49mg po BID   Lasix daily increased to 40 mg and given  IV today   Am labs and EKG   Daily weights and fluid restriction   Groin  precautions maintained   Ambulate if stable   Cardiac rehab information provided / referral and communication to cardiac rehab completed   Follow up with cardiologist         65 year old male with h/o HTN, HL, DM, CAD s/p cardiac cath 4/14/10 with confirmed LAD occlusion now s/p single-vessel CABG LIMA to LAD 4/2010 with Dr. Lafleur, cardiomyopathy with CHF with recent admission to [facility] for systolic heart failure and started on Entresto. Pt underwent a NST revealed  global hypokinesis of the remaining wall segments and small reversible defect of mild intensity of the inferoseptal wall consistent with per-infarct ischemia.  Patient endorses progressive dyspnea.   s/p LHC via right groin approach  previous bypass LIMA to LAD patent  s/p One ELIZ to Circumflex  done RFV/RFA hemostasis achieved with Perclose device   Severe LV dysfunction w/ moderate-severe MR.Pt had a HUSEYIN today and found to have moderate MR but  chest Xray revealed pulmonary edema and bilateral pleural effusion with a ProBNP 11,618 and o2 sat declined to 88- 91% . Pt denies SOB but clinical signs of volume overload are evident     Reviewed with Dr Garcia   Admit to Hospital due to comorbidities  and evidence of pulmonary edema on xray   NPO for HUSEYIN this am for further evaluation of Mitral valve disease  for possible future Adri- clip found to have moderate MR   Post procedure orders and observations   CAD :   DAPT: ASA and Brilinta   Statin therapy   Beta blocker therapy   Groin  precautions maintained   Cardiac rehab information provided / referral and communication to cardiac rehab completed   Follow up with cardiologist        Heart failure :   Entresto 51/49mg po BID   Lasix daily increased to 40 mg and given  IV today   Am labs and EKG   Daily weights and fluid restriction   DVT prevention   Ambulate if stable   Heparin Sq     DM - HGAIC 10   continue sliding scale   diabetic education consult

## 2021-01-05 NOTE — PHYSICAL THERAPY INITIAL EVALUATION ADULT - ADDITIONAL COMMENTS
Pt lives with spouse and multiple children in a 2nd floor apartment with 12 CJ 2 handrails + 6 steps 1 handrail. Pt's PLOF was independent in all ADLs (except for donning/doffing socks) + ambulation without an Assistive Device (pt stated that prn family provides hand held assist for ambulation, especially after taking certain medications, which would make pt feels dizzy). Pt has shower chair DME at home.

## 2021-01-05 NOTE — CONSULT NOTE ADULT - ASSESSMENT
66 y/o M with Hx of HTN, HLD, DM, CAD s/p single-vessel CABG LIMA to LAD 4/2010 with Dr. Lafleur, cardiomyopathy with CHF with recent admission to [facility] for systolic heart failure and started on Entresto and lasix 20mg BID, Pt underwent NST as out patient which showed global hypokinesis of the remaining wall segments and small reversible defect of mild intensity of the inferoseptal wall consistent with per-infarct ischemia, she was having worsening SOB, he came in yesterday for an elective left heart catheterization to assess progression of CAD,  and possible cause of cardiomyopath. LHC showed previous bypass LIMA to LAD patent, s/p One ELIZ to Circumflex for 80% stenosis. He was noted to have Severe LV dysfunction w/ moderate-severe MR and had HUSEYIN done today showed EF of 20 to 25%, Multiple left ventricular regional wall motion abnormalities exist, Severely reduced RV systolic function, Severely enlarged LA with SEC in LA and LAAA w/o thrombus, Moderate pleural effusion in the left lateral region.  He had a follow up chest Xray that showed  revealed pulmonary edema and small bilateral pleural effusions.  He was admitted for further diuresis.  He was given Lasix 40mg IV x 1 and is on BID IV Lasix.  CTS called for eval of pleural effusions.

## 2021-01-05 NOTE — H&P ADULT - NSHPPHYSICALEXAM_GEN_ALL_CORE
Vital Signs Last 24 Hrs  T(C): 36.5 (04 Jan 2021 15:07), Max: 36.5 (04 Jan 2021 15:07)  T(F): 97.7 (04 Jan 2021 15:07), Max: 97.7 (04 Jan 2021 15:07)  HR: 71 (05 Jan 2021 12:48) (71 - 83)  BP: 91/68 (05 Jan 2021 12:48) (91/68 - 156/86)  BP(mean): --  RR: 16 (05 Jan 2021 12:48) (16 - 20)  SpO2: 98% (05 Jan 2021 12:48) (94% - 98%)    PHYSICAL EXAM:    GENERAL: Middle age male looking comfortable   HEENT: PERRL, +EOMI  NECK: soft, Supple, No JVD,   CHEST/LUNG: Decrease air entry bilaterally; No wheezing  HEART: S1S2+, Regular rate and rhythm;  has murmurs  ABDOMEN: Soft, Nontender, Nondistended; Bowel sounds present  EXTREMITIES:  1+ Peripheral Pulses, edema  SKIN: No rashes or lesions  NEURO: AAOX3, no focal deficits, no motor r sensory loss  PSYCH: normal mood Vital Signs Last 24 Hrs  T(C): 36.5 (04 Jan 2021 15:07), Max: 36.5 (04 Jan 2021 15:07)  T(F): 97.7 (04 Jan 2021 15:07), Max: 97.7 (04 Jan 2021 15:07)  HR: 71 (05 Jan 2021 12:48) (71 - 83)  BP: 91/68 (05 Jan 2021 12:48) (91/68 - 156/86)  BP(mean): --  RR: 16 (05 Jan 2021 12:48) (16 - 20)  SpO2: 98% (05 Jan 2021 12:48) (94% - 98%)    PHYSICAL EXAM:    GENERAL: Middle age male looking comfortable   HEENT: PERRL, +EOMI  NECK: soft, Supple, No JVD,   CHEST/LUNG: Decrease air entry bilaterally; No wheezing, has basal crackles  HEART: S1S2+, Regular rate and rhythm;  has murmurs  ABDOMEN: Soft, Nontender, Nondistended; Bowel sounds present  EXTREMITIES:  1+ Peripheral Pulses, edema  SKIN: No rashes or lesions  NEURO: AAOX3, no focal deficits, no motor r sensory loss  PSYCH: normal mood

## 2021-01-05 NOTE — PHYSICAL THERAPY INITIAL EVALUATION ADULT - GENERAL OBSERVATIONS, REHAB EVAL
pt received in 3G[facility], ALMA torrez'ed pt for PT. pt observed sitting at EOB, pleasant, cooperative, A&O and c/o 0/10 pain t/o eval.

## 2021-01-05 NOTE — CONSULT NOTE ADULT - PROBLEM SELECTOR RECOMMENDATION 9
Small bilateral pleural effusions on cxr.  Pt on room air (oxygen was not in nose at time of consult and o2 sat was 98%).  Tolerating lying flat.  In no distress and denies SOB.    Pt on Brilinta.    Currently being IV diuresed with Lasix.  Will defer tube for now and allow for diuresis.  Repeat cxr in AM.  Will follow.  Discussed with Dr. Carmona.

## 2021-01-05 NOTE — H&P ADULT - NSHPREVIEWOFSYSTEMS_GEN_ALL_CORE
CONSTITUTIONAL: No fever, some fatigue  RESPIRATORY: No cough, has shortness of breath  CARDIOVASCULAR: No chest pain, palpitations  GASTROINTESTINAL: No abdominal, No nausea, vomiting  NEUROLOGICAL: No headaches,  loss of strength.  MISCELLANEOUS: No joint swelling or pain

## 2021-01-05 NOTE — PHYSICAL THERAPY INITIAL EVALUATION ADULT - IMPAIRMENTS FOUND, PT EVAL
Jaden Rodriguez MD Labossiere, Laura, RN   Caller: Unspecified (1 week ago)             OK to fill for 30 days.      Jaden Rodriguez MD  You 2 hours ago (11:43 AM)      I have prescribed the Flexeril for her.  See if she can come in Friday 8/2 at Noon.     Thanks    Routing comment            Writer called pt and relayed the above messages. She agreed to come to clinic on 8/2 at noon. Because she can see the provider next week, she does not need a refill and asked the writer to hold off on this. Message sent to scheduling.    Pt is functionally independent, no further PT services are indicated, will not follow.

## 2021-01-05 NOTE — H&P ADULT - ASSESSMENT
66 y/o M with Hx of HTN, HLD, DM, CAD s/p cardiac cath 4/14/10 with confirmed LAD occlusion now s/p single-vessel CABG LIMA to LAD 4/2010 with Dr. Lafleur, cardiomyopathy with CHF with recent admission to [facility] for systolic heart failure and started on Entresto and lasix 20mg BID, Pt underwent NST as out patient which showed global hypokinesis of the remaining wall segments and small reversible defect of mild intensity of the inferoseptal wall consistent with per-infarct ischemia, she was having worsening SOB, he came in yesterday for left heart catheterization to assess progression of CAD,  and possible cause of cardiomyopathy, patient is s/p LHC via right groin approach  previous bypass NUNN to LAD patent, s/p One ELIZ to Circumflex  done RFV/RFA, he was noted to have Severe LV dysfunction w/ moderate-severe MR and had HUSEYIN done today showed EF of 20 to 25%, Multiple left ventricular regional wall motion abnormalities exist, Severely reduced RV systolic function, Severely enlarged LA with SEC in LA and LAAA w/o thrombus, Moderate pleural effusion in the left lateral region, his chest Xray revealed pulmonary edema, cardiology requested for the admission for further diuresis.      Plan:     Acute on chronic systolic CHF: Will admit, will do cardiac monitoring, he is on Lasix 20mg BID at home, he is getting 40mg BID here oral, he is getting 1 IV lasix 40mg IV dose, will continue with Entresto, carvedilol, will monitor I/O and electrolytes, will get TSH level.     CAD s/p CABG, s/p cath yesterday got One ELIZ to Circumflex, will continued with Aspirin 81mg and Brilinta, continue atorvastatin 40mg will get lipid panel in am.     HTN: will continued with carvedilol 12.5mg BID with holding parameters, will monitor BP closely.     HLD: continue with statins, will check lipid panel.     DM: Will continue to monitor FS, will provide coverage insulin, will continued with lantus 20units at night, Hba1c is 10.     DVT prophylaxis: Lovenox sc     Dipso: Pending improvement.   66 y/o M with Hx of HTN, HLD, DM, CAD s/p cardiac cath 4/14/10 with confirmed LAD occlusion now s/p single-vessel CABG LIMA to LAD 4/2010 with Dr. Lafleur, cardiomyopathy with CHF with recent admission to [facility] for systolic heart failure and started on Entresto and lasix 20mg BID, Pt underwent NST as out patient which showed global hypokinesis of the remaining wall segments and small reversible defect of mild intensity of the inferoseptal wall consistent with per-infarct ischemia, she was having worsening SOB, he came in yesterday for left heart catheterization to assess progression of CAD,  and possible cause of cardiomyopathy, patient is s/p LHC via right groin approach  previous bypass NUNN to LAD patent, s/p One ELIZ to Circumflex  done RFV/RFA, he was noted to have Severe LV dysfunction w/ moderate-severe MR and had HUSEYIN done today showed EF of 20 to 25%, Multiple left ventricular regional wall motion abnormalities exist, Severely reduced RV systolic function, Severely enlarged LA with SEC in LA and LAAA w/o thrombus, Moderate pleural effusion in the left lateral region, his chest Xray revealed pulmonary edema, cardiology requested for the admission for further diuresis.      Plan:     Acute on chronic systolic CHF: Will admit, will do cardiac monitoring, he is on Lasix 20mg BID at home, he is getting 1 IV lasix 40mg IV dose, will start 40mg BID IV, will continue with Entresto, carvedilol, will monitor I/O and electrolytes, will get TSH level.     b/l right more then left Pleural effusion: Likely from CHF, will dipatricioe, called CT surgery for possible Tap     CAD s/p CABG, s/p cath yesterday got One ELIZ to Circumflex, will continued with Aspirin 81mg and Brilinta, continue atorvastatin 40mg will get lipid panel in am.     HTN: will continued with carvedilol 12.5mg BID with holding parameters, will monitor BP closely.     HLD: continue with statins, will check lipid panel.     DM: Will continue to monitor FS, will provide coverage insulin, will continued with lantus 20units at night, Hba1c is 10.     DVT prophylaxis: Lovenox sc     Dipso: Pending improvement, will get PT eval.  66 y/o M with Hx of HTN, HLD, DM, CAD s/p cardiac cath 4/14/10 with confirmed LAD occlusion now s/p single-vessel CABG LIMA to LAD 4/2010 with Dr. Lafleur, cardiomyopathy with CHF with recent admission to [facility] for systolic heart failure and started on Entresto and lasix 20mg BID, Pt underwent NST as out patient which showed global hypokinesis of the remaining wall segments and small reversible defect of mild intensity of the inferoseptal wall consistent with per-infarct ischemia, she was having worsening SOB, he came in yesterday for left heart catheterization to assess progression of CAD,  and possible cause of cardiomyopathy, patient is s/p LHC via right groin approach  previous bypass NUNN to LAD patent, s/p One ELIZ to Circumflex  done RFV/RFA, he was noted to have Severe LV dysfunction w/ moderate-severe MR and had HUSEYIN done today showed EF of 20 to 25%, Multiple left ventricular regional wall motion abnormalities exist, Severely reduced RV systolic function, Severely enlarged LA with SEC in LA and LAAA w/o thrombus, Moderate pleural effusion in the left lateral region, his chest Xray revealed pulmonary edema, cardiology requested for the admission for further diuresis.      Plan:     Acute on chronic systolic CHF: Will admit, will do cardiac monitoring, he is on Lasix 20mg BID at home, he is getting 1 IV lasix 40mg IV dose, will start 40mg BID IV lasix, will continue with Entresto, carvedilol, will monitor I/O and electrolytes, will get TSH level.     b/l right more then left Pleural effusion: Likely from CHF, will dipatricioe, called CT surgery for possible Tap     CAD s/p CABG, s/p cath yesterday got One ELIZ to Circumflex, will continued with Aspirin 81mg and Brilinta, continue atorvastatin 40mg will get lipid panel in am.     HTN: will continued with carvedilol 12.5mg BID with holding parameters, will monitor BP closely.     HLD: continue with statins, will check lipid panel.     DM: Will continue to monitor FS, will provide coverage insulin, will continued with lantus 20units at night, Hba1c is 10.     DVT prophylaxis: Lovenox sc     Dipso: Pending improvement, will get PT eval.

## 2021-01-05 NOTE — PROGRESS NOTE ADULT - SUBJECTIVE AND OBJECTIVE BOX
[place] [facility] - [facility]                                   [address] [place] [address]                                                      PHONE: [phone]                                                         FAX: [phone]  [email or web address]  ---------------------------------------------------------------------------------------------------------------------------------    Overnight events/patient complaints: s/p cth with Cx PCI, sevre LV dysfunction, elevated LVEDP, TEEtoday to assess MR      Toprol-XL (Faint; Other)    MEDICATIONS  (STANDING):  aspirin enteric coated 81 milliGRAM(s) Oral daily  atorvastatin 40 milliGRAM(s) Oral at bedtime  carvedilol 12.5 milliGRAM(s) Oral every 12 hours  dextrose 40% Gel 15 Gram(s) Oral once  dextrose 5%. 1000 milliLiter(s) (50 mL/Hr) IV Continuous <Continuous>  dextrose 5%. 1000 milliLiter(s) (100 mL/Hr) IV Continuous <Continuous>  dextrose 50% Injectable 25 Gram(s) IV Push once  dextrose 50% Injectable 12.5 Gram(s) IV Push once  dextrose 50% Injectable 25 Gram(s) IV Push once  furosemide    Tablet 20 milliGRAM(s) Oral two times a day  gabapentin 100 milliGRAM(s) Oral three times a day  glucagon  Injectable 1 milliGRAM(s) IntraMuscular once  insulin glargine Injectable (LANTUS) 10 Unit(s) SubCutaneous at bedtime  insulin lispro (ADMELOG) corrective regimen sliding scale   SubCutaneous three times a day before meals  sacubitril 49 mG/valsartan 51 mG 1 Tablet(s) Oral two times a day  ticagrelor 90 milliGRAM(s) Oral every 12 hours    MEDICATIONS  (PRN):      Vital Signs Last 24 Hrs  T(C): 36.5 (04 Jan 2021 15:07), Max: 36.5 (04 Jan 2021 15:07)  T(F): 97.7 (04 Jan 2021 15:07), Max: 97.7 (04 Jan 2021 15:07)  HR: 81 (04 Jan 2021 22:00) (73 - 81)  BP: 143/82 (04 Jan 2021 22:00) (128/81 - 156/86)  BP(mean): --  RR: 20 (04 Jan 2021 22:00) (16 - 20)  SpO2: 95% (04 Jan 2021 22:00) (94% - 98%)  Daily     Daily   ICU Vital Signs Last 24 Hrs  SHASTA MCKINLEY  I&O's Detail    I&O's Summary    Drug Dosing Weight  SHASTA MCKINLEY      PHYSICAL EXAM:  General: Appears well developed, well nourished alert and cooperative.  HEENT: Head; normocephalic, atraumatic.  Eyes: Pupils reactive, cornea wnl.  Neck: Supple, no nodes adenopathy, no NVD or carotid bruit or thyromegaly.  CARDIOVASCULAR: Normal S1 and S2, No murmur, rub, gallop or lift.   LUNGS: basilar rales  ABDOMEN: Soft, nontender without mass or organomegaly. bowel sounds normoactive.  EXTREMITIES: No clubbing, cyanosis or edema. Distal pulses wnl.   SKIN: warm and dry with normal turgor.  NEURO: Alert/oriented x 3/normal motor exam. No pathologic reflexes.    PSYCH: normal affect.        LABS:                        12.7   6.05  )-----------( 168      ( 05 Jan 2021 04:44 )             39.3     01-05    139  |  98  |  25.0<H>  ----------------------------<  147<H>  4.0   |  29.0  |  1.08    Ca    8.6      05 Jan 2021 04:45      SHASTA MCKINLEY      PT/INR - ( 04 Jan 2021 15:12 )   PT: 13.7 sec;   INR: 1.19 ratio         PTT - ( 04 Jan 2021 15:12 )  PTT:34.6 sec         HUSEYIN performed with anesthesia standby, pt tolerated well  Results:  Severe biventricular dysfunction, LVEF 20%  Normal appearing MV with tethering c/w LV dysfunction  Moderate not severe MR  Normal AV  Mod TR PAP 50 mm Hg  Sig SEC in LA and JALEEL w/o thrombus  No PFO  Left pleural effusion

## 2021-01-05 NOTE — PROGRESS NOTE ADULT - ASSESSMENT
Assessment  Ischemic DCM with severe biventricular dysfunction  HFrEF with volume overload  LVEF 20%  Mod not severe MR  Mod pul HTN  s/p remote CABG  s/p Cx PCI      Rec  cont DAPT  cont entresto/coreg/statin  increase lasix  CXR/BNP  life vest on discharge  cont outpt management of CHF in clinic  if no improvement in EF - ICD evaluation   Assessment  Ischemic DCM with severe biventricular dysfunction  HFrEF with volume overload  LVEF 20%  Mod not severe MR  Mod pul HTN  s/p remote CABG  s/p Cx PCI  DM      Rec  cont DAPT  cont entresto/coreg/statin  increase lasix  CXR/BNP  life vest on discharge today  cont outpt management of CHF in clinic  if no improvement in EF - ICD evaluation

## 2021-01-05 NOTE — H&P ADULT - NSHPLABSRESULTS_GEN_ALL_CORE
12.7   6.05  )-----------( 168      ( 05 Jan 2021 04:44 )             39.3     01-05    139  |  98  |  25.0<H>  ----------------------------<  147<H>  4.0   |  29.0  |  1.08    Ca    8.6      05 Jan 2021 04:45      PT/INR - ( 04 Jan 2021 15:12 )   PT: 13.7 sec;   INR: 1.19 ratio         PTT - ( 04 Jan 2021 15:12 )  PTT:34.6 se

## 2021-01-05 NOTE — CONSULT NOTE ADULT - SUBJECTIVE AND OBJECTIVE BOX
Surgeon: Kristine    Consult requesting by: Kadie    HISTORY OF PRESENT ILLNESS:  64 y/o M with Hx of HTN, HLD, DM, CAD s/p single-vessel CABG LIMA to LAD 4/2010 with Dr. Lafleur, cardiomyopathy with CHF with recent admission to [facility] for systolic heart failure and started on Entresto and lasix 20mg BID, Pt underwent NST as out patient which showed global hypokinesis of the remaining wall segments and small reversible defect of mild intensity of the inferoseptal wall consistent with per-infarct ischemia, she was having worsening SOB, he came in yesterday for an elective left heart catheterization to assess progression of CAD,  and possible cause of cardiomyopath. LHC showed previous bypass LIMA to LAD patent, s/p One ELIZ to Circumflex for 80% stenosis. He was noted to have Severe LV dysfunction w/ moderate-severe MR and had HUSEYIN done today showed EF of 20 to 25%, Multiple left ventricular regional wall motion abnormalities exist, Severely reduced RV systolic function, Severely enlarged LA with SEC in LA and LAAA w/o thrombus, Moderate pleural effusion in the left lateral region.  He had a follow up chest Xray that showed  revealed pulmonary edema and small bilateral pleural effusions.  He was admitted for further diuresis.  He was given Lasix 40mg IV x 1 and is on BID IV Lasix.  CTS called for eval of pleural effusions.    Pt lying flat on stretcher in cath recovery.  Nasal O2 on, but not in pt nose.  O2 sat 98%.  Denies SOB or CP.  NAD noted.        PAST MEDICAL & SURGICAL HISTORY:  Abnormal nuclear stress test    Depression    Diabetes    HTN (Hypertension)  controled with meds    CAD (Coronary Artery Disease)  LAD occlusion    S/P CABG (coronary artery bypass graft)    S/P Inguinal Hernia Repair  ishaan with mesh 2008        MEDICATIONS  (STANDING):  aspirin enteric coated 81 milliGRAM(s) Oral daily  atorvastatin 40 milliGRAM(s) Oral at bedtime  carvedilol 12.5 milliGRAM(s) Oral every 12 hours  dextrose 40% Gel 15 Gram(s) Oral once  dextrose 5%. 1000 milliLiter(s) (50 mL/Hr) IV Continuous <Continuous>  dextrose 5%. 1000 milliLiter(s) (100 mL/Hr) IV Continuous <Continuous>  dextrose 50% Injectable 25 Gram(s) IV Push once  dextrose 50% Injectable 12.5 Gram(s) IV Push once  dextrose 50% Injectable 25 Gram(s) IV Push once  furosemide   Injectable 40 milliGRAM(s) IV Push every 12 hours  gabapentin 100 milliGRAM(s) Oral three times a day  glucagon  Injectable 1 milliGRAM(s) IntraMuscular once  heparin   Injectable 5000 Unit(s) SubCutaneous every 12 hours  insulin glargine Injectable (LANTUS) 10 Unit(s) SubCutaneous at bedtime  insulin lispro (ADMELOG) corrective regimen sliding scale   SubCutaneous three times a day before meals  sacubitril 49 mG/valsartan 51 mG 1 Tablet(s) Oral two times a day  ticagrelor 90 milliGRAM(s) Oral every 12 hours    MEDICATIONS  (PRN):    Antiplatelet therapy:                           Last dose/amt:    Allergies    Toprol-XL (Faint; Other)    Intolerances        SOCIAL HISTORY:  Smoker: [ ] Yes  [x ] No        PACK YEARS:                         WHEN QUIT?  ETOH use: [ ] Yes  [ x] No              FREQUENCY / QUANTITY:  Ilicit Drug use:  [ ] Yes  [x ] No  Occupation: Disabled.  Worked in the kitchen at [employer].  Live with: family  Assisted device use: denies use.  FAMILY HISTORY:      Review of Systems  CONSTITUTIONAL:  Fevers[ ] chills[ ] sweats[ ] fatigue[ ] weight loss[ ] weight gain [ ]                                     NEGATIVE [x ]   NEURO:  parathesias[ ] seizures [ ]  syncope [ ]  confusion [ ]                                                                                NEGATIVE[ x]   EYES: glasses[ ]  blurry vision[ ]  discharge[ ] pain[ ] glaucoma [ ]                                                                          NEGATIVE[x ]   ENMT:  difficulty hearing [ ]  vertigo[ ]  dysphagia[ ] epistaxis[ ] recent dental work [ ]                                    NEGATIVE[x ]   CV:  chest pain[ ] palpitations[ ] PATRICIA [ ] diaphoresis [ ]                                                                                           NEGATIVE[ x]   RESPIRATORY:  wheezing[ ] SOB[ ] cough [ ] sputum[ ] hemoptysis[ ]                                                                  NEGATIVE[x ]   GI:  nausea[ ]  vommiting [ ]  diarrhea[ ] constipation [ ] melena [ ]                                                                      NEGATIVE[x ]   : hematuria[ ]  dysuria[ ] urgency[ ] incontinence[ ]                                                                                            NEGATIVE[x ]   MUSKULOSKELETAL:  arthritis[ ]  joint swelling [ ] muscle weakness [ ]                                                                NEGATIVE[x ]   SKIN/BREAST:  rash[ ] itching [ ]  hair loss[ ] masses[ ]                                                                                              NEGATIVE[x]   PSYCH:  dementia [ ] depresion [ ] anxiety[ ]                                                                                                               NEGATIVE[x ]   HEME/LYMPH:  bruises easily[ ] enlarged lymph nodes[ ] tender lymph nodes[ ]                                               NEGATIVE[x ]   ENDOCRINE:  cold intolerance[ ] heat intolerance[ ] polydipsia[ ]                                                                          NEGATIVE[x ]     PHYSICAL EXAM  Vital Signs Last 24 Hrs  T(C): 36.5 (04 Jan 2021 15:07), Max: 36.5 (04 Jan 2021 15:07)  T(F): 97.7 (04 Jan 2021 15:07), Max: 97.7 (04 Jan 2021 15:07)  HR: 75 (05 Jan 2021 14:00) (71 - 83)  BP: 144/78 (05 Jan 2021 14:00) (91/68 - 156/86)  BP(mean): --  RR: 14 (05 Jan 2021 14:00) (14 - 20)  SpO2: 98% (05 Jan 2021 14:00) (86% - 98%)    CONSTITUTIONAL:                                                                     Neuro: WNL[x ] Normal exam oriented to person/place & time with no focal motor or sensory  deficits. Other                     Eyes: WNL[x ]   Normal exam of conjunctiva & lids, pupils equally reactive. Other     ENT: WNL[x ]    Normal exam of nasal/oral mucosa with absence of cyanosis. Other  Neck: WNL[x ]  Normal exam of jugular veins, trachea & thyroid. Other  Chest: WNL[ ] Normal lung exam with good air movement absence of wheezes, rales, or rhonchi: Other Diminished BLL with fine base crackles.                                                                               CV:  Auscultation: normal [x ] S3[ ] S4[ ] Irregular [ ] Rub[ ] Clicks[ ]    Murmurs none:[x ]systolic [ ]  diastolic [ ] holosystolic [ ]  Carotids: No Bruits[x} Other                                                                                                       GI:           WNL[ x] Normal exam of abdomen, liver & spleen with no noted masses or tenderness. Other                                                                                                        Extremities: WNL[ ] Normal no evidence of cyanosis or deformity Edema: none[ ]trace[x ]1+[ ]2+[ ]3+[ ]4+[ ]  Lower Extremity Pulses: Right[pp ] Left[pp ]  SKIN :WNL[ x] Normal exam to inspection & palation. Other:                                                          LABS:                        12.7   6.05  )-----------( 168      ( 05 Jan 2021 04:44 )             39.3     01-05    139  |  98  |  25.0<H>  ----------------------------<  147<H>  4.0   |  29.0  |  1.08    Ca    8.6      05 Jan 2021 04:45      PT/INR - ( 04 Jan 2021 15:12 )   PT: 13.7 sec;   INR: 1.19 ratio         PTT - ( 04 Jan 2021 15:12 )  PTT:34.6 sec            Cardiac Cath: < from: Cardiac Cath Lab - Adult (01.04.21 @ 15:52) >  DIAGNOSTIC IMPRESSIONS: Severe pulmonary HTN.  ICS x 1 to LCx w/ 0% residual stenosis and CARLEEN III flow maintained pre and  post PCI.  IVUS of LCx.  Patent LIMA to LAD.  Severe LV dysfunction w/ moderate-severe MR.  DIAGNOSTIC RECOMMENDATIONS: Asa/brilinta.  Followup at [facility] in 1-2 weeks/?HUSEYIN to assess for mitraclip ( for fxnal  MR/as per COAPT trial ).  INTERVENTIONAL IMPRESSIONS: Severe pulmonary HTN.  ICS x 1 to LCx w/ 0% residual stenosis and CARLEEN III flow maintained pre and  post PCI.  IVUS of LCx.  Patent LIMA to LAD.  Severe LV dysfunction w/ moderate-severe MR.  INTERVENTIONAL RECOMMENDATIONS: Asa/brilinta.  Followup at [facility] in 1-2 weeks/?HUSEYIN to assess for mitraclip ( for fxnal  MR/as per COAPT trial ).  Prepared and signed by  Efren Hernandez MD  Signed 01/04/2021 17:09:37    < end of copied text >      TTE / HUSEYIN: < from: HUSEYIN Echo Doppler (01.05.21 @ 08:34) >  Summary:   1. Left ventricular ejection fraction, by visual estimation, is 20 to 25%.   2. Multiple left ventricular regional wall motion abnormalities exist. See wall motion findings.   3. Moderate to severe right atrial enlargement.   4. Severely enlarged left atrium.   5. Elevated mean left atrial pressure.   6. Mildly increased LV wall thickness.   7. Spectral Doppler shows restrictive pattern of left ventricular myocardial filling (Grade III diastolic dysfunction).   8. There is mild concentric left ventricular hypertrophy.   9. Moderately enlarged right ventricle.  10. Severely reduced RV systolic function.  11. Severely en;larged LA with SEC in LA and LAAA w/o thrombus.  12. Moderate pleural effusion in the left lateral region.  13. Moderate mitral valve regurgitation.  14. Moderate MR by color flow, PISA ERO 0.2 cm2, no evidence of pul vein reversal.  15. Moderate tricuspid regurgitation.  16. Mild pulmonic valve regurgitation.  17. Estimated pulmonary artery systolic pressure is 50.4 mmHg assuming a right atrial pressure of 12 mmHg, which is consistent with moderate pulmonary hypertension.  18. Color flow doppler and intravenous injection of agitated saline demonstrates the presence of an intact intra atrial septum.  MD Naren Electronically signed on 1/5/2021 at 9:11:56 AM  < end of copied text >      < from: Xray Chest 1 View- PORTABLE-Routine (Xray Chest 1 View- PORTABLE-Routine .) (01.05.21 @ 10:48) >     EXAM:  XR CHEST PORTABLE ROUTINE 1V                          PROCEDURE DATE:  01/05/2021          INTERPRETATION:  CLINICAL INFORMATION: HFrEF. HFrEF. ADMDIAG1: I50.1 LEFT VENTRICULAR FAILURE, UNSPECIFIED/.    EXAM: AP chest.    COMPARISON: Prior radiograph dated 9/29/2020.    FINDINGS:  Median sternotomy and CABG.  Pulmonary edema.  Small bilateral pleural effusions. No pneumothorax.  The cardiomediastinal silhouette is magnified due to AP technique.  IMPRESSION: Pulmonary edema and smallbilateral pleural effusions.    TAMMIE ROLLE MD; Attending Radiologist  This document has been electronically signed. Jan 5 2021 10:12AM    < end of copied text >                  Plan:

## 2021-01-05 NOTE — PROGRESS NOTE ADULT - SUBJECTIVE AND OBJECTIVE BOX
Pt s/p LHC post PCI/IVUS of LCx. done via RFA and Rbrachial site   No overnight events Pt reports no chest pain or SOB   Pt NPO for HUSEYIN this am       Vital Signs Last 24 Hrs  T(C): 36.5 (2021 15:07), Max: 36.5 (2021 15:07)  T(F): 97.7 (2021 15:07), Max: 97.7 (2021 15:07)  HR: 81 (2021 22:00) (73 - 81)  BP: 143/82 (2021 22:00) (128/81 - 156/86)  RR: 20 (2021 22:00) (16 - 20)  SpO2: 95% (2021 22:00) (94% - 98%)    Medications:  aspirin enteric coated 81 milliGRAM(s) Oral daily  atorvastatin 40 milliGRAM(s) Oral at bedtime  carvedilol 12.5 milliGRAM(s) Oral every 12 hours  furosemide    Tablet 20 milliGRAM(s) Oral two times a day  gabapentin 100 milliGRAM(s) Oral three times a day  glucagon  Injectable 1 milliGRAM(s) IntraMuscular once  insulin glargine Injectable (LANTUS) 10 Unit(s) SubCutaneous at bedtime  insulin lispro (ADMELOG) corrective regimen sliding scale   SubCutaneous three times a day before meals  sacubitril 49 mG/valsartan 51 mG 1 Tablet(s) Oral two times a day  ticagrelor 90 milliGRAM(s) Oral every 12 hours    Exam   General: A/ox 3, No acute Distress  Neck: Supple, NO JVD  Cardiac: S1 S2, 3/6 ELAINE loudest at apex   Pulmonary: CTAB, Breathing unlabored, No Rhonchi/Rales/Wheezing  Abdomen: Soft, Non -tender, +BS   Extremities: No Rashes, No edema  Femoral cath site no evidence of bleeding + pp hemostasis maintained   Neuro: A/o x 3, No focal deficits  Psch: normal mood , normal affect    LABS:                          12.7   6.05  )-----------( 168      ( 2021 04:44 )             39.3     01-05    139  |  98  |  25.0<H>  ----------------------------<  147<H>  4.0   |  29.0  |  1.08    Ca    8.6      2021 04:45      PT/INR - ( 2021 15:12 )   PT: 13.7 sec;   INR: 1.19 ratio         PTT - ( 2021 15:12 )  PTT:34.6 sec    Cardiac Cath Lab - Adult:   [facility]  Department of Cardiology  [address] [place] [address]  [phone]  Cath Lab Report -- Comprehensive Report  Patient: SHASTA MCKINLEY  Study date: 2021  Account number: [id number]  MR number: [id number]  : 1955  Gender: Male  Race: W  Case Physician(s):  MD Erfen Collins MD  Referring Physician:  INDICATIONS: Post CABG/Low EF/pos stress.  PROCEDURE:  --  Right heart catheterization.  --  Left heart catheterization with ventriculography.  --  Left coronary angiography.  --  Right coronary angiography.  --  Bypass graft angiography.  --  Sonosite.  --  Sheath Exchange for Intervention.  --  Interventional IVUS.  --  Hemostasis with Perclose-Intervention.  --  Intervention on proximal circumflex: drug-eluting stent.  TECHNIQUE: Cardiac catheterization performed electively.  Local anesthetic given. Right femoral artery access. Right femoral vein  access. Right heart catheterization. The procedure was performed utilizing  a catheter. Left heart catheterization. Ventriculography was performed.  Left coronary artery angiography. The vessel was injected utilizing a  catheter. Right coronary artery angiography. The vessel was injected  utilizing a catheter. Graftangiography. The vessel was injected utilizing  a catheter. Sonosite. RADIATION EXPOSURE: 10.4 min. A drug-eluting stent  was performed on the 80 % lesion in the proximal circumflex. Following  intervention there was a 1 % residual stenosis. There was no dissection.  Intravascular ultrasound was performed using a(n) EAGLE EYE SHORT TIP  catheter over a previously placed guidewire. Balloon angioplasty was  performed, using a EUPHORA 3.0MM X 20MM balloon, with 3 inflations and a  maximum inflation pressure of 16 kristi. During the procedure, the previous  guider was changed for a 6F EBU3.5 LAUNCHER guider, and a new BMW 190CM  WIRE wire was advanced across the lesion. A GHASSAN 3.50 X 38MM drug-eluting  stent was placed across the lesion and deployedat a maximum inflation  pressure of 12 kristi. During the procedure, the previous guider was changed  for a 6F EBU3.5 LAUNCHER guider, and a new BMW 190CM WIRE wire was  advanced across the lesion. Balloon angioplasty was performed, using a 3.5  X 15 NCEUPHORA BALLOON balloon, with 3 inflations and a maximum inflation  pressure of 20 kristi. Sheath Exchange for Intervention. Interventional IVUS.  Hemostasis with Perclose-Intervention.  CONTRAST GIVEN: Omnipaque 40 ml. Omnipaque 90 ml.  MEDICATIONS GIVEN: Heparin, 6000 units, IV. 1% Lidocaine, 10 ml,  subcutaneously. 1% Lidocaine, 10 ml, subcutaneously. 0.9NS, 50 ml, IV.  ticagrelor, 180 mg, PO.  VENTRICLES: EF 30% w/ inferior akinesis and global hypokinesis w/  moderate-severe MR.  CORONARY VESSELS: R dominant.  Moderate sized RCA w/ proximal occlusion and distal vessel fills via L  collaterals.  Mild LM disease.  Occluded LAD w/ large vessel filling from graft.  Large LCx w/ 80% proximal stenosis w/ large distal vessel w/ mild disease.  Grafts-1. LIMA to LAD-patent.  CX:   --  Proximal circumflex: There was a 80 % stenosis.  COMPLICATIONS: No complications occurred during the cath lab visit. No  complications occurred during the cath lab visit.  DIAGNOSTIC IMPRESSIONS: Severe pulmonary HTN.  ICS x 1 to LCx w/ 0% residual stenosis and CARLEEN III flow maintained pre and  post PCI.  IVUS of LCx.  Patent LIMA to LAD.  Severe LV dysfunction w/ moderate-severe MR.  DIAGNOSTIC RECOMMENDATIONS: Asa/brilinta.  Followup at [facility] in 1-2 weeks/?HUSEYIN to assess for mitraclip ( for fxnal  MR/as per COAPT trial ).  INTERVENTIONAL IMPRESSIONS: Severe pulmonary HTN.  ICS x 1 to LCx w/ 0% residual stenosis and CARELEN III flow maintained pre and  post PCI.  IVUS of LCx.  Patent LIMA to LAD.  Severe LV dysfunction w/ moderate-severe MR.  INTERVENTIONAL RECOMMENDATIONS: Asa/brilinta.  Followup at [facility] in 1-2 weeks/?HUSEYIN to assess for mitraclip ( for fxnal  MR/as per COAPT trial ).  Prepared and signed by  Efren Hernandez MD  Signed 2021 17:09:37  HEMODYNAMIC TABLES  Pressures:  Baseline  Pressures:  - HR: 51  Pressures:  - Rhythm:  Pressures:  -- Aortic Pressure (S/D/M): 124/71/93  Pressures:  -- Left Ventricle (s/edp): 140/31/--  Pressures:  -- Pulmonary Artery (S/D/M): 62/18/39  Pressures:  -- Pulmonary Capillary Wedge: 26/32/27  Pressures:  -- Right Ventricle (s/edp): 68/17/--  O2 Sats:  Baseline  O2 Sats:  - HR: 51  O2 Sats:  - Rhythm:  O2 Sats:  -- AO: 12.1/89.9/14.79  O2 Sats:  -- PA: 12.1/49.2/8.1  Outputs:  Baseline  Outputs:  -- CALCULATIONS[age]  Outputs:  -- CALCULATIONS: Body Surface Area: 1.64  Outputs:  -- CALCULATIONS: Height in cm: 160.00  Outputs:  -- CALCULATIONS: Sex: Male  Outputs:  -- CALCULATIONS: Weight in k.70  Outputs:  -- OUTPUTS: CO byFick: 3.06  Outputs:  -- OUTPUTS: Patricia cardiac index: 1.87  Outputs:  -- OUTPUTS: O2 consumption: 205.18  Outputs:  -- OUTPUTS: Vo2 Indexed: 125.00  Outputs:  -- RESISTANCES: Left ventricular stroke work: 37.67  Outputs:  -- RESISTANCES: Left Ventricular Stroke Work index: 22.95  Outputs:  -- RESISTANCES: Pulmonary vascular index (dsc): 514.25  Outputs:  -- RESISTANCES: Pulmonary vascular index (Wood Units): 6.43  Outputs:  -- RESISTANCES: Pulmonary vascular resistance (dsc): 313.29  Outputs:  --RESISTANCES: Pulmonary vascular resistance (Wood Units): 3.92  Outputs:  -- RESISTANCES: Total pulmonary index (dsc): 1671.30  Outputs:  -- RESISTANCES: Total pulmonary index (Wood Units): 20.90  Outputs:  -- RESISTANCES: Total pulmonary resistance (dsc): 1018.19  Outputs:  -- RESISTANCES: Total pulmonary resistance (Wood Units): 12.73  Outputs:  -- RESISTANCES: Total vascular index (Wood Units): 49.83  Outputs:  -- RESISTANCES: Total vascular resistance (dsc): 2428.00  Outputs:  -- RESISTANCES: Total vascular resistance (Wood Units): 30.36  Outputs:  -- RESISTANCES: Total vascular resistance index (dsc): 3985.41  Outputs:  -- RESISTANCES: TPR_TVR Ratio: 0.42  Outputs:  -- SHUNTS: Pulmonary flow: 3.06  Outputs:  -- SHUNTS: Qp Indexed: 1.87  Outputs:  -- SHUNTS: Qs Indexed: 1.87  Outputs:  -- SHUNTS: Systemic flow: 3.06 (21 @ 15:52)           Pt s/p LHC post PCI/IVUS of LCx. done via RFA and Rbrachial site   No overnight events Pt reports no chest pain or SOB   Pt NPO for HUSEYIN this am       Vital Signs Last 24 Hrs  T(C): 36.5 (2021 15:07), Max: 36.5 (2021 15:07)  T(F): 97.7 (2021 15:07), Max: 97.7 (2021 15:07)  HR: 81 (2021 22:00) (73 - 81)  BP: 143/82 (2021 22:00) (128/81 - 156/86)  RR: 20 (2021 22:00) (16 - 20)  SpO2: 95% (2021 22:00) (94% - 98%)    Medications:  aspirin enteric coated 81 milliGRAM(s) Oral daily  atorvastatin 40 milliGRAM(s) Oral at bedtime  carvedilol 12.5 milliGRAM(s) Oral every 12 hours  furosemide    Tablet 20 milliGRAM(s) Oral two times a day  gabapentin 100 milliGRAM(s) Oral three times a day  glucagon  Injectable 1 milliGRAM(s) IntraMuscular once  insulin glargine Injectable (LANTUS) 10 Unit(s) SubCutaneous at bedtime  insulin lispro (ADMELOG) corrective regimen sliding scale   SubCutaneous three times a day before meals  sacubitril 49 mG/valsartan 51 mG 1 Tablet(s) Oral two times a day  ticagrelor 90 milliGRAM(s) Oral every 12 hours    Exam   General: A/ox 3, No acute Distress  Neck: Supple, NO JVD  Cardiac: S1 S2, 3/6 ELAINE loudest at apex   Pulmonary:  base crackles upper clear   Abdomen: Soft, Non -tender, +BS   Extremities: No Rashes, No edema  Femoral cath site no evidence of bleeding + pp hemostasis maintained   Neuro: A/o x 3, No focal deficits  Psch: normal mood , normal affect    LABS:                          12.7   6.05  )-----------( 168      ( 2021 04:44 )             39.3     01-05    139  |  98  |  25.0<H>  ----------------------------<  147<H>  4.0   |  29.0  |  1.08    Ca    8.6      2021 04:45      PT/INR - ( 2021 15:12 )   PT: 13.7 sec;   INR: 1.19 ratio         PTT - ( 2021 15:12 )  PTT:34.6 sec    Cardiac Cath Lab - Adult:   [facility]  Department of Cardiology  [address] [place] [address]  [phone]  Cath Lab Report -- Comprehensive Report  Patient: SHASTA MCKINLEY  Study date: 2021  Account number: [id number]  MR number: [id number]  : 1955  Gender: Male  Race: W  Case Physician(s):  MD Efren Collins MD  Referring Physician:  INDICATIONS: Post CABG/Low EF/pos stress.  PROCEDURE:  --  Right heart catheterization.  --  Left heart catheterization with ventriculography.  --  Left coronary angiography.  --  Right coronary angiography.  --  Bypass graft angiography.  --  Sonosite.  --  Sheath Exchange for Intervention.  --  Interventional IVUS.  --  Hemostasis with Perclose-Intervention.  --  Intervention on proximal circumflex: drug-eluting stent.  TECHNIQUE: Cardiac catheterization performed electively.  Local anesthetic given. Right femoral artery access. Right femoral vein  access. Right heart catheterization. The procedure was performed utilizing  a catheter. Left heart catheterization. Ventriculography was performed.  Left coronary artery angiography. The vessel was injected utilizing a  catheter. Right coronary artery angiography. The vessel was injected  utilizing a catheter. Graftangiography. The vessel was injected utilizing  a catheter. Sonosite. RADIATION EXPOSURE: 10.4 min. A drug-eluting stent  was performed on the 80 % lesion in the proximal circumflex. Following  intervention there was a 1 % residual stenosis. There was no dissection.  Intravascular ultrasound was performed using a(n) EAGLE EYE SHORT TIP  catheter over a previously placed guidewire. Balloon angioplasty was  performed, using a EUPHORA 3.0MM X 20MM balloon, with 3 inflations and a  maximum inflation pressure of 16 kristi. During the procedure, the previous  guider was changed for a 6F EBU3.5 LAUNCHER guider, and a new BMW 190CM  WIRE wire was advanced across the lesion. A GHASSAN 3.50 X 38MM drug-eluting  stent was placed across the lesion and deployedat a maximum inflation  pressure of 12 kristi. During the procedure, the previous guider was changed  for a 6F EBU3.5 LAUNCHER guider, and a new BMW 190CM WIRE wire was  advanced across the lesion. Balloon angioplasty was performed, using a 3.5  X 15 NCEUPHORA BALLOON balloon, with 3 inflations and a maximum inflation  pressure of 20 kristi. Sheath Exchange for Intervention. Interventional IVUS.  Hemostasis with Perclose-Intervention.  CONTRAST GIVEN: Omnipaque 40 ml. Omnipaque 90 ml.  MEDICATIONS GIVEN: Heparin, 6000 units, IV. 1% Lidocaine, 10 ml,  subcutaneously. 1% Lidocaine, 10 ml, subcutaneously. 0.9NS, 50 ml, IV.  ticagrelor, 180 mg, PO.  VENTRICLES: EF 30% w/ inferior akinesis and global hypokinesis w/  moderate-severe MR.  CORONARY VESSELS: R dominant.  Moderate sized RCA w/ proximal occlusion and distal vessel fills via L  collaterals.  Mild LM disease.  Occluded LAD w/ large vessel filling from graft.  Large LCx w/ 80% proximal stenosis w/ large distal vessel w/ mild disease.  Grafts-1. LIMA to LAD-patent.  CX:   --  Proximal circumflex: There was a 80 % stenosis.  COMPLICATIONS: No complications occurred during the cath lab visit. No  complications occurred during the cath lab visit.  DIAGNOSTIC IMPRESSIONS: Severe pulmonary HTN.  ICS x 1 to LCx w/ 0% residual stenosis and CARLEEN III flow maintained pre and  post PCI.  IVUS of LCx.  Patent LIMA to LAD.  Severe LV dysfunction w/ moderate-severe MR.  DIAGNOSTIC RECOMMENDATIONS: Asa/brilinta.  Followup at [facility] in 1-2 weeks/?HUSEYIN to assess for mitraclip ( for fxnal  MR/as per COAPT trial ).  INTERVENTIONAL IMPRESSIONS: Severe pulmonary HTN.  ICS x 1 to LCx w/ 0% residual stenosis and CARLEEN III flow maintained pre and  post PCI.  IVUS of LCx.  Patent LIMA to LAD.  Severe LV dysfunction w/ moderate-severe MR.  INTERVENTIONAL RECOMMENDATIONS: Asa/brilinta.  Followup at [facility] in 1-2 weeks/?HUSEYIN to assess for mitraclip ( for fxnal  MR/as per COAPT trial ).  Prepared and signed by  Efren Hernandez MD  Signed 2021 17:09:37  HEMODYNAMIC TABLES  Pressures:  Baseline  Pressures:  - HR: 51  Pressures:  - Rhythm:  Pressures:  -- Aortic Pressure (S/D/M): 124/71/93  Pressures:  -- Left Ventricle (s/edp): 140/31/--  Pressures:  -- Pulmonary Artery (S/D/M): 62/18/39  Pressures:  -- Pulmonary Capillary Wedge: 26/32/27  Pressures:  -- Right Ventricle (s/edp): 68/17/--  O2 Sats:  Baseline  O2 Sats:  - HR: 51  O2 Sats:  - Rhythm:  O2 Sats:  -- AO: 12.1/89.9/14.79  O2 Sats:  -- PA: 12.1/49.2/8.1  Outputs:  Baseline  Outputs:  -- CALCULATIONS[age]  Outputs:  -- CALCULATIONS: Body Surface Area: 1.64  Outputs:  -- CALCULATIONS: Height in cm: 160.00  Outputs:  -- CALCULATIONS: Sex: Male  Outputs:  -- CALCULATIONS: Weight in k.70  Outputs:  -- OUTPUTS: CO byFick: 3.06  Outputs:  -- OUTPUTS: Patricia cardiac index: 1.87  Outputs:  -- OUTPUTS: O2 consumption: 205.18  Outputs:  -- OUTPUTS: Vo2 Indexed: 125.00  Outputs:  -- RESISTANCES: Left ventricular stroke work: 37.67  Outputs:  -- RESISTANCES: Left Ventricular Stroke Work index: 22.95  Outputs:  -- RESISTANCES: Pulmonary vascular index (dsc): 514.25  Outputs:  -- RESISTANCES: Pulmonary vascular index (Wood Units): 6.43  Outputs:  -- RESISTANCES: Pulmonary vascular resistance (dsc): 313.29  Outputs:  --RESISTANCES: Pulmonary vascular resistance (Wood Units): 3.92  Outputs:  -- RESISTANCES: Total pulmonary index (dsc): 1671.30  Outputs:  -- RESISTANCES: Total pulmonary index (Wood Units): 20.90  Outputs:  -- RESISTANCES: Total pulmonary resistance (dsc): 1018.19  Outputs:  -- RESISTANCES: Total pulmonary resistance (Wood Units): 12.73  Outputs:  -- RESISTANCES: Total vascular index (Wood Units): 49.83  Outputs:  -- RESISTANCES: Total vascular resistance (dsc): 2428.00  Outputs:  -- RESISTANCES: Total vascular resistance (Wood Units): 30.36  Outputs:  -- RESISTANCES: Total vascular resistance index (dsc): 3985.41  Outputs:  -- RESISTANCES: TPR_TVR Ratio: 0.42  Outputs:  -- SHUNTS: Pulmonary flow: 3.06  Outputs:  -- SHUNTS: Qp Indexed: 1.87  Outputs:  -- SHUNTS: Qs Indexed: 1.87  Outputs:  -- SHUNTS: Systemic flow: 3.06 (21 @ 15:52)

## 2021-01-06 LAB
ALBUMIN SERPL ELPH-MCNC: 2.8 G/DL — LOW (ref 3.3–5.2)
ALP SERPL-CCNC: 90 U/L — SIGNIFICANT CHANGE UP (ref 40–120)
ALT FLD-CCNC: 7 U/L — SIGNIFICANT CHANGE UP
ANION GAP SERPL CALC-SCNC: 11 MMOL/L — SIGNIFICANT CHANGE UP (ref 5–17)
AST SERPL-CCNC: 24 U/L — SIGNIFICANT CHANGE UP
BILIRUB SERPL-MCNC: 0.6 MG/DL — SIGNIFICANT CHANGE UP (ref 0.4–2)
BUN SERPL-MCNC: 31 MG/DL — HIGH (ref 8–20)
CALCIUM SERPL-MCNC: 8.9 MG/DL — SIGNIFICANT CHANGE UP (ref 8.6–10.2)
CHLORIDE SERPL-SCNC: 100 MMOL/L — SIGNIFICANT CHANGE UP (ref 98–107)
CO2 SERPL-SCNC: 31 MMOL/L — HIGH (ref 22–29)
CREAT SERPL-MCNC: 1.24 MG/DL — SIGNIFICANT CHANGE UP (ref 0.5–1.3)
GLUCOSE BLDC GLUCOMTR-MCNC: 100 MG/DL — HIGH (ref 70–99)
GLUCOSE BLDC GLUCOMTR-MCNC: 103 MG/DL — HIGH (ref 70–99)
GLUCOSE BLDC GLUCOMTR-MCNC: 143 MG/DL — HIGH (ref 70–99)
GLUCOSE BLDC GLUCOMTR-MCNC: 163 MG/DL — HIGH (ref 70–99)
GLUCOSE BLDC GLUCOMTR-MCNC: 310 MG/DL — HIGH (ref 70–99)
GLUCOSE SERPL-MCNC: 173 MG/DL — HIGH (ref 70–99)
HCT VFR BLD CALC: 38.2 % — LOW (ref 39–50)
HCV AB S/CO SERPL IA: 0.11 S/CO — SIGNIFICANT CHANGE UP (ref 0–0.99)
HCV AB SERPL-IMP: SIGNIFICANT CHANGE UP
HGB BLD-MCNC: 12.4 G/DL — LOW (ref 13–17)
INR BLD: 1.22 RATIO — HIGH (ref 0.88–1.16)
MAGNESIUM SERPL-MCNC: 1.8 MG/DL — SIGNIFICANT CHANGE UP (ref 1.8–2.6)
MCHC RBC-ENTMCNC: 28.2 PG — SIGNIFICANT CHANGE UP (ref 27–34)
MCHC RBC-ENTMCNC: 32.5 GM/DL — SIGNIFICANT CHANGE UP (ref 32–36)
MCV RBC AUTO: 87 FL — SIGNIFICANT CHANGE UP (ref 80–100)
PHOSPHATE SERPL-MCNC: 4.8 MG/DL — HIGH (ref 2.4–4.7)
PLATELET # BLD AUTO: 161 K/UL — SIGNIFICANT CHANGE UP (ref 150–400)
POTASSIUM SERPL-MCNC: 3.7 MMOL/L — SIGNIFICANT CHANGE UP (ref 3.5–5.3)
POTASSIUM SERPL-SCNC: 3.7 MMOL/L — SIGNIFICANT CHANGE UP (ref 3.5–5.3)
PROT SERPL-MCNC: 6.5 G/DL — LOW (ref 6.6–8.7)
PROTHROM AB SERPL-ACNC: 14 SEC — HIGH (ref 10.6–13.6)
RBC # BLD: 4.39 M/UL — SIGNIFICANT CHANGE UP (ref 4.2–5.8)
RBC # FLD: 15.3 % — HIGH (ref 10.3–14.5)
SODIUM SERPL-SCNC: 142 MMOL/L — SIGNIFICANT CHANGE UP (ref 135–145)
TSH SERPL-MCNC: 3.63 UIU/ML — SIGNIFICANT CHANGE UP (ref 0.27–4.2)
WBC # BLD: 6.35 K/UL — SIGNIFICANT CHANGE UP (ref 3.8–10.5)
WBC # FLD AUTO: 6.35 K/UL — SIGNIFICANT CHANGE UP (ref 3.8–10.5)

## 2021-01-06 PROCEDURE — 99232 SBSQ HOSP IP/OBS MODERATE 35: CPT

## 2021-01-06 PROCEDURE — 99233 SBSQ HOSP IP/OBS HIGH 50: CPT

## 2021-01-06 PROCEDURE — 93010 ELECTROCARDIOGRAM REPORT: CPT

## 2021-01-06 PROCEDURE — 71045 X-RAY EXAM CHEST 1 VIEW: CPT | Mod: 26,77

## 2021-01-06 PROCEDURE — 71045 X-RAY EXAM CHEST 1 VIEW: CPT | Mod: 26

## 2021-01-06 RX ORDER — FUROSEMIDE 40 MG
80 TABLET ORAL EVERY 12 HOURS
Refills: 0 | Status: DISCONTINUED | OUTPATIENT
Start: 2021-01-06 | End: 2021-01-08

## 2021-01-06 RX ADMIN — TICAGRELOR 90 MILLIGRAM(S): 90 TABLET ORAL at 18:44

## 2021-01-06 RX ADMIN — CARVEDILOL PHOSPHATE 12.5 MILLIGRAM(S): 80 CAPSULE, EXTENDED RELEASE ORAL at 18:43

## 2021-01-06 RX ADMIN — ATORVASTATIN CALCIUM 40 MILLIGRAM(S): 80 TABLET, FILM COATED ORAL at 21:08

## 2021-01-06 RX ADMIN — HEPARIN SODIUM 5000 UNIT(S): 5000 INJECTION INTRAVENOUS; SUBCUTANEOUS at 18:43

## 2021-01-06 RX ADMIN — CARVEDILOL PHOSPHATE 12.5 MILLIGRAM(S): 80 CAPSULE, EXTENDED RELEASE ORAL at 06:06

## 2021-01-06 RX ADMIN — Medication 2: at 08:41

## 2021-01-06 RX ADMIN — GABAPENTIN 100 MILLIGRAM(S): 400 CAPSULE ORAL at 13:55

## 2021-01-06 RX ADMIN — Medication 650 MILLIGRAM(S): at 00:33

## 2021-01-06 RX ADMIN — TICAGRELOR 90 MILLIGRAM(S): 90 TABLET ORAL at 06:06

## 2021-01-06 RX ADMIN — INSULIN GLARGINE 10 UNIT(S): 100 INJECTION, SOLUTION SUBCUTANEOUS at 21:08

## 2021-01-06 RX ADMIN — GABAPENTIN 100 MILLIGRAM(S): 400 CAPSULE ORAL at 06:06

## 2021-01-06 RX ADMIN — Medication 40 MILLIGRAM(S): at 06:06

## 2021-01-06 RX ADMIN — Medication 81 MILLIGRAM(S): at 13:55

## 2021-01-06 RX ADMIN — SACUBITRIL AND VALSARTAN 1 TABLET(S): 24; 26 TABLET, FILM COATED ORAL at 18:43

## 2021-01-06 RX ADMIN — Medication 80 MILLIGRAM(S): at 18:43

## 2021-01-06 RX ADMIN — SACUBITRIL AND VALSARTAN 1 TABLET(S): 24; 26 TABLET, FILM COATED ORAL at 06:06

## 2021-01-06 RX ADMIN — GABAPENTIN 100 MILLIGRAM(S): 400 CAPSULE ORAL at 21:10

## 2021-01-06 RX ADMIN — HEPARIN SODIUM 5000 UNIT(S): 5000 INJECTION INTRAVENOUS; SUBCUTANEOUS at 06:06

## 2021-01-06 NOTE — PROGRESS NOTE ADULT - SUBJECTIVE AND OBJECTIVE BOX
Subjective: Pt sitting up in chair eating breakfast.  Denies CP or SOB.  NAD noted.      Vital Signs:  Vital Signs Last 24 Hrs  T(C): 36.7 (01-06-21 @ 09:08), Max: 37.1 (01-05-21 @ 20:34)  T(F): 98 (01-06-21 @ 09:08), Max: 98.7 (01-05-21 @ 20:34)  HR: 70 (01-06-21 @ 09:08) (69 - 75)  BP: 117/71 (01-06-21 @ 09:08) (91/68 - 147/75)  RR: 17 (01-06-21 @ 09:08) (14 - 17)  SpO2: 96% (01-06-21 @ 09:08) (87% - 99%) on 2 liters.    Telemetry/Alarms:    < from: Xray Chest 1 View- PORTABLE-Routine (Xray Chest 1 View- PORTABLE-Routine .) (01.05.21 @ 10:48) >   EXAM:  XR CHEST PORTABLE ROUTINE 1V                          PROCEDURE DATE:  01/05/2021      INTERPRETATION:  CLINICAL INFORMATION: HFrEF. HFrEF. ADMDIAG1: I50.1 LEFT VENTRICULAR FAILURE, UNSPECIFIED/.    EXAM: AP chest.    COMPARISON: Prior radiograph dated 9/29/2020.    FINDINGS:  Median sternotomy and CABG.  Pulmonary edema.  Small bilateral pleural effusions. No pneumothorax.  The cardiomediastinal silhouette is magnified due to AP technique.    IMPRESSION: Pulmonary edema and smallbilateral pleural effusions.    TAMMIE ROLLE MD; Attending Radiologist  This document has been electronically signed. Jan 5 2021 10:12AM    < end of copied text >      Neurology: A&Ox3.  Respiratory: CTA B/L  CV: RRR, S1S2.  Abdominal: Soft, NT, ND +BS.  Extremities: Trace edema, + peripheral pulses

## 2021-01-06 NOTE — PROGRESS NOTE ADULT - SUBJECTIVE AND OBJECTIVE BOX
[facility]                              [address] [address]                                                 PHONE: [phone]                                                 FAX: [phone]  -----------------------------------------------------------------------------------------------  Pt seen and examined. FU for  shortness of breath     Overnight events/Complaints: Pt breathing better. No chest pain. No orthopnea.    Vital Signs Last 24 Hrs  T(C): 36.7 (06 Jan 2021 09:08), Max: 37.1 (05 Jan 2021 20:34)  T(F): 98 (06 Jan 2021 09:08), Max: 98.7 (05 Jan 2021 20:34)  HR: 70 (06 Jan 2021 09:08) (69 - 75)  BP: 117/71 (06 Jan 2021 09:08) (91/68 - 147/75)  BP(mean): --  RR: 17 (06 Jan 2021 09:08) (14 - 17)  SpO2: 96% (06 Jan 2021 09:08) (87% - 99%)  I&O's Summary      PHYSICAL EXAM:  Constitutional: Appears well developed, well nourished; alert and co-operative  HEENT:     Head: Normocephalic and atraumatic  Neck: supple. No JVD  Cardiovascular: regular S1 S2  Respiratory: Lungs clear to auscultation; no crepitations, no wheeze  Gastrointestinal:  Soft, Non-tender, + BS	  Musculoskeletal: Normal range of motion. Trace edema  Skin: Warm and dry. No cyanosis . No diaphoresis.  Neurologic: Alert oriented to time place and person. Normal strength and no tremor.        LABS:                        12.4   6.35  )-----------( 161      ( 06 Jan 2021 08:05 )             38.2     01-06    142  |  100  |  31.0<H>  ----------------------------<  173<H>  3.7   |  31.0<H>  |  1.24    Ca    8.9      06 Jan 2021 07:59  Phos  4.8     01-06  Mg     1.8     01-06    TPro  6.5<L>  /  Alb  2.8<L>  /  TBili  0.6  /  DBili  x   /  AST  24  /  ALT  7   /  AlkPhos  90  01-06    PT/INR - ( 06 Jan 2021 07:59 )   PT: 14.0 sec;   INR: 1.22 ratio         PTT - ( 04 Jan 2021 15:12 )  PTT:34.6 sec    RADIOLOGY & ADDITIONAL STUDIES: (reviewed)  CXR was independently visualized/reviewed  and demonstrated: pulm edema    CARDIOLOGY TESTING:(reviewed)     12 lead EKG independently visualized/reviewed  and demonstrated NSR with non specific ST changes    ECHOCARDIOGRAM independently visualized/reviewed and demonstrated :    1. Left ventricular ejection fraction, by visual estimation, is 20 to 25%.   2. Multiple left ventricular regional wall motion abnormalities exist. See wall motion findings.   3. Moderate to severe right atrial enlargement.   4. Severely enlarged left atrium.   5. Elevated mean left atrial pressure.   6. Mildly increased LV wall thickness.   7. Spectral Doppler shows restrictive pattern of left ventricular myocardial filling (Grade III diastolic dysfunction).   8. There is mild concentric left ventricular hypertrophy.   9. Moderately enlarged right ventricle.  10. Severely reduced RV systolic function.  11. Severely en;larged LA with SEC in LA and LAAA w/o thrombus.  12. Moderate pleural effusion in the left lateral region.  13. Moderate mitral valve regurgitation.  14. Moderate MR by color flow, PISA ERO 0.2 cm2, no evidence of pul vein reversal.  15. Moderate tricuspid regurgitation.  16. Mild pulmonic valve regurgitation.  17. Estimated pulmonary artery systolic pressure is 50.4 mmHg assuming a right atrial pressure of 12 mmHg, which is consistent with moderate pulmonary hypertension.  18. Color flow doppler and intravenous injection of agitated saline demonstrates the presence of an intact intra atrial septum.    CARDIAC CATH:  VENTRICLES: EF 30% w/ inferior akinesis and global hypokinesis w/  moderate-severe MR.  CORONARY VESSELS: R dominant.  Moderate sized RCA w/ proximal occlusion and distal vessel fills via L  collaterals.  Mild LM disease.  Occluded LAD w/ large vessel filling from graft.  Large LCx w/ 80% proximal stenosis w/ large distal vessel w/ mild disease.  Grafts-1. LIMA to LAD-patent.  CX:   --  Proximal circumflex: There was a 80 % stenosis.  COMPLICATIONS: No complications occurred during the cath lab visit. No  complications occurred during the cath lab visit.  DIAGNOSTIC IMPRESSIONS: Severe pulmonary HTN.  ICS x 1 to LCx w/ 0% residual stenosis and CARLEEN III flow maintained pre and  post PCI.  IVUS of LCx.  Patent LIMA to LAD.  Severe LV dysfunction w/ moderate-severe MR.  DIAGNOSTIC RECOMMENDATIONS: Asa/brilinta.  Followup at [facility] in 1-2 weeks/?HUSEYIN to assess for mitraclip ( for fxnal  MR/as per COAPT trial ).  INTERVENTIONAL IMPRESSIONS: Severe pulmonary HTN.  ICS x 1 to LCx w/ 0% residual stenosis and CARLEEN III flow maintained pre and  post PCI.  IVUS of LCx.  Patent LIMA to LAD.  Severe LV dysfunction w/ moderate-severe MR.      TELEMETRY independently visualized/reviewed and demonstrated : NSR [ 70-90 ]  with no arrhythmias;     MEDICATIONS:(reviewed)  MEDICATIONS  (STANDING):  aspirin enteric coated 81 milliGRAM(s) Oral daily  atorvastatin 40 milliGRAM(s) Oral at bedtime  carvedilol 12.5 milliGRAM(s) Oral every 12 hours  dextrose 40% Gel 15 Gram(s) Oral once  dextrose 5%. 1000 milliLiter(s) (50 mL/Hr) IV Continuous <Continuous>  dextrose 5%. 1000 milliLiter(s) (100 mL/Hr) IV Continuous <Continuous>  dextrose 50% Injectable 25 Gram(s) IV Push once  dextrose 50% Injectable 12.5 Gram(s) IV Push once  dextrose 50% Injectable 25 Gram(s) IV Push once  furosemide   Injectable 40 milliGRAM(s) IV Push every 12 hours  gabapentin 100 milliGRAM(s) Oral three times a day  glucagon  Injectable 1 milliGRAM(s) IntraMuscular once  heparin   Injectable 5000 Unit(s) SubCutaneous every 12 hours  insulin glargine Injectable (LANTUS) 10 Unit(s) SubCutaneous at bedtime  insulin lispro (ADMELOG) corrective regimen sliding scale   SubCutaneous three times a day before meals  sacubitril 49 mG/valsartan 51 mG 1 Tablet(s) Oral two times a day  ticagrelor 90 milliGRAM(s) Oral every 12 hours

## 2021-01-06 NOTE — PROGRESS NOTE ADULT - SUBJECTIVE AND OBJECTIVE BOX
Patient is a 65y old  Male who presents with a chief complaint of CHF (06 Jan 2021 10:37)      INTERVAL HPI/OVERNIGHT EVENTS:  Patient seen awake in bed, interview conducted in Finnish. He reports breathing has improved, feels tired. Denies chest pain, fever or N/V/D. Nurse reports patient was out of bed earlier today. No overnight events.      MEDICATIONS  (STANDING):  aspirin enteric coated 81 milliGRAM(s) Oral daily  atorvastatin 40 milliGRAM(s) Oral at bedtime  carvedilol 12.5 milliGRAM(s) Oral every 12 hours  dextrose 40% Gel 15 Gram(s) Oral once  dextrose 5%. 1000 milliLiter(s) (50 mL/Hr) IV Continuous <Continuous>  dextrose 5%. 1000 milliLiter(s) (100 mL/Hr) IV Continuous <Continuous>  dextrose 50% Injectable 25 Gram(s) IV Push once  dextrose 50% Injectable 12.5 Gram(s) IV Push once  dextrose 50% Injectable 25 Gram(s) IV Push once  furosemide   Injectable 80 milliGRAM(s) IV Push every 12 hours  gabapentin 100 milliGRAM(s) Oral three times a day  glucagon  Injectable 1 milliGRAM(s) IntraMuscular once  heparin   Injectable 5000 Unit(s) SubCutaneous every 12 hours  insulin glargine Injectable (LANTUS) 10 Unit(s) SubCutaneous at bedtime  insulin lispro (ADMELOG) corrective regimen sliding scale   SubCutaneous three times a day before meals  sacubitril 49 mG/valsartan 51 mG 1 Tablet(s) Oral two times a day  ticagrelor 90 milliGRAM(s) Oral every 12 hours    MEDICATIONS  (PRN):  acetaminophen   Tablet .. 650 milliGRAM(s) Oral every 8 hours PRN Moderate Pain (4 - 6)      Allergies    Toprol-XL (Faint; Other)    Intolerances        REVIEW OF SYSTEMS:  CONSTITUTIONAL: No fever, weight loss, or fatigue  EYES: No eye pain, visual disturbances, or discharge  ENMT:  No difficulty hearing, tinnitus, vertigo; No sinus or throat pain  NECK: No pain or stiffness  RESPIRATORY: No cough, wheezing, chills or hemoptysis; No shortness of breath  CARDIOVASCULAR: No chest pain, palpitations, or lightheadedness  GASTROINTESTINAL: No abdominal or epigastric pain. No nausea, vomiting, or hematemesis; No diarrhea or constipation. No melena or hematochezia.  GENITOURINARY: No dysuria, frequency, hematuria, or incontinence  NEUROLOGICAL: No headaches, vertigo, memory loss, loss of strength, numbness, or tremors  SKIN: No itching, burning, rashes, or lesions   ENDOCRINE: No heat or cold intolerance; No hair loss; No polydipsia or polyuria  MUSCULOSKELETAL: No back pain  PSYCHIATRIC: No depression, anxiety, or mood swings  HEME/LYMPH: No easy bruising, or bleeding gums      PHYSICAL EXAM:  GENERAL: NAD, well-groomed, well-developed  HEAD:  Atraumatic, Normocephalic  EYES: EOMI, PERRLA, conjunctiva and sclera clear  ENMT: Moist mucous membranes, Good dentition   NECK: Supple  NERVOUS SYSTEM:  Alert & Oriented X3, Good concentration; Bilateral LE mobile, sensation to light touch intact  CHEST/LUNG: Diminished breath sounds to auscultation bilaterally; No rales, rhonchi, wheezing, or rubs appreciated  HEART: Regular rate and rhythm; No murmurs, rubs, or gallops  ABDOMEN: Soft, Nontender, Nondistended; Bowel sounds present  EXTREMITIES:  2+ Peripheral Pulses, No clubbing or cyanosis  SKIN: No rashes or lesions    Vital Signs Last 24 Hrs  T(C): 36.9 (06 Jan 2021 21:03), Max: 37 (06 Jan 2021 05:58)  T(F): 98.5 (06 Jan 2021 21:03), Max: 98.6 (06 Jan 2021 05:58)  HR: 78 (06 Jan 2021 21:03) (69 - 78)  BP: 125/70 (06 Jan 2021 21:03) (117/71 - 134/77)  BP(mean): --  RR: 18 (06 Jan 2021 21:03) (16 - 18)  SpO2: 95% (06 Jan 2021 21:03) (95% - 97%)    LABS:                        12.4   6.35  )-----------( 161      ( 06 Jan 2021 08:05 )             38.2     06 Jan 2021 07:59    142    |  100    |  31.0   ----------------------------<  173    3.7     |  31.0   |  1.24     Ca    8.9        06 Jan 2021 07:59  Phos  4.8       06 Jan 2021 07:59  Mg     1.8       06 Jan 2021 07:59    TPro  6.5    /  Alb  2.8    /  TBili  0.6    /  DBili  x      /  AST  24     /  ALT  7      /  AlkPhos  90     06 Jan 2021 07:59    PT/INR - ( 06 Jan 2021 07:59 )   PT: 14.0 sec;   INR: 1.22 ratio             CAPILLARY BLOOD GLUCOSE      POCT Blood Glucose.: 310 mg/dL (06 Jan 2021 21:05)  POCT Blood Glucose.: 143 mg/dL (06 Jan 2021 16:49)  POCT Blood Glucose.: 103 mg/dL (06 Jan 2021 13:01)  POCT Blood Glucose.: 100 mg/dL (06 Jan 2021 12:05)  POCT Blood Glucose.: 163 mg/dL (06 Jan 2021 08:10)           [x] Consultant(s) Notes Reviewed Patient is a 65y old  Male who presents with a chief complaint of CHF (06 Jan 2021 10:37)      INTERVAL HPI/OVERNIGHT EVENTS:  Patient seen awake in bed, interview conducted in Maldivian. He reports breathing has improved, feels tired. Denies chest pain, fever or N/V/D. Nurse reports patient was out of bed earlier today. Nurse reports patient had increased dyspnea this AM with some desaturation and was placed on O2 nasal cannula.      MEDICATIONS  (STANDING):  aspirin enteric coated 81 milliGRAM(s) Oral daily  atorvastatin 40 milliGRAM(s) Oral at bedtime  carvedilol 12.5 milliGRAM(s) Oral every 12 hours  dextrose 40% Gel 15 Gram(s) Oral once  dextrose 5%. 1000 milliLiter(s) (50 mL/Hr) IV Continuous <Continuous>  dextrose 5%. 1000 milliLiter(s) (100 mL/Hr) IV Continuous <Continuous>  dextrose 50% Injectable 25 Gram(s) IV Push once  dextrose 50% Injectable 12.5 Gram(s) IV Push once  dextrose 50% Injectable 25 Gram(s) IV Push once  furosemide   Injectable 80 milliGRAM(s) IV Push every 12 hours  gabapentin 100 milliGRAM(s) Oral three times a day  glucagon  Injectable 1 milliGRAM(s) IntraMuscular once  heparin   Injectable 5000 Unit(s) SubCutaneous every 12 hours  insulin glargine Injectable (LANTUS) 10 Unit(s) SubCutaneous at bedtime  insulin lispro (ADMELOG) corrective regimen sliding scale   SubCutaneous three times a day before meals  sacubitril 49 mG/valsartan 51 mG 1 Tablet(s) Oral two times a day  ticagrelor 90 milliGRAM(s) Oral every 12 hours    MEDICATIONS  (PRN):  acetaminophen   Tablet .. 650 milliGRAM(s) Oral every 8 hours PRN Moderate Pain (4 - 6)      Allergies    Toprol-XL (Faint; Other)    Intolerances        REVIEW OF SYSTEMS:  CONSTITUTIONAL: No fever, weight loss, or fatigue  EYES: No eye pain, visual disturbances, or discharge  ENMT:  No difficulty hearing, tinnitus, vertigo; No sinus or throat pain  NECK: No pain or stiffness  RESPIRATORY: No cough, wheezing, chills or hemoptysis; No shortness of breath  CARDIOVASCULAR: No chest pain, palpitations, or lightheadedness  GASTROINTESTINAL: No abdominal or epigastric pain. No nausea, vomiting, or hematemesis; No diarrhea or constipation. No melena or hematochezia.  GENITOURINARY: No dysuria, frequency, hematuria, or incontinence  NEUROLOGICAL: No headaches, vertigo, memory loss, loss of strength, numbness, or tremors  SKIN: No itching, burning, rashes, or lesions   ENDOCRINE: No heat or cold intolerance; No hair loss; No polydipsia or polyuria  MUSCULOSKELETAL: No back pain  PSYCHIATRIC: No depression, anxiety, or mood swings  HEME/LYMPH: No easy bruising, or bleeding gums      PHYSICAL EXAM:  GENERAL: NAD, well-groomed, well-developed  HEAD:  Atraumatic, Normocephalic  EYES: EOMI, PERRLA, conjunctiva and sclera clear  ENMT: Moist mucous membranes, Good dentition   NECK: Supple  NERVOUS SYSTEM:  Alert & Oriented X3, Good concentration; Bilateral LE mobile, sensation to light touch intact  CHEST/LUNG: Diminished breath sounds to auscultation bilaterally; No rales, rhonchi, wheezing, or rubs appreciated  HEART: Regular rate and rhythm; No murmurs, rubs, or gallops  ABDOMEN: Soft, Nontender, Nondistended; Bowel sounds present  EXTREMITIES:  2+ Peripheral Pulses, No clubbing or cyanosis  SKIN: No rashes or lesions    Vital Signs Last 24 Hrs  T(C): 36.9 (06 Jan 2021 21:03), Max: 37 (06 Jan 2021 05:58)  T(F): 98.5 (06 Jan 2021 21:03), Max: 98.6 (06 Jan 2021 05:58)  HR: 78 (06 Jan 2021 21:03) (69 - 78)  BP: 125/70 (06 Jan 2021 21:03) (117/71 - 134/77)  BP(mean): --  RR: 18 (06 Jan 2021 21:03) (16 - 18)  SpO2: 95% (06 Jan 2021 21:03) (95% - 97%)    LABS:                        12.4   6.35  )-----------( 161      ( 06 Jan 2021 08:05 )             38.2     06 Jan 2021 07:59    142    |  100    |  31.0   ----------------------------<  173    3.7     |  31.0   |  1.24     Ca    8.9        06 Jan 2021 07:59  Phos  4.8       06 Jan 2021 07:59  Mg     1.8       06 Jan 2021 07:59    TPro  6.5    /  Alb  2.8    /  TBili  0.6    /  DBili  x      /  AST  24     /  ALT  7      /  AlkPhos  90     06 Jan 2021 07:59    PT/INR - ( 06 Jan 2021 07:59 )   PT: 14.0 sec;   INR: 1.22 ratio             CAPILLARY BLOOD GLUCOSE      POCT Blood Glucose.: 310 mg/dL (06 Jan 2021 21:05)  POCT Blood Glucose.: 143 mg/dL (06 Jan 2021 16:49)  POCT Blood Glucose.: 103 mg/dL (06 Jan 2021 13:01)  POCT Blood Glucose.: 100 mg/dL (06 Jan 2021 12:05)  POCT Blood Glucose.: 163 mg/dL (06 Jan 2021 08:10)           [x] Consultant(s) Notes Reviewed

## 2021-01-06 NOTE — PROGRESS NOTE ADULT - ASSESSMENT
65y Male with prior history of Ischemic DCM with severe biventricular dysfunction, HFrEF with volume overload, LVEF 20%, Mod not severe MR, Mod pul HTN, s/p remote CABG, s/p Cx PCI, DM    CAD  - Continue ASA with Brilinta    HFrEF  - Continue Entresto with Coreg  - Change to lasix 40 mg po BID on d/c  - LifeVest on d/c    HTN  - BP controlled    Dyslipidemia  - Continue statin

## 2021-01-06 NOTE — PROGRESS NOTE ADULT - PROBLEM SELECTOR PLAN 1
Continue to diurese per cardiology.  No intervention planned from CTS standpoint.   Discussed with Dr. Barker this AM.  Agree with diuresis.  Thoracic surgery to sign off. Please reconsult as necessary.  Discussed with Dr. Carmona.

## 2021-01-06 NOTE — PROGRESS NOTE ADULT - ASSESSMENT
66 y/o M with Hx of HTN, HLD, DM, CAD s/p single-vessel CABG LIMA to LAD 4/2010 with Dr. Lafleur, cardiomyopathy with CHF with recent admission to [facility] for systolic heart failure and started on Entresto and lasix 20mg BID, Pt underwent NST as out patient which showed global hypokinesis of the remaining wall segments and small reversible defect of mild intensity of the inferoseptal wall consistent with per-infarct ischemia, she was having worsening SOB, he came in yesterday for an elective left heart catheterization to assess progression of CAD,  and possible cause of cardiomyopath. LHC showed previous bypass LIMA to LAD patent, s/p One ELIZ to Circumflex for 80% stenosis. He was noted to have Severe LV dysfunction w/ moderate-severe MR and had HUSEYIN done today showed EF of 20 to 25%, Multiple left ventricular regional wall motion abnormalities exist, Severely reduced RV systolic function, Severely enlarged LA with SEC in LA and LAAA w/o thrombus, Moderate pleural effusion in the left lateral region.  He had a follow up chest Xray that showed  revealed pulmonary edema and small bilateral pleural effusions.  He was admitted for further diuresis.  He was given Lasix 40mg IV x 1 and is on BID IV Lasix.  CTS called for eval of pleural effusions.

## 2021-01-06 NOTE — PROGRESS NOTE ADULT - ASSESSMENT
66 y/o M with Hx of HTN, HLD, DM, CAD s/p cardiac cath 4/14/10 with confirmed LAD occlusion now s/p single-vessel CABG LIMA to LAD 4/2010 with Dr. Lafleur, cardiomyopathy with CHF with recent admission to [facility] for systolic heart failure and started on Entresto and lasix 20mg BID, Pt underwent NST as out patient which showed global hypokinesis of the remaining wall segments and small reversible defect of mild intensity of the inferoseptal wall consistent with per-infarct ischemia, she was having worsening SOB, he came in yesterday for left heart catheterization to assess progression of CAD,  and possible cause of cardiomyopathy, patient is s/p LHC via right groin approach  previous bypass NUNN to LAD patent, s/p One ELIZ to Circumflex  done RFV/RFA, he was noted to have Severe LV dysfunction w/ moderate-severe MR and had HUSEYIN done today showed EF of 20 to 25%, Multiple left ventricular regional wall motion abnormalities exist, Severely reduced RV systolic function, Severely enlarged LA with SEC in LA and LAAA w/o thrombus, Moderate pleural effusion in the left lateral region, his chest Xray revealed pulmonary edema, cardiology requested for the admission for further diuresis.          Acute on chronic systolic CHF:   continue with Entresto, carvedilol,  increased lasix to 80mg IV BID, monitoring electrolytes  cardiology following  will switch to 40mg PO BID on discharge  Lifevest on d/c as per cards    bilateral pleural effusion 2/2 CHF  no changes on CXR  CT surgery signed off  increased lasix today      CAD s/p CABG  -s/p cath this admission, one ELIZ to Circumflex  -continue with Aspirin 81mg and Brilinta,   -continue atorvastatin 40mg, will get lipid panel in AM.     HTN  -continue carvedilol 12.5mg BID with holding parameters, will monitor BP closely.     HLD: continue with statin, will check lipid panel.     DM:  -A1c 10%  -continue lantus 20hs  -insulin sliding scale    DVT prophylaxis: Lovenox SQ    Dipso: Pending improvement, will get PT eval.  64 y/o M with Hx of HTN, HLD, DM, CAD s/p cardiac cath 4/14/10 with confirmed LAD occlusion now s/p single-vessel CABG LIMA to LAD 4/2010 with Dr. Lafleur, cardiomyopathy with CHF with recent admission to [facility] for systolic heart failure and started on Entresto and lasix 20mg BID, Pt underwent NST as out patient which showed global hypokinesis of the remaining wall segments and small reversible defect of mild intensity of the inferoseptal wall consistent with per-infarct ischemia, she was having worsening SOB, he came in yesterday for left heart catheterization to assess progression of CAD,  and possible cause of cardiomyopathy, patient is s/p LHC via right groin approach  previous bypass NUNN to LAD patent, s/p One ELIZ to Circumflex  done RFV/RFA, he was noted to have Severe LV dysfunction w/ moderate-severe MR and had HUSEYIN done today showed EF of 20 to 25%, Multiple left ventricular regional wall motion abnormalities exist, Severely reduced RV systolic function, Severely enlarged LA with SEC in LA and LAAA w/o thrombus, Moderate pleural effusion in the left lateral region, his chest Xray revealed pulmonary edema, cardiology requested for the admission for further diuresis.          Acute on chronic systolic CHF:   continue with Entresto, carvedilol,  increased lasix to 80mg IV BID, monitoring electrolytes  cardiology following  will switch to 40mg PO BID on discharge  Lifevest on d/c as per cards    bilateral pleural effusion 2/2 CHF  no changes on CXR  CT surgery signed off  increased lasix today      CAD s/p CABG  -s/p cath this admission, one ELIZ to Circumflex  -continue with Aspirin 81mg and Brilinta,   -continue atorvastatin 40mg, will get lipid panel in AM.     HTN  -continue carvedilol 12.5mg BID with holding parameters, will monitor BP closely.     HLD: continue with statin, will check lipid panel.     DM:  -A1c 10%  -continue lantus 20hs  -insulin sliding scale    DVT prophylaxis: Lovenox SQ    Dispo: likely 24-48h 66 y/o M with Hx of HTN, HLD, DM, CAD s/p cardiac cath 4/14/10 with confirmed LAD occlusion now s/p single-vessel CABG LIMA to LAD 4/2010 with Dr. Lafleur, cardiomyopathy with CHF with recent admission to [facility] for systolic heart failure and started on Entresto and lasix 20mg BID, Pt underwent NST as out patient which showed global hypokinesis of the remaining wall segments and small reversible defect of mild intensity of the inferoseptal wall consistent with per-infarct ischemia, she was having worsening SOB, he came in yesterday for left heart catheterization to assess progression of CAD,  and possible cause of cardiomyopathy, patient is s/p LHC via right groin approach  previous bypass NUNN to LAD patent, s/p One ELIZ to Circumflex  done RFV/RFA, he was noted to have Severe LV dysfunction w/ moderate-severe MR and had HUSEYIN done today showed EF of 20 to 25%, Multiple left ventricular regional wall motion abnormalities exist, Severely reduced RV systolic function, Severely enlarged LA with SEC in LA and LAAA w/o thrombus, Moderate pleural effusion in the left lateral region, his chest Xray revealed pulmonary edema, cardiology requested for the admission for further diuresis.          Acute on chronic systolic CHF:   continue with Entresto, carvedilol,  increased lasix to 80mg IV BID, monitoring electrolytes  cardiology following  will switch to 40mg PO BID on discharge  Lifevest on d/c as per cards    bilateral pleural effusion 2/2 CHF  no changes on CXR  CT surgery signed off  increased lasix today  will try weaning O2 tomorrow      CAD s/p CABG  -s/p cath this admission, one ELIZ to Circumflex  -continue with Aspirin 81mg and Brilinta,   -continue atorvastatin 40mg, will get lipid panel in AM.     HTN  -continue carvedilol 12.5mg BID with holding parameters, will monitor BP closely.     HLD: continue with statin, will check lipid panel.     DM:  -A1c 10%  -continue lantus 20hs  -insulin sliding scale    DVT prophylaxis: Lovenox SQ    Dispo: likely 24-48h

## 2021-01-07 ENCOUNTER — TRANSCRIPTION ENCOUNTER (OUTPATIENT)
Age: 66
End: 2021-01-07

## 2021-01-07 LAB
ANION GAP SERPL CALC-SCNC: 12 MMOL/L — SIGNIFICANT CHANGE UP (ref 5–17)
BUN SERPL-MCNC: 33 MG/DL — HIGH (ref 8–20)
CALCIUM SERPL-MCNC: 8.9 MG/DL — SIGNIFICANT CHANGE UP (ref 8.6–10.2)
CHLORIDE SERPL-SCNC: 97 MMOL/L — LOW (ref 98–107)
CHOLEST SERPL-MCNC: 112 MG/DL — SIGNIFICANT CHANGE UP
CO2 SERPL-SCNC: 33 MMOL/L — HIGH (ref 22–29)
CREAT SERPL-MCNC: 1.35 MG/DL — HIGH (ref 0.5–1.3)
GLUCOSE BLDC GLUCOMTR-MCNC: 157 MG/DL — HIGH (ref 70–99)
GLUCOSE BLDC GLUCOMTR-MCNC: 223 MG/DL — HIGH (ref 70–99)
GLUCOSE BLDC GLUCOMTR-MCNC: 244 MG/DL — HIGH (ref 70–99)
GLUCOSE BLDC GLUCOMTR-MCNC: 59 MG/DL — LOW (ref 70–99)
GLUCOSE BLDC GLUCOMTR-MCNC: 81 MG/DL — SIGNIFICANT CHANGE UP (ref 70–99)
GLUCOSE SERPL-MCNC: 127 MG/DL — HIGH (ref 70–99)
HCT VFR BLD CALC: 39.1 % — SIGNIFICANT CHANGE UP (ref 39–50)
HDLC SERPL-MCNC: 47 MG/DL — SIGNIFICANT CHANGE UP
HGB BLD-MCNC: 12.7 G/DL — LOW (ref 13–17)
LIPID PNL WITH DIRECT LDL SERPL: 54 MG/DL — SIGNIFICANT CHANGE UP
MAGNESIUM SERPL-MCNC: 2 MG/DL — SIGNIFICANT CHANGE UP (ref 1.6–2.6)
MCHC RBC-ENTMCNC: 28.2 PG — SIGNIFICANT CHANGE UP (ref 27–34)
MCHC RBC-ENTMCNC: 32.5 GM/DL — SIGNIFICANT CHANGE UP (ref 32–36)
MCV RBC AUTO: 86.7 FL — SIGNIFICANT CHANGE UP (ref 80–100)
NON HDL CHOLESTEROL: 65 MG/DL — SIGNIFICANT CHANGE UP
PLATELET # BLD AUTO: 157 K/UL — SIGNIFICANT CHANGE UP (ref 150–400)
POTASSIUM SERPL-MCNC: 3.6 MMOL/L — SIGNIFICANT CHANGE UP (ref 3.5–5.3)
POTASSIUM SERPL-SCNC: 3.6 MMOL/L — SIGNIFICANT CHANGE UP (ref 3.5–5.3)
RBC # BLD: 4.51 M/UL — SIGNIFICANT CHANGE UP (ref 4.2–5.8)
RBC # FLD: 15.2 % — HIGH (ref 10.3–14.5)
SODIUM SERPL-SCNC: 142 MMOL/L — SIGNIFICANT CHANGE UP (ref 135–145)
TRIGL SERPL-MCNC: 55 MG/DL — SIGNIFICANT CHANGE UP
WBC # BLD: 6.88 K/UL — SIGNIFICANT CHANGE UP (ref 3.8–10.5)
WBC # FLD AUTO: 6.88 K/UL — SIGNIFICANT CHANGE UP (ref 3.8–10.5)

## 2021-01-07 PROCEDURE — 99232 SBSQ HOSP IP/OBS MODERATE 35: CPT

## 2021-01-07 RX ORDER — FUROSEMIDE 40 MG
1 TABLET ORAL
Qty: 0 | Refills: 0 | DISCHARGE

## 2021-01-07 RX ORDER — FUROSEMIDE 40 MG
1 TABLET ORAL
Qty: 60 | Refills: 0
Start: 2021-01-07 | End: 2021-02-05

## 2021-01-07 RX ADMIN — Medication 4: at 17:59

## 2021-01-07 RX ADMIN — Medication 81 MILLIGRAM(S): at 08:58

## 2021-01-07 RX ADMIN — GABAPENTIN 100 MILLIGRAM(S): 400 CAPSULE ORAL at 04:50

## 2021-01-07 RX ADMIN — TICAGRELOR 90 MILLIGRAM(S): 90 TABLET ORAL at 04:50

## 2021-01-07 RX ADMIN — SACUBITRIL AND VALSARTAN 1 TABLET(S): 24; 26 TABLET, FILM COATED ORAL at 04:50

## 2021-01-07 RX ADMIN — HEPARIN SODIUM 5000 UNIT(S): 5000 INJECTION INTRAVENOUS; SUBCUTANEOUS at 04:50

## 2021-01-07 RX ADMIN — SACUBITRIL AND VALSARTAN 1 TABLET(S): 24; 26 TABLET, FILM COATED ORAL at 18:01

## 2021-01-07 RX ADMIN — GABAPENTIN 100 MILLIGRAM(S): 400 CAPSULE ORAL at 21:35

## 2021-01-07 RX ADMIN — Medication 2: at 08:57

## 2021-01-07 RX ADMIN — ATORVASTATIN CALCIUM 40 MILLIGRAM(S): 80 TABLET, FILM COATED ORAL at 21:35

## 2021-01-07 RX ADMIN — Medication 80 MILLIGRAM(S): at 04:53

## 2021-01-07 RX ADMIN — INSULIN GLARGINE 10 UNIT(S): 100 INJECTION, SOLUTION SUBCUTANEOUS at 21:36

## 2021-01-07 RX ADMIN — Medication 80 MILLIGRAM(S): at 18:01

## 2021-01-07 RX ADMIN — CARVEDILOL PHOSPHATE 12.5 MILLIGRAM(S): 80 CAPSULE, EXTENDED RELEASE ORAL at 04:50

## 2021-01-07 RX ADMIN — TICAGRELOR 90 MILLIGRAM(S): 90 TABLET ORAL at 18:01

## 2021-01-07 RX ADMIN — HEPARIN SODIUM 5000 UNIT(S): 5000 INJECTION INTRAVENOUS; SUBCUTANEOUS at 18:01

## 2021-01-07 RX ADMIN — CARVEDILOL PHOSPHATE 12.5 MILLIGRAM(S): 80 CAPSULE, EXTENDED RELEASE ORAL at 18:01

## 2021-01-07 RX ADMIN — GABAPENTIN 100 MILLIGRAM(S): 400 CAPSULE ORAL at 12:44

## 2021-01-07 NOTE — PROGRESS NOTE ADULT - SUBJECTIVE AND OBJECTIVE BOX
[place] [facility], THE HEART CENTER                                   [address] [place] [address]                                                      PHONE: [phone]                                                         FAX: [phone]  [email or web address]  ---------------------------------------------------------------------------------------------------------------------------------    Overnight events/patient complaints:      PAST MEDICAL & SURGICAL HISTORY:  Abnormal nuclear stress test    Depression    Diabetes    HTN (Hypertension)  controled with meds    CAD (Coronary Artery Disease)  LAD occlusion    S/P CABG (coronary artery bypass graft)    S/P Inguinal Hernia Repair  ishaan with mesh 2008        Toprol-XL (Faint; Other)    MEDICATIONS  (STANDING):  aspirin enteric coated 81 milliGRAM(s) Oral daily  atorvastatin 40 milliGRAM(s) Oral at bedtime  carvedilol 12.5 milliGRAM(s) Oral every 12 hours  dextrose 40% Gel 15 Gram(s) Oral once  dextrose 5%. 1000 milliLiter(s) (100 mL/Hr) IV Continuous <Continuous>  dextrose 5%. 1000 milliLiter(s) (50 mL/Hr) IV Continuous <Continuous>  dextrose 50% Injectable 25 Gram(s) IV Push once  dextrose 50% Injectable 12.5 Gram(s) IV Push once  dextrose 50% Injectable 25 Gram(s) IV Push once  furosemide   Injectable 80 milliGRAM(s) IV Push every 12 hours  gabapentin 100 milliGRAM(s) Oral three times a day  glucagon  Injectable 1 milliGRAM(s) IntraMuscular once  heparin   Injectable 5000 Unit(s) SubCutaneous every 12 hours  insulin glargine Injectable (LANTUS) 10 Unit(s) SubCutaneous at bedtime  insulin lispro (ADMELOG) corrective regimen sliding scale   SubCutaneous three times a day before meals  sacubitril 49 mG/valsartan 51 mG 1 Tablet(s) Oral two times a day  ticagrelor 90 milliGRAM(s) Oral every 12 hours    MEDICATIONS  (PRN):  acetaminophen   Tablet .. 650 milliGRAM(s) Oral every 8 hours PRN Moderate Pain (4 - 6)      Vital Signs Last 24 Hrs  T(C): 36.6 (07 Jan 2021 04:49), Max: 36.9 (06 Jan 2021 15:30)  T(F): 97.8 (07 Jan 2021 04:49), Max: 98.5 (06 Jan 2021 15:30)  HR: 70 (07 Jan 2021 04:49) (70 - 78)  BP: 137/74 (07 Jan 2021 04:49) (117/71 - 137/74)  BP(mean): --  RR: 18 (07 Jan 2021 04:49) (18 - 18)  SpO2: 97% (07 Jan 2021 04:49) (95% - 97%)  ICU Vital Signs Last 24 Hrs  SHASTA MCKINLEY  I&O's Detail    06 Jan 2021 07:01  -  07 Jan 2021 07:00  --------------------------------------------------------  IN:  Total IN: 0 mL    OUT:    Voided (mL): 1450 mL  Total OUT: 1450 mL    Total NET: -1450 mL        I&O's Summary    06 Jan 2021 07:01  -  07 Jan 2021 07:00  --------------------------------------------------------  IN: 0 mL / OUT: 1450 mL / NET: -1450 mL      Drug Dosing Weight  SHASTA MCKINLEY      PHYSICAL EXAM:  General: Appears well developed, well nourished alert and cooperative.  HEENT: Head; normocephalic, atraumatic.  Eyes: Pupils reactive, cornea wnl.  Neck: Supple, no nodes adenopathy, no NVD or carotid bruit or thyromegaly.  CARDIOVASCULAR: Normal S1 and S2, No murmur, rub, gallop or lift.   LUNGS: No rales, rhonchi or wheeze. Normal breath sounds bilaterally.  ABDOMEN: Soft, nontender without mass or organomegaly. bowel sounds normoactive.  EXTREMITIES: No clubbing, cyanosis or edema. Distal pulses wnl.   SKIN: warm and dry with normal turgor.  NEURO: Alert/oriented x 3/normal motor exam. No pathologic reflexes.    PSYCH: normal affect.        LABS:                        12.7   6.88  )-----------( 157      ( 07 Jan 2021 09:52 )             39.1     01-06    142  |  100  |  31.0<H>  ----------------------------<  173<H>  3.7   |  31.0<H>  |  1.24    Ca    8.9      06 Jan 2021 07:59  Phos  4.8     01-06  Mg     1.8     01-06    TPro  6.5<L>  /  Alb  2.8<L>  /  TBili  0.6  /  DBili  x   /  AST  24  /  ALT  7   /  AlkPhos  90  01-06    SHASTA MCKINLEY      PT/INR - ( 06 Jan 2021 07:59 )   PT: 14.0 sec;   INR: 1.22 ratio         65y Male with prior history of Ischemic DCM with severe biventricular dysfunction, HFrEF with volume overload, LVEF 20%, Mod not severe MR, Mod pul HTN, s/p remote CABG, s/p Cx PCI, DM    CAD  - Continue ASA with Brilinta    Acute on chronic HFrEF  - Continue Entresto with Coreg  -awaiting this am labs   - Change to lasix 40 mg po BID on d/c  - LifeVest on d/c    HTN  - BP controlled    Dyslipidemia  - Continue statin      Thank you for allowing [facility] to participate in the care of this patient.  Please feel free to call with any questions or concerns.                  [place] [facility], THE HEART CENTER                                   [address] [place] [address]                                                      PHONE: [phone]                                                         FAX: [phone]  [email or web address]  ---------------------------------------------------------------------------------------------------------------------------------    Overnight events/patient complaints:  pt wants to go home     PAST MEDICAL & SURGICAL HISTORY:  Abnormal nuclear stress test    Depression    Diabetes    HTN (Hypertension)  controled with meds    CAD (Coronary Artery Disease)  LAD occlusion    S/P CABG (coronary artery bypass graft)    S/P Inguinal Hernia Repair  ishaan with mesh 2008        Toprol-XL (Faint; Other)    MEDICATIONS  (STANDING):  aspirin enteric coated 81 milliGRAM(s) Oral daily  atorvastatin 40 milliGRAM(s) Oral at bedtime  carvedilol 12.5 milliGRAM(s) Oral every 12 hours  dextrose 40% Gel 15 Gram(s) Oral once  dextrose 5%. 1000 milliLiter(s) (100 mL/Hr) IV Continuous <Continuous>  dextrose 5%. 1000 milliLiter(s) (50 mL/Hr) IV Continuous <Continuous>  dextrose 50% Injectable 25 Gram(s) IV Push once  dextrose 50% Injectable 12.5 Gram(s) IV Push once  dextrose 50% Injectable 25 Gram(s) IV Push once  furosemide   Injectable 80 milliGRAM(s) IV Push every 12 hours  gabapentin 100 milliGRAM(s) Oral three times a day  glucagon  Injectable 1 milliGRAM(s) IntraMuscular once  heparin   Injectable 5000 Unit(s) SubCutaneous every 12 hours  insulin glargine Injectable (LANTUS) 10 Unit(s) SubCutaneous at bedtime  insulin lispro (ADMELOG) corrective regimen sliding scale   SubCutaneous three times a day before meals  sacubitril 49 mG/valsartan 51 mG 1 Tablet(s) Oral two times a day  ticagrelor 90 milliGRAM(s) Oral every 12 hours    MEDICATIONS  (PRN):  acetaminophen   Tablet .. 650 milliGRAM(s) Oral every 8 hours PRN Moderate Pain (4 - 6)      Vital Signs Last 24 Hrs  T(C): 36.6 (07 Jan 2021 04:49), Max: 36.9 (06 Jan 2021 15:30)  T(F): 97.8 (07 Jan 2021 04:49), Max: 98.5 (06 Jan 2021 15:30)  HR: 70 (07 Jan 2021 04:49) (70 - 78)  BP: 137/74 (07 Jan 2021 04:49) (117/71 - 137/74)  BP(mean): --  RR: 18 (07 Jan 2021 04:49) (18 - 18)  SpO2: 97% (07 Jan 2021 04:49) (95% - 97%)  ICU Vital Signs Last 24 Hrs  SHASTA MCKINLEY  I&O's Detail    06 Jan 2021 07:01  -  07 Jan 2021 07:00  --------------------------------------------------------  IN:  Total IN: 0 mL    OUT:    Voided (mL): 1450 mL  Total OUT: 1450 mL    Total NET: -1450 mL        I&O's Summary    06 Jan 2021 07:01  -  07 Jan 2021 07:00  --------------------------------------------------------  IN: 0 mL / OUT: 1450 mL / NET: -1450 mL      Drug Dosing Weight  SHASTA MCKINLEY      PHYSICAL EXAM:  General: Appears well developed, well nourished alert and cooperative.  HEENT: Head; normocephalic, atraumatic.  Eyes: Pupils reactive, cornea wnl.  Neck: Supple, no nodes adenopathy, no NVD or carotid bruit or thyromegaly.  CARDIOVASCULAR: Normal S1 and S2, No murmur, rub, gallop or lift.   LUNGS: No rales, rhonchi or wheeze. Normal breath sounds bilaterally.  ABDOMEN: Soft, nontender without mass or organomegaly. bowel sounds normoactive.  EXTREMITIES: No clubbing, cyanosis or edema. Distal pulses wnl.   SKIN: warm and dry with normal turgor.  NEURO: Alert/oriented x 3/normal motor exam. No pathologic reflexes.    PSYCH: normal affect.        LABS:                        12.7   6.88  )-----------( 157      ( 07 Jan 2021 09:52 )             39.1     01-06    142  |  100  |  31.0<H>  ----------------------------<  173<H>  3.7   |  31.0<H>  |  1.24    Ca    8.9      06 Jan 2021 07:59  Phos  4.8     01-06  Mg     1.8     01-06    TPro  6.5<L>  /  Alb  2.8<L>  /  TBili  0.6  /  DBili  x   /  AST  24  /  ALT  7   /  AlkPhos  90  01-06    SHASTA MCKINLEY      PT/INR - ( 06 Jan 2021 07:59 )   PT: 14.0 sec;   INR: 1.22 ratio         65y Male with prior history of Ischemic DCM with severe biventricular dysfunction, HFrEF with volume overload, LVEF 20%, Mod not severe MR, Mod pul HTN, s/p remote CABG, s/p Cx PCI, DM    CAD  - Continue ASA with Brilinta    Acute on chronic HFrEF  - Continue Entresto with Coreg  - Change to lasix 40 mg po daily on d/c   - LifeVest on d/c  -pt stbale for d/c home today from a cardiac perspective once lifevest fitted.     HTN  - BP controlled    Dyslipidemia  - Continue statin      Thank you for allowing [facility] to participate in the care of this patient.  Please feel free to call with any questions or concerns.

## 2021-01-08 VITALS
SYSTOLIC BLOOD PRESSURE: 91 MMHG | TEMPERATURE: 98 F | HEART RATE: 72 BPM | RESPIRATION RATE: 18 BRPM | DIASTOLIC BLOOD PRESSURE: 61 MMHG | OXYGEN SATURATION: 93 %

## 2021-01-08 LAB
ANION GAP SERPL CALC-SCNC: 12 MMOL/L — SIGNIFICANT CHANGE UP (ref 5–17)
BUN SERPL-MCNC: 36 MG/DL — HIGH (ref 8–20)
CALCIUM SERPL-MCNC: 8.9 MG/DL — SIGNIFICANT CHANGE UP (ref 8.6–10.2)
CHLORIDE SERPL-SCNC: 95 MMOL/L — LOW (ref 98–107)
CO2 SERPL-SCNC: 35 MMOL/L — HIGH (ref 22–29)
CREAT SERPL-MCNC: 1.44 MG/DL — HIGH (ref 0.5–1.3)
GLUCOSE BLDC GLUCOMTR-MCNC: 174 MG/DL — HIGH (ref 70–99)
GLUCOSE BLDC GLUCOMTR-MCNC: 97 MG/DL — SIGNIFICANT CHANGE UP (ref 70–99)
GLUCOSE SERPL-MCNC: 182 MG/DL — HIGH (ref 70–99)
POTASSIUM SERPL-MCNC: 4 MMOL/L — SIGNIFICANT CHANGE UP (ref 3.5–5.3)
POTASSIUM SERPL-SCNC: 4 MMOL/L — SIGNIFICANT CHANGE UP (ref 3.5–5.3)
SODIUM SERPL-SCNC: 142 MMOL/L — SIGNIFICANT CHANGE UP (ref 135–145)

## 2021-01-08 PROCEDURE — 99238 HOSP IP/OBS DSCHRG MGMT 30/<: CPT

## 2021-01-08 RX ADMIN — SACUBITRIL AND VALSARTAN 1 TABLET(S): 24; 26 TABLET, FILM COATED ORAL at 04:24

## 2021-01-08 RX ADMIN — CARVEDILOL PHOSPHATE 12.5 MILLIGRAM(S): 80 CAPSULE, EXTENDED RELEASE ORAL at 04:24

## 2021-01-08 RX ADMIN — GABAPENTIN 100 MILLIGRAM(S): 400 CAPSULE ORAL at 04:24

## 2021-01-08 RX ADMIN — GABAPENTIN 100 MILLIGRAM(S): 400 CAPSULE ORAL at 12:46

## 2021-01-08 RX ADMIN — Medication 2: at 12:43

## 2021-01-08 RX ADMIN — Medication 80 MILLIGRAM(S): at 04:25

## 2021-01-08 RX ADMIN — Medication 81 MILLIGRAM(S): at 10:40

## 2021-01-08 RX ADMIN — TICAGRELOR 90 MILLIGRAM(S): 90 TABLET ORAL at 04:24

## 2021-01-08 RX ADMIN — HEPARIN SODIUM 5000 UNIT(S): 5000 INJECTION INTRAVENOUS; SUBCUTANEOUS at 04:24

## 2021-01-08 NOTE — PROGRESS NOTE ADULT - ASSESSMENT
66 y/o M with Hx of HTN, HLD, DM, CAD s/p cardiac cath 4/14/10 with confirmed LAD occlusion now s/p single-vessel CABG LIMA to LAD 4/2010 with Dr. Lafleur, cardiomyopathy with CHF with recent admission to [facility] for systolic heart failure and started on Entresto and lasix 20mg BID, Pt underwent NST as out patient which showed global hypokinesis of the remaining wall segments and small reversible defect of mild intensity of the inferoseptal wall consistent with per-infarct ischemia, she was having worsening SOB, he came in yesterday for left heart catheterization to assess progression of CAD,  and possible cause of cardiomyopathy, patient is s/p LHC via right groin approach  previous bypass NUNN to LAD patent, s/p One ELIZ to Circumflex  done RFV/RFA, he was noted to have Severe LV dysfunction w/ moderate-severe MR and had HUSEYIN done today showed EF of 20 to 25%, Multiple left ventricular regional wall motion abnormalities exist, Severely reduced RV systolic function, Severely enlarged LA with SEC in LA and LAAA w/o thrombus, Moderate pleural effusion in the left lateral region, his chest Xray revealed pulmonary edema, cardiology requested for the admission for further diuresis.      Acute on chronic systolic CHF:   continue with Entresto, carvedilol  continue lasix 80mg IV BID, monitoring electrolytes  cardiology following  switch to 40mg PO BID on discharge  Lifevest on d/c as per cards    bilateral pleural effusion 2/2 CHF  mild improvement on CXR  CT surgery signed off  increased lasix IV, to go home on 40mg PO BID  wean O2    CAD s/p CABG  -s/p cath this admission, one ELIZ to Circumflex  -continue with Aspirin 81mg and Brilinta,   -continue atorvastatin 40mg, lipid panel unremarkable    HTN  -continue carvedilol 12.5mg BID with holding parameters, will monitor BP closely.     HLD: continue with statin, lipid panel unremarkable    DM:  -A1c 10%  -continue home lantus and insulin

## 2021-01-08 NOTE — PROGRESS NOTE ADULT - SUBJECTIVE AND OBJECTIVE BOX
Patient is a 65y old  Male who presents with a chief complaint of CHF (07 Jan 2021 10:05)      INTERVAL HPI/OVERNIGHT EVENTS:      MEDICATIONS  (STANDING):  aspirin enteric coated 81 milliGRAM(s) Oral daily  atorvastatin 40 milliGRAM(s) Oral at bedtime  carvedilol 12.5 milliGRAM(s) Oral every 12 hours  dextrose 40% Gel 15 Gram(s) Oral once  dextrose 5%. 1000 milliLiter(s) (50 mL/Hr) IV Continuous <Continuous>  dextrose 5%. 1000 milliLiter(s) (100 mL/Hr) IV Continuous <Continuous>  dextrose 50% Injectable 25 Gram(s) IV Push once  dextrose 50% Injectable 12.5 Gram(s) IV Push once  dextrose 50% Injectable 25 Gram(s) IV Push once  furosemide   Injectable 80 milliGRAM(s) IV Push every 12 hours  gabapentin 100 milliGRAM(s) Oral three times a day  glucagon  Injectable 1 milliGRAM(s) IntraMuscular once  heparin   Injectable 5000 Unit(s) SubCutaneous every 12 hours  insulin glargine Injectable (LANTUS) 10 Unit(s) SubCutaneous at bedtime  insulin lispro (ADMELOG) corrective regimen sliding scale   SubCutaneous three times a day before meals  sacubitril 49 mG/valsartan 51 mG 1 Tablet(s) Oral two times a day  ticagrelor 90 milliGRAM(s) Oral every 12 hours    MEDICATIONS  (PRN):  acetaminophen   Tablet .. 650 milliGRAM(s) Oral every 8 hours PRN Moderate Pain (4 - 6)      Allergies    Toprol-XL (Faint; Other)    Intolerances        REVIEW OF SYSTEMS:  CONSTITUTIONAL: No fever, weight loss, or fatigue  EYES: No eye pain, visual disturbances, or discharge  ENMT:  No difficulty hearing, tinnitus, vertigo; No sinus or throat pain  NECK: No pain or stiffness  RESPIRATORY: No cough, wheezing, chills or hemoptysis; No shortness of breath  CARDIOVASCULAR: No chest pain, palpitations, or lightheadedness  GASTROINTESTINAL: No abdominal or epigastric pain. No nausea, vomiting, or hematemesis; No diarrhea or constipation. No melena or hematochezia.  GENITOURINARY: No dysuria, frequency, hematuria, or incontinence  NEUROLOGICAL: No headaches, vertigo, memory loss, loss of strength, numbness, or tremors  SKIN: No itching, burning, rashes, or lesions   LYMPH NODES: No enlarged glands  ENDOCRINE: No heat or cold intolerance; No hair loss; No polydipsia or polyuria  MUSCULOSKELETAL: No back pain  PSYCHIATRIC: No depression, anxiety, or mood swings  HEME/LYMPH: No easy bruising, or bleeding gums  ALLERGY AND IMMUNOLOGIC: No hives or eczema    PHYSICAL EXAM:  GENERAL: NAD, well-groomed, well-developed  HEAD:  Atraumatic, Normocephalic  EYES: EOMI, PERRLA, conjunctiva and sclera clear  ENMT: Moist mucous membranes, Good dentition   NECK: Supple  NERVOUS SYSTEM:  Alert & Oriented X3, Good concentration; Bilateral LE mobile, sensation to light touch intact  CHEST/LUNG: Diminished breath sounds to auscultation bilaterally; No rales, rhonchi, wheezing, or rubs appreciated  HEART: Regular rate and rhythm; No murmurs, rubs, or gallops  ABDOMEN: Soft, Nontender, Nondistended; Bowel sounds present  EXTREMITIES:  2+ Peripheral Pulses, No clubbing or cyanosis  SKIN: No rashes or lesions    Vital Signs Last 24 Hrs  T(C): 36.9 (08 Jan 2021 15:52), Max: 36.9 (08 Jan 2021 15:52)  T(F): 98.5 (08 Jan 2021 15:52), Max: 98.5 (08 Jan 2021 15:52)  HR: 72 (08 Jan 2021 15:52) (65 - 72)  BP: 91/61 (08 Jan 2021 15:52) (91/61 - 126/75)  BP(mean): --  RR: 18 (08 Jan 2021 15:52) (16 - 18)  SpO2: 93% (08 Jan 2021 15:52) (93% - 100%)    LABS:    08 Jan 2021 14:12    142    |  95     |  36.0   ----------------------------<  182    4.0     |  35.0   |  1.44     Ca    8.9        08 Jan 2021 14:12          CAPILLARY BLOOD GLUCOSE      POCT Blood Glucose.: 174 mg/dL (08 Jan 2021 12:08)  POCT Blood Glucose.: 97 mg/dL (08 Jan 2021 07:56)           [x] Consultant(s) Notes Reviewed

## 2021-01-08 NOTE — PROGRESS NOTE ADULT - REASON FOR ADMISSION
CHF
Left Heart Catheterization secondary to progressive dyspnea; pt with hx of CAD, CABG x1 vessel
CHF

## 2021-01-08 NOTE — DISCHARGE NOTE NURSING/CASE MANAGEMENT/SOCIAL WORK - PATIENT PORTAL LINK FT
You can access the FollowMyHealth Patient Portal offered by [facility] by registering at the following website: [email or web address]. By joining [employer]’s FollowMyHealth portal, you will also be able to view your health information using other applications (apps) compatible with our system.

## 2021-01-20 ENCOUNTER — INPATIENT (INPATIENT)
Facility: HOSPITAL | Age: 66
LOS: 4 days | Discharge: HOME | DRG: 227 | End: 2021-01-25
Attending: STUDENT IN AN ORGANIZED HEALTH CARE EDUCATION/TRAINING PROGRAM | Admitting: HOSPITALIST
Payer: MEDICARE

## 2021-01-20 VITALS
SYSTOLIC BLOOD PRESSURE: 101 MMHG | OXYGEN SATURATION: 97 % | RESPIRATION RATE: 18 BRPM | HEART RATE: 86 BPM | TEMPERATURE: 98 F | DIASTOLIC BLOOD PRESSURE: 65 MMHG | HEIGHT: 63 IN

## 2021-01-20 DIAGNOSIS — R55 SYNCOPE AND COLLAPSE: ICD-10-CM

## 2021-01-20 DIAGNOSIS — Z95.1 PRESENCE OF AORTOCORONARY BYPASS GRAFT: Chronic | ICD-10-CM

## 2021-01-20 LAB
ALBUMIN SERPL ELPH-MCNC: 3.1 G/DL — LOW (ref 3.3–5.2)
ALP SERPL-CCNC: 115 U/L — SIGNIFICANT CHANGE UP (ref 40–120)
ALT FLD-CCNC: 17 U/L — SIGNIFICANT CHANGE UP
ANION GAP SERPL CALC-SCNC: 10 MMOL/L — SIGNIFICANT CHANGE UP (ref 5–17)
APTT BLD: 33.3 SEC — SIGNIFICANT CHANGE UP (ref 27.5–35.5)
AST SERPL-CCNC: 29 U/L — SIGNIFICANT CHANGE UP
BASOPHILS # BLD AUTO: 0.06 K/UL — SIGNIFICANT CHANGE UP (ref 0–0.2)
BASOPHILS NFR BLD AUTO: 0.8 % — SIGNIFICANT CHANGE UP (ref 0–2)
BILIRUB SERPL-MCNC: 0.5 MG/DL — SIGNIFICANT CHANGE UP (ref 0.4–2)
BUN SERPL-MCNC: 20 MG/DL — SIGNIFICANT CHANGE UP (ref 8–20)
CALCIUM SERPL-MCNC: 9 MG/DL — SIGNIFICANT CHANGE UP (ref 8.6–10.2)
CHLORIDE SERPL-SCNC: 96 MMOL/L — LOW (ref 98–107)
CO2 SERPL-SCNC: 33 MMOL/L — HIGH (ref 22–29)
CREAT SERPL-MCNC: 1.16 MG/DL — SIGNIFICANT CHANGE UP (ref 0.5–1.3)
EOSINOPHIL # BLD AUTO: 0.14 K/UL — SIGNIFICANT CHANGE UP (ref 0–0.5)
EOSINOPHIL NFR BLD AUTO: 1.8 % — SIGNIFICANT CHANGE UP (ref 0–6)
GLUCOSE SERPL-MCNC: 97 MG/DL — SIGNIFICANT CHANGE UP (ref 70–99)
HCT VFR BLD CALC: 41.8 % — SIGNIFICANT CHANGE UP (ref 39–50)
HGB BLD-MCNC: 13.8 G/DL — SIGNIFICANT CHANGE UP (ref 13–17)
IMM GRANULOCYTES NFR BLD AUTO: 0.3 % — SIGNIFICANT CHANGE UP (ref 0–1.5)
INR BLD: 1.25 RATIO — HIGH (ref 0.88–1.16)
LYMPHOCYTES # BLD AUTO: 2.61 K/UL — SIGNIFICANT CHANGE UP (ref 1–3.3)
LYMPHOCYTES # BLD AUTO: 33.9 % — SIGNIFICANT CHANGE UP (ref 13–44)
MAGNESIUM SERPL-MCNC: 1.9 MG/DL — SIGNIFICANT CHANGE UP (ref 1.6–2.6)
MCHC RBC-ENTMCNC: 28 PG — SIGNIFICANT CHANGE UP (ref 27–34)
MCHC RBC-ENTMCNC: 33 GM/DL — SIGNIFICANT CHANGE UP (ref 32–36)
MCV RBC AUTO: 85 FL — SIGNIFICANT CHANGE UP (ref 80–100)
MONOCYTES # BLD AUTO: 1.07 K/UL — HIGH (ref 0–0.9)
MONOCYTES NFR BLD AUTO: 13.9 % — SIGNIFICANT CHANGE UP (ref 2–14)
NEUTROPHILS # BLD AUTO: 3.81 K/UL — SIGNIFICANT CHANGE UP (ref 1.8–7.4)
NEUTROPHILS NFR BLD AUTO: 49.3 % — SIGNIFICANT CHANGE UP (ref 43–77)
PLATELET # BLD AUTO: 226 K/UL — SIGNIFICANT CHANGE UP (ref 150–400)
POTASSIUM SERPL-MCNC: 3.7 MMOL/L — SIGNIFICANT CHANGE UP (ref 3.5–5.3)
POTASSIUM SERPL-SCNC: 3.7 MMOL/L — SIGNIFICANT CHANGE UP (ref 3.5–5.3)
PROT SERPL-MCNC: 7.6 G/DL — SIGNIFICANT CHANGE UP (ref 6.6–8.7)
PROTHROM AB SERPL-ACNC: 14.4 SEC — HIGH (ref 10.6–13.6)
RBC # BLD: 4.92 M/UL — SIGNIFICANT CHANGE UP (ref 4.2–5.8)
RBC # FLD: 14.1 % — SIGNIFICANT CHANGE UP (ref 10.3–14.5)
SODIUM SERPL-SCNC: 139 MMOL/L — SIGNIFICANT CHANGE UP (ref 135–145)
TROPONIN T SERPL-MCNC: 0.05 NG/ML — SIGNIFICANT CHANGE UP (ref 0–0.06)
WBC # BLD: 7.71 K/UL — SIGNIFICANT CHANGE UP (ref 3.8–10.5)
WBC # FLD AUTO: 7.71 K/UL — SIGNIFICANT CHANGE UP (ref 3.8–10.5)

## 2021-01-20 PROCEDURE — 99285 EMERGENCY DEPT VISIT HI MDM: CPT

## 2021-01-20 PROCEDURE — 71045 X-RAY EXAM CHEST 1 VIEW: CPT | Mod: 26

## 2021-01-20 PROCEDURE — 70450 CT HEAD/BRAIN W/O DYE: CPT | Mod: 26

## 2021-01-20 PROCEDURE — 93010 ELECTROCARDIOGRAM REPORT: CPT

## 2021-01-20 NOTE — ED ADULT NURSE NOTE - CHIEF COMPLAINT QUOTE
per EMS, Patient was with his wife when he suddenly became dizzy and slumped over in a chair.  He never lost consciousness but was unable to speak clearly.  Orthostatic hypotension prior to arrival.  Symptoms resolved enroute to hospital.  Pt arrives alert and oriented X 4.  Has life vest on. finger stick 101 on scene. Code stroke called

## 2021-01-20 NOTE — ED ADULT TRIAGE NOTE - CHIEF COMPLAINT QUOTE
per EMS, Patient was with his wife when he suddenly became dizzy and slumped over in a chair.  He never lost consciousness but was unable to speak clearly.  Orthostatic hypotension prior to arrival.  Symptoms resolved enroute to hospital.  Pt arrives alert and oriented X 4.  Has life vest on. finger stick 101 on scene. Code stroke called per EMS, Patient was with his wife when he suddenly became dizzy walked over to his chair, sat down, and slumped over.  He never lost consciousness but was "unable to speak clearly" according to his wife.  Orthostatic hypotension prior to arrival.  Symptoms resolved enroute to hospital.  Pt arrives alert and oriented X 4.  Has life vest on. finger stick 101 on scene. Code stroke called

## 2021-01-20 NOTE — ED ADULT NURSE NOTE - OBJECTIVE STATEMENT
Pt in no apparent distress at this time. Airway patent, breathing spontaneous and nonlabored. Pt A&Ox3 resting in stretcher. Pt c/o syncopal episode at home. States he was walking ot the bathroom when he became lightheaded and had to sit down. As per EMS, witnesses state that pt had slurred speech and became unresponsive. Pt wearing life vest, states last week they were going to place ICD but gave him life vest instead. Pt denies any weakness, dizziness or blurry vision. At this time pt has no neuro deficits. Placed on cardiac monitor and .

## 2021-01-20 NOTE — ED PROVIDER NOTE - OBJECTIVE STATEMENT
64 y/o male with PMHx of Diabetes, HTN, and CAD, s/p CABG, with a life vest, CHF, who presents with syncopal episode today. Patient states he stood up very quickly, felt lightheaded and felt like he was going to pass out. He sat down in the chair and per bystanders the patient began to slur his speech and became unresponsive. By the time EMS arrived, he was awake, alert, with no focal neurological deficits. Patient states he is currently feeling very well. Denies chest pain, SOB, palpitations, numbness, weakness, tingling, or facial droop, at this time or at the time of episode. 64 y/o male with PMHx of Diabetes, HTN, and CAD, s/p CABG, with a life vest, CHF EF <20%, who presents with syncopal episode today. Patient states he was speaking with his wife in the kitchen when he suddenly, felt lightheaded and felt like he was going to pass out. He sat down in the chair and per bystanders the patient began to slur his speech and became unresponsive. By the time EMS arrived, he was awake, alert, with no focal neurological deficits. Patient states he is currently feeling very well. Denies chest pain, SOB, palpitations, numbness, weakness, tingling, or facial droop, at this time or at the time of episode.

## 2021-01-20 NOTE — ED PROVIDER NOTE - CLINICAL SUMMARY MEDICAL DECISION MAKING FREE TEXT BOX
pt with significant cardiac Hx and unprovoked syncope concerning for cardiac etiology. Will admit for tele and cardio eval

## 2021-01-21 ENCOUNTER — TRANSCRIPTION ENCOUNTER (OUTPATIENT)
Age: 66
End: 2021-01-21

## 2021-01-21 DIAGNOSIS — G45.9 TRANSIENT CEREBRAL ISCHEMIC ATTACK, UNSPECIFIED: ICD-10-CM

## 2021-01-21 DIAGNOSIS — I25.810 ATHEROSCLEROSIS OF CORONARY ARTERY BYPASS GRAFT(S) WITHOUT ANGINA PECTORIS: ICD-10-CM

## 2021-01-21 DIAGNOSIS — R55 SYNCOPE AND COLLAPSE: ICD-10-CM

## 2021-01-21 DIAGNOSIS — I25.5 ISCHEMIC CARDIOMYOPATHY: ICD-10-CM

## 2021-01-21 DIAGNOSIS — E11.9 TYPE 2 DIABETES MELLITUS WITHOUT COMPLICATIONS: ICD-10-CM

## 2021-01-21 PROBLEM — R94.39 ABNORMAL RESULT OF OTHER CARDIOVASCULAR FUNCTION STUDY: Chronic | Status: ACTIVE | Noted: 2020-12-31

## 2021-01-21 LAB
A1C WITH ESTIMATED AVERAGE GLUCOSE RESULT: 10.4 % — HIGH (ref 4–5.6)
ANION GAP SERPL CALC-SCNC: 12 MMOL/L — SIGNIFICANT CHANGE UP (ref 5–17)
BUN SERPL-MCNC: 22 MG/DL — HIGH (ref 8–20)
CALCIUM SERPL-MCNC: 8.6 MG/DL — SIGNIFICANT CHANGE UP (ref 8.6–10.2)
CHLORIDE SERPL-SCNC: 99 MMOL/L — SIGNIFICANT CHANGE UP (ref 98–107)
CO2 SERPL-SCNC: 31 MMOL/L — HIGH (ref 22–29)
CREAT SERPL-MCNC: 1.23 MG/DL — SIGNIFICANT CHANGE UP (ref 0.5–1.3)
ESTIMATED AVERAGE GLUCOSE: 252 MG/DL — HIGH (ref 68–114)
GLUCOSE BLDC GLUCOMTR-MCNC: 165 MG/DL — HIGH (ref 70–99)
GLUCOSE BLDC GLUCOMTR-MCNC: 176 MG/DL — HIGH (ref 70–99)
GLUCOSE BLDC GLUCOMTR-MCNC: 182 MG/DL — HIGH (ref 70–99)
GLUCOSE BLDC GLUCOMTR-MCNC: 238 MG/DL — HIGH (ref 70–99)
GLUCOSE SERPL-MCNC: 186 MG/DL — HIGH (ref 70–99)
MAGNESIUM SERPL-MCNC: 1.9 MG/DL — SIGNIFICANT CHANGE UP (ref 1.6–2.6)
POTASSIUM SERPL-MCNC: 3.6 MMOL/L — SIGNIFICANT CHANGE UP (ref 3.5–5.3)
POTASSIUM SERPL-SCNC: 3.6 MMOL/L — SIGNIFICANT CHANGE UP (ref 3.5–5.3)
RAPID RVP RESULT: SIGNIFICANT CHANGE UP
SARS-COV-2 IGG SERPL QL IA: NEGATIVE — SIGNIFICANT CHANGE UP
SARS-COV-2 IGM SERPL IA-ACNC: 0.07 INDEX — SIGNIFICANT CHANGE UP
SARS-COV-2 RNA SPEC QL NAA+PROBE: SIGNIFICANT CHANGE UP
SODIUM SERPL-SCNC: 141 MMOL/L — SIGNIFICANT CHANGE UP (ref 135–145)
TROPONIN T SERPL-MCNC: 0.04 NG/ML — SIGNIFICANT CHANGE UP (ref 0–0.06)
TROPONIN T SERPL-MCNC: 0.05 NG/ML — SIGNIFICANT CHANGE UP (ref 0–0.06)

## 2021-01-21 PROCEDURE — 86803 HEPATITIS C AB TEST: CPT

## 2021-01-21 PROCEDURE — 80048 BASIC METABOLIC PNL TOTAL CA: CPT

## 2021-01-21 PROCEDURE — 86850 RBC ANTIBODY SCREEN: CPT

## 2021-01-21 PROCEDURE — 86901 BLOOD TYPING SEROLOGIC RH(D): CPT

## 2021-01-21 PROCEDURE — 83036 HEMOGLOBIN GLYCOSYLATED A1C: CPT

## 2021-01-21 PROCEDURE — 92978 ENDOLUMINL IVUS OCT C 1ST: CPT | Mod: LC

## 2021-01-21 PROCEDURE — 71045 X-RAY EXAM CHEST 1 VIEW: CPT

## 2021-01-21 PROCEDURE — 85610 PROTHROMBIN TIME: CPT

## 2021-01-21 PROCEDURE — C1889: CPT

## 2021-01-21 PROCEDURE — 99222 1ST HOSP IP/OBS MODERATE 55: CPT

## 2021-01-21 PROCEDURE — C1894: CPT

## 2021-01-21 PROCEDURE — 85730 THROMBOPLASTIN TIME PARTIAL: CPT

## 2021-01-21 PROCEDURE — C1753: CPT

## 2021-01-21 PROCEDURE — 83880 ASSAY OF NATRIURETIC PEPTIDE: CPT

## 2021-01-21 PROCEDURE — 12345: CPT | Mod: NC

## 2021-01-21 PROCEDURE — C1769: CPT

## 2021-01-21 PROCEDURE — 84100 ASSAY OF PHOSPHORUS: CPT

## 2021-01-21 PROCEDURE — 93005 ELECTROCARDIOGRAM TRACING: CPT

## 2021-01-21 PROCEDURE — 93312 ECHO TRANSESOPHAGEAL: CPT

## 2021-01-21 PROCEDURE — 84443 ASSAY THYROID STIM HORMONE: CPT

## 2021-01-21 PROCEDURE — C1874: CPT

## 2021-01-21 PROCEDURE — 82962 GLUCOSE BLOOD TEST: CPT

## 2021-01-21 PROCEDURE — 80053 COMPREHEN METABOLIC PANEL: CPT

## 2021-01-21 PROCEDURE — C9600: CPT | Mod: LC

## 2021-01-21 PROCEDURE — 83735 ASSAY OF MAGNESIUM: CPT

## 2021-01-21 PROCEDURE — 80061 LIPID PANEL: CPT

## 2021-01-21 PROCEDURE — 97163 PT EVAL HIGH COMPLEX 45 MIN: CPT

## 2021-01-21 PROCEDURE — 85027 COMPLETE CBC AUTOMATED: CPT

## 2021-01-21 PROCEDURE — 85025 COMPLETE CBC W/AUTO DIFF WBC: CPT

## 2021-01-21 PROCEDURE — 93880 EXTRACRANIAL BILAT STUDY: CPT | Mod: 26

## 2021-01-21 PROCEDURE — 93325 DOPPLER ECHO COLOR FLOW MAPG: CPT

## 2021-01-21 PROCEDURE — 36415 COLL VENOUS BLD VENIPUNCTURE: CPT

## 2021-01-21 PROCEDURE — 93320 DOPPLER ECHO COMPLETE: CPT

## 2021-01-21 PROCEDURE — 93461 R&L HRT ART/VENTRICLE ANGIO: CPT | Mod: XU

## 2021-01-21 PROCEDURE — 86900 BLOOD TYPING SEROLOGIC ABO: CPT

## 2021-01-21 PROCEDURE — C1760: CPT

## 2021-01-21 PROCEDURE — C1887: CPT

## 2021-01-21 PROCEDURE — C1725: CPT

## 2021-01-21 RX ORDER — INSULIN GLARGINE 100 [IU]/ML
5 INJECTION, SOLUTION SUBCUTANEOUS AT BEDTIME
Refills: 0 | Status: DISCONTINUED | OUTPATIENT
Start: 2021-01-21 | End: 2021-01-21

## 2021-01-21 RX ORDER — CARVEDILOL PHOSPHATE 80 MG/1
1 CAPSULE, EXTENDED RELEASE ORAL
Qty: 60 | Refills: 0
Start: 2021-01-21 | End: 2021-02-19

## 2021-01-21 RX ORDER — GABAPENTIN 400 MG/1
100 CAPSULE ORAL THREE TIMES A DAY
Refills: 0 | Status: DISCONTINUED | OUTPATIENT
Start: 2021-01-21 | End: 2021-01-25

## 2021-01-21 RX ORDER — ASPIRIN/CALCIUM CARB/MAGNESIUM 324 MG
81 TABLET ORAL DAILY
Refills: 0 | Status: DISCONTINUED | OUTPATIENT
Start: 2021-01-21 | End: 2021-01-25

## 2021-01-21 RX ORDER — HEPARIN SODIUM 5000 [USP'U]/ML
5000 INJECTION INTRAVENOUS; SUBCUTANEOUS EVERY 12 HOURS
Refills: 0 | Status: DISCONTINUED | OUTPATIENT
Start: 2021-01-21 | End: 2021-01-25

## 2021-01-21 RX ORDER — DEXTROSE 50 % IN WATER 50 %
25 SYRINGE (ML) INTRAVENOUS ONCE
Refills: 0 | Status: DISCONTINUED | OUTPATIENT
Start: 2021-01-21 | End: 2021-01-25

## 2021-01-21 RX ORDER — SACUBITRIL AND VALSARTAN 24; 26 MG/1; MG/1
1 TABLET, FILM COATED ORAL
Refills: 0 | Status: DISCONTINUED | OUTPATIENT
Start: 2021-01-21 | End: 2021-01-22

## 2021-01-21 RX ORDER — SODIUM CHLORIDE 9 MG/ML
1000 INJECTION, SOLUTION INTRAVENOUS
Refills: 0 | Status: DISCONTINUED | OUTPATIENT
Start: 2021-01-21 | End: 2021-01-25

## 2021-01-21 RX ORDER — INSULIN LISPRO 100/ML
VIAL (ML) SUBCUTANEOUS AT BEDTIME
Refills: 0 | Status: DISCONTINUED | OUTPATIENT
Start: 2021-01-21 | End: 2021-01-25

## 2021-01-21 RX ORDER — CARVEDILOL PHOSPHATE 80 MG/1
1 CAPSULE, EXTENDED RELEASE ORAL
Qty: 0 | Refills: 0 | DISCHARGE

## 2021-01-21 RX ORDER — GLUCAGON INJECTION, SOLUTION 0.5 MG/.1ML
1 INJECTION, SOLUTION SUBCUTANEOUS ONCE
Refills: 0 | Status: DISCONTINUED | OUTPATIENT
Start: 2021-01-21 | End: 2021-01-25

## 2021-01-21 RX ORDER — DEXTROSE 50 % IN WATER 50 %
12.5 SYRINGE (ML) INTRAVENOUS ONCE
Refills: 0 | Status: DISCONTINUED | OUTPATIENT
Start: 2021-01-21 | End: 2021-01-25

## 2021-01-21 RX ORDER — FUROSEMIDE 40 MG
40 TABLET ORAL DAILY
Refills: 0 | Status: DISCONTINUED | OUTPATIENT
Start: 2021-01-21 | End: 2021-01-22

## 2021-01-21 RX ORDER — ATORVASTATIN CALCIUM 80 MG/1
40 TABLET, FILM COATED ORAL AT BEDTIME
Refills: 0 | Status: DISCONTINUED | OUTPATIENT
Start: 2021-01-21 | End: 2021-01-25

## 2021-01-21 RX ORDER — INSULIN LISPRO 100/ML
VIAL (ML) SUBCUTANEOUS
Refills: 0 | Status: DISCONTINUED | OUTPATIENT
Start: 2021-01-21 | End: 2021-01-25

## 2021-01-21 RX ORDER — CARVEDILOL PHOSPHATE 80 MG/1
12.5 CAPSULE, EXTENDED RELEASE ORAL EVERY 12 HOURS
Refills: 0 | Status: DISCONTINUED | OUTPATIENT
Start: 2021-01-21 | End: 2021-01-23

## 2021-01-21 RX ORDER — TICAGRELOR 90 MG/1
90 TABLET ORAL EVERY 12 HOURS
Refills: 0 | Status: DISCONTINUED | OUTPATIENT
Start: 2021-01-21 | End: 2021-01-25

## 2021-01-21 RX ORDER — DEXTROSE 50 % IN WATER 50 %
15 SYRINGE (ML) INTRAVENOUS ONCE
Refills: 0 | Status: DISCONTINUED | OUTPATIENT
Start: 2021-01-21 | End: 2021-01-25

## 2021-01-21 RX ORDER — INSULIN GLARGINE 100 [IU]/ML
20 INJECTION, SOLUTION SUBCUTANEOUS AT BEDTIME
Refills: 0 | Status: DISCONTINUED | OUTPATIENT
Start: 2021-01-21 | End: 2021-01-25

## 2021-01-21 RX ORDER — ACETAMINOPHEN 500 MG
650 TABLET ORAL EVERY 6 HOURS
Refills: 0 | Status: DISCONTINUED | OUTPATIENT
Start: 2021-01-21 | End: 2021-01-25

## 2021-01-21 RX ADMIN — TICAGRELOR 90 MILLIGRAM(S): 90 TABLET ORAL at 15:58

## 2021-01-21 RX ADMIN — HEPARIN SODIUM 5000 UNIT(S): 5000 INJECTION INTRAVENOUS; SUBCUTANEOUS at 15:57

## 2021-01-21 RX ADMIN — ATORVASTATIN CALCIUM 40 MILLIGRAM(S): 80 TABLET, FILM COATED ORAL at 20:37

## 2021-01-21 RX ADMIN — CARVEDILOL PHOSPHATE 12.5 MILLIGRAM(S): 80 CAPSULE, EXTENDED RELEASE ORAL at 05:41

## 2021-01-21 RX ADMIN — Medication 81 MILLIGRAM(S): at 07:44

## 2021-01-21 RX ADMIN — SACUBITRIL AND VALSARTAN 1 TABLET(S): 24; 26 TABLET, FILM COATED ORAL at 05:41

## 2021-01-21 RX ADMIN — GABAPENTIN 100 MILLIGRAM(S): 400 CAPSULE ORAL at 20:37

## 2021-01-21 RX ADMIN — TICAGRELOR 90 MILLIGRAM(S): 90 TABLET ORAL at 05:41

## 2021-01-21 RX ADMIN — GABAPENTIN 100 MILLIGRAM(S): 400 CAPSULE ORAL at 15:47

## 2021-01-21 RX ADMIN — Medication 40 MILLIGRAM(S): at 05:44

## 2021-01-21 RX ADMIN — GABAPENTIN 100 MILLIGRAM(S): 400 CAPSULE ORAL at 05:44

## 2021-01-21 RX ADMIN — INSULIN GLARGINE 20 UNIT(S): 100 INJECTION, SOLUTION SUBCUTANEOUS at 21:00

## 2021-01-21 RX ADMIN — Medication 4: at 11:50

## 2021-01-21 RX ADMIN — HEPARIN SODIUM 5000 UNIT(S): 5000 INJECTION INTRAVENOUS; SUBCUTANEOUS at 05:44

## 2021-01-21 NOTE — H&P ADULT - PROBLEM SELECTOR PLAN 1
possible orthostatic, vaso vagal, will order orthostatic vitals, monitor on tele, r/o arrythmia , will get carotid doppler, cardio consult [place].

## 2021-01-21 NOTE — DISCHARGE NOTE PROVIDER - NSDCCPCAREPLAN_GEN_ALL_CORE_FT
PRINCIPAL DISCHARGE DIAGNOSIS  Diagnosis: Syncope, unspecified syncope type  Assessment and Plan of Treatment:        PRINCIPAL DISCHARGE DIAGNOSIS  Diagnosis: Syncope, unspecified syncope type  Assessment and Plan of Treatment: -Likely cardiac   - s/p medtronic ICD placement  - c/w Keflex 500mg twice per day for 3 days   - f/u w/ cardiolgy tomorrow 1/25/21

## 2021-01-21 NOTE — H&P ADULT - PROBLEM SELECTOR PLAN 3
will continue Entresto, coreg and lasix, pt. with life vest. last echo was recently on 1/5/21, repeat echo if requested by cardio.

## 2021-01-21 NOTE — H&P ADULT - PROBLEM SELECTOR PLAN 2
As per history pt. was not able to speak clearly at home but in the ER symptoms resolved, will keep on neuro checks, continue aspirin, pt. high risk for neuro event considering very low EF. neurology consult requested,

## 2021-01-21 NOTE — PROGRESS NOTE ADULT - ASSESSMENT
64 y/o male with PMHx of Diabetes, HTN, and CAD, s/p CABG, s/p stent x 1 during early part of the month was admitted at [facility], with a life vest, CHF EF <20%, who presents with near syncopal episode. As per pt. he was sitting with his wife in the living room, stood up to go to the bathroom but felt dizzy and the feeling of passing out but did not pass out , did not hit his head to the floor, went back to sitting position. As per triage note, pt's wife reported that pt. was not able to speak clearly, ambulance was called and triage note says pt. was orthostatic prior to arrival and BG was 101 on scene. Pt. has been aaox3 in the ER, no neurological symptoms, no focal weakness. no cp, no sob, no HA. no abd. pain, no n/v/d. no cough, no fever. no complaints at the time of admission, pt. will be admitted for near syncope.      Problem/Plan - 1:  ·  Problem: Near syncope.  cardio consult appreciated  --> EP consult as per cardio  --> npo pmn      Problem/Plan - 2:  ·  Problem: TIA (transient ischemic attack).  - spoke to neurology, postponed consult   --> asa and statin   -->unlikley to be tia and more of a cardiac event  --> consult neuro tomorrow if you feel its needed  --> will order mri head without contrast     Problem/Plan - 3:  ·  Problem: Ischemic cardiomyopathy.  Plan: will continue Entresto, coreg and lasix, pt. with life vest.   --> plan as per cardio      Problem/Plan - 4:  ·  Problem: Coronary artery disease involving coronary bypass graft of native heart without angina pectoris.  Plan: continue asa, brilinta, had recent stent, trop negative x2, continue coreg.     Problem/Plan - 5:  ·  Problem: Diabetes mellitus.  hba1c elevated  --> ssi   advised better diet and exercise    dvt prop- hep sub q

## 2021-01-21 NOTE — DISCHARGE NOTE PROVIDER - CARE PROVIDER_API CALL
Autumn Garcia)  Internal Medicine  [address] [place] [address] [id number]  Phone: [phone]  Fax: [phone]  Follow Up Time:

## 2021-01-21 NOTE — H&P ADULT - NSHPPHYSICALEXAM_GEN_ALL_CORE
General: Pt. lying in bed not in distress.  HEENT: AT, NC. PERRL. intact EOM. no ear discharge, not dizzy on moving head from side to side in the bed. no throat erythema or exudate.   Neck: supple. no JVD.   Chest: CTA bilaterally, life vest on.  Heart: S1,S2. RRR. no heart murmur. no edema.  Abdomen: soft. non-tender. non-distended. + BS.   Ext:  no calf tenderness on either side, ROM of all ext. intact, distal pulses 2 +.  Neuro: AAO x3. no focal weakness. no speech disorder, Cns ii to xii intact, motor 5/5, sensory intact b/L, reflexes 2 +.   Skin: no obvious rash noted, no pallor, no diaphoresis.   psych : normal affect, no si/hi.

## 2021-01-21 NOTE — SPEECH LANGUAGE PATHOLOGY EVALUATION - SLP DIAGNOSIS
Receptive and expressive language skills WFL, speech intelligibility WFL.  Cognitive linguistic skills upon informal screening WFL.

## 2021-01-21 NOTE — DISCHARGE NOTE PROVIDER - HOSPITAL COURSE
66 y/o male with PMHx of Diabetes, HTN, and CAD, s/p CABG, s/p stent x 1 during early part of the month was admitted at [facility], with a life vest, CHF EF 20 to 25 %. , who presents with near syncopal episode. As per pt. he was sitting with his wife in the living room, stood up to go to the bathroom but felt dizzy and the feeling of passing out but did not pass out , did not hit his head to the floor, went back to sitting position.    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    carotids negative,     time spent on dc 34 minutes    pe  General: Pt. lying in bed not in distress.  HEENT: AT, NC. PERRL. intact EOM. no ear discharge, not dizzy on moving head from side to side in the bed.   Neck: supple. no JVD.   Chest: CTA bilaterally, life vest on.  Heart: S1,S2. RRR. no heart murmur. no edema.  Abdomen: soft. non-tender. non-distended. + BS.   Ext:  no calf tenderness on either side, ROM of all ext. intact, distal pulses 2 +.  Neuro: AAO x3. no focal weakness. no speech disorder, Cns ii to xii intact, motor 5/5, sensory intact b/L, reflexes 2 +.   Skin: no obvious rash noted, no pallor, no diaphoresis.   psych : normal affect, no si/hi.      patient cleared by cardio and is now ready for dc home 64 y/o male with PMHx of Diabetes, HTN, and CAD, s/p CABG, s/p stent x 1 during early part of the month was admitted at [facility], with a life vest, CHF EF 20 to 25 %. , who presents with near syncopal episode. As per pt. he was sitting with his wife in the living room, stood up to go to the bathroom but felt dizzy and the feeling of passing out but did not pass out , did not hit his head to the floor, went back to sitting position.  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   Neuro evaluated and made note that syncope unlikely TIA.  Cardiology was consulted and evaluated pt.  Pt had an episode of NSVT on telemetry.  Given symptoms and EF pt had medtronic single chamber ICD implantation for secondary prevention.  Pt found to have orthostatic hypotension and subsequently entresto and coreg doses decreases.  Pt no longer symptotic and cleared for d/c home by cardiology.  He has had no further events on telemetry.  Per EP recs will d/c pt on Keflex for 3 more days.  Pt will f/u w/ cardiology outpt tomorrow.       Physical Exam:  Vital Signs Last 24 Hrs  T(C): 36.8 (25 Jan 2021 10:38), Max: 37.1 (25 Jan 2021 04:00)  T(F): 98.3 (25 Jan 2021 10:38), Max: 98.7 (25 Jan 2021 04:00)  HR: 77 (25 Jan 2021 10:38) (76 - 88)  BP: 127/73 (25 Jan 2021 10:38) (115/61 - 147/82)  BP(mean): 75 (25 Jan 2021 04:00) (75 - 97)  RR: 18 (25 Jan 2021 10:38) (16 - 18)  SpO2: 98% (25 Jan 2021 10:38) (98% - 100%)      General: Pt. lying in bed not in distress.  HEENT: AT, NC. PERRL. intact EOM. no ear discharge, not dizzy on moving head from side to side in the bed.   Neck: supple. no JVD.   Chest: CTA bilaterally, life vest on.  Heart: S1,S2. RRR. no heart murmur. no edema.  Abdomen: soft. non-tender. non-distended. + BS.   Ext:  no calf tenderness on either side, ROM of all ext. intact, distal pulses 2 +.  Neuro: AAO x3. no focal weakness. no speech disorder, Cns ii to xii intact, motor 5/5, sensory intact b/L, reflexes 2 +.   Skin: no obvious rash noted, no pallor, no diaphoresis.   psych : normal affect, no si/hi.        Time spent on dc 34 minutes.

## 2021-01-21 NOTE — SPEECH LANGUAGE PATHOLOGY EVALUATION - COMMENTS
As per charting, "pt. is a 66 y/o male with PMHx of Diabetes, HTN, and CAD, s/p CABG, s/p stent x 1 during early part of the month was admitted at [facility], with a life vest, CHF EF <20%, who presents with near syncopal episode. As per pt. he was sitting with his wife in the living room, stood up to go to the bathroom but felt dizzy and the feeling of passing out but did not pass out , did not hit his head to the floor, went back to sitting position. As per triage note, pt's wife reported that pt. was not able to speak clearly, ambulance was called and triage note says pt. was orthostatic prior to arrival and BG was 101 on scene. Pt. has been aaox3 in the ER, no neurological symptoms, no focal weakness. no cp, no sob, no HA. no abd. pain, no n/v/d. no cough, no fever. no complaints at the time of admission, pt. will be admitted for near syncopy , concern for TIA."

## 2021-01-21 NOTE — PROGRESS NOTE ADULT - SUBJECTIVE AND OBJECTIVE BOX
SHASTA MCKINLEY    [id number]    65y      Male    INTERVAL HPI/OVERNIGHT EVENTS: patient being seen for possible syncope. Patient seen at bedside and states feeling well    REVIEW OF SYSTEMS:    CONSTITUTIONAL: No fever, weight loss, or fatigue  RESPIRATORY: No cough, wheezing, hemoptysis; No shortness of breath  CARDIOVASCULAR: No chest pain, palpitations  GASTROINTESTINAL: No abdominal or epigastric pain. No nausea, vomiting  NEUROLOGICAL: No headaches, memory loss, loss of strength.  MISCELLANEOUS:      Vital Signs Last 24 Hrs  T(C): 36.4 (21 Jan 2021 15:05), Max: 36.7 (20 Jan 2021 20:53)  T(F): 97.6 (21 Jan 2021 15:05), Max: 98.1 (20 Jan 2021 20:53)  HR: 78 (21 Jan 2021 15:05) (78 - 86)  BP: 132/78 (21 Jan 2021 15:05) (88/54 - 132/78)  BP(mean): --  RR: 20 (21 Jan 2021 15:05) (18 - 20)  SpO2: 98% (21 Jan 2021 15:05) (96% - 98%)    PHYSICAL EXAM:    GENERAL: NAD, well-groomed  HEENT: PERRL, +EOMI  NECK: soft, Supple, No JVD,   CHEST/LUNG: Clear to auscultation bilaterally; No wheezing  HEART: S1S2+, Regular rate and rhythm; life vest   ABDOMEN: Soft, Nontender, Nondistended; Bowel sounds present  EXTREMITIES:  2+ Peripheral Pulses, No clubbing, cyanosis, or edema  SKIN: No rashes or lesions  NEURO: AAOX3, no focal deficits, no motor r sensory loss  PSYCH: normal mood      LABS:                        13.8   7.71  )-----------( 226      ( 20 Jan 2021 21:06 )             41.8     01-21    141  |  99  |  22.0<H>  ----------------------------<  186<H>  3.6   |  31.0<H>  |  1.23    Ca    8.6      21 Jan 2021 07:50  Mg     1.9     01-21    TPro  7.6  /  Alb  3.1<L>  /  TBili  0.5  /  DBili  x   /  AST  29  /  ALT  17  /  AlkPhos  115  01-20    PT/INR - ( 20 Jan 2021 21:06 )   PT: 14.4 sec;   INR: 1.25 ratio         PTT - ( 20 Jan 2021 21:06 )  PTT:33.3 sec        MEDICATIONS  (STANDING):  aspirin enteric coated 81 milliGRAM(s) Oral daily  atorvastatin 40 milliGRAM(s) Oral at bedtime  carvedilol 12.5 milliGRAM(s) Oral every 12 hours  dextrose 40% Gel 15 Gram(s) Oral once  dextrose 5%. 1000 milliLiter(s) (50 mL/Hr) IV Continuous <Continuous>  dextrose 5%. 1000 milliLiter(s) (100 mL/Hr) IV Continuous <Continuous>  dextrose 50% Injectable 25 Gram(s) IV Push once  dextrose 50% Injectable 12.5 Gram(s) IV Push once  dextrose 50% Injectable 25 Gram(s) IV Push once  furosemide    Tablet 40 milliGRAM(s) Oral daily  gabapentin 100 milliGRAM(s) Oral three times a day  glucagon  Injectable 1 milliGRAM(s) IntraMuscular once  heparin   Injectable 5000 Unit(s) SubCutaneous every 12 hours  insulin glargine Injectable (LANTUS) 20 Unit(s) SubCutaneous at bedtime  insulin lispro (ADMELOG) corrective regimen sliding scale   SubCutaneous three times a day before meals  insulin lispro (ADMELOG) corrective regimen sliding scale   SubCutaneous at bedtime  sacubitril 49 mG/valsartan 51 mG 1 Tablet(s) Oral two times a day  ticagrelor 90 milliGRAM(s) Oral every 12 hours    MEDICATIONS  (PRN):  acetaminophen   Tablet .. 650 milliGRAM(s) Oral every 6 hours PRN Temp greater or equal to 38C (100.4F), Mild Pain (1 - 3)      RADIOLOGY & ADDITIONAL TESTS:

## 2021-01-21 NOTE — CHART NOTE - NSCHARTNOTEFT_GEN_A_CORE
SWNote: per cmngr (Manuel) pt requesting to speak with SW . Worker met with pt, pt related feeling concerned because he received a letter from [facility] to inform him that his SNAP will be discontinued on Feb 1st/2021. Per letter, there has been an increment in the household' income  Per pt. his income has not changed. After talking with pt about household members, pt's adult children live in the same address however, are independent from pt and his wife's household. Worker encouraged pt to contact [facility]  (Mrs Momin) at number listed on the letter to explain situation and to offer to mail his income taxes to proof household status,pt verbalized understanding agreed to f/u accordingly. Worker provided Office of the aging and food programs written info for pt to call as needed. No other SW concerns reported at this moment. SWNote: per cmngr (Manuel) pt requesting to speak with SW . Worker met with pt, pt related feeling concerned because he received a letter from [facility] to inform him that his SNAP will be discontinued on Feb 1st/2021. Per letter, there has been an increment in the household' income  Per pt. his income has not changed. After talking with pt about household members, pt's adult children live in the same address however, are independent from pt and his wife's household. Worker encouraged pt to contact [facility]  (Mrs Momin) at number listed on the letter to explain situation and to offer to mail his income taxes to proof household status,pt verbalized understanding agreed to f/u accordingly. Worker provided Office of the aging and food programs written info for pt to call as needed. Encouraged to call the number given for off of the [facility] resource center to request assistance with utilities' reduction, agreed to do so.  No other SW concerns reported at this moment. SHANNONNote: Nina SW consult/food needs/fin.issues. Worker met with pt, pt related feeling concerned because he received a letter from [facility] to inform him that his SNAP will be discontinued on Feb 1st/2021. Per letter, there has been an increment in the household' income  Per pt. his income has not changed. After talking with pt about household members, pt's adult children live in the same address however, are independent from pt and his wife's household. Worker encouraged pt to contact [facility]  (Mrs Momin) at number listed on the letter to explain situation and to offer to mail his income taxes to proof household status,pt verbalized understanding agreed to f/u accordingly. Worker provided Office of the aging and food programs written info for pt to call as needed. Encouraged to call the number given for off of the [employer] resource center to request assistance with utilities' reduction, agreed to do so.  No other SW concerns reported at this moment.

## 2021-01-21 NOTE — H&P ADULT - ASSESSMENT
In summary pt. is a 66 y/o male with PMHx of Diabetes, HTN, and CAD, s/p CABG, s/p stent x 1 during early part of the month was admitted at [facility], with a life vest, CHF EF <20%, who presents with near syncopal episode. As per pt. he was sitting with his wife in the living room, stood up to go to the bathroom but felt dizzy and the feeling of passing out but did not pass out , did not hit his head to the floor, went back to sitting position. As per triage note, pt's wife reported that pt. was not able to speak clearly, ambulance was called and triage note says pt. was orthostatic prior to arrival and BG was 101 on scene. Pt. has been aaox3 in the ER, no neurological symptoms, no focal weakness. no cp, no sob, no HA. no abd. pain, no n/v/d. no cough, no fever. no complaints at the time of admission, pt. will be admitted for near syncopy , concern for TIA.

## 2021-01-21 NOTE — DISCHARGE NOTE PROVIDER - NSDCMRMEDTOKEN_GEN_ALL_CORE_FT
aspirin 81 mg oral delayed release tablet: 1 tab(s) orally once a day  atorvastatin 40 mg oral tablet: 1 tab(s) orally once a day  Coreg 6.25 mg oral tablet: 1 tab(s) orally 2 times a day   Entresto 49 mg-51 mg oral tablet: 1 tab(s) orally 2 times a day  gabapentin 100 mg oral capsule: 1 cap(s) orally 3 times a day  Lantus 100 units/mL subcutaneous solution: 20 unit(s) subcutaneous once a day (at bedtime)  Lasix 40 mg oral tablet: 1 tab(s) orally 2 times a day  Nitrostat 0.4 mg sublingual tablet: 1 tab(s) sublingual every 5 minutes  NovoLOG 100 units/mL subcutaneous solution: 15 unit(s) subcutaneous  ticagrelor 90 mg oral tablet: 1 tab(s) orally every 12 hours to start 1/5/21    aspirin 81 mg oral delayed release tablet: 1 tab(s) orally once a day  atorvastatin 40 mg oral tablet: 1 tab(s) orally once a day  carvedilol 3.125 mg oral tablet: 1 tab(s) orally every 12 hours  cephalexin 500 mg oral capsule: 1 cap(s) orally every 12 hours  gabapentin 100 mg oral capsule: 1 cap(s) orally 3 times a day  Lantus 100 units/mL subcutaneous solution: 20 unit(s) subcutaneous once a day (at bedtime)  Lasix 40 mg oral tablet: 1 tab(s) orally 2 times a day  NovoLOG 100 units/mL subcutaneous solution: 15 unit(s) subcutaneous  sacubitril-valsartan 24 mg-26 mg oral tablet: 1 tab(s) orally 2 times a day  ticagrelor 90 mg oral tablet: 1 tab(s) orally every 12 hours to start 1/5/21

## 2021-01-21 NOTE — H&P ADULT - HISTORY OF PRESENT ILLNESS
pt. is a 66 y/o male with PMHx of Diabetes, HTN, and CAD, s/p CABG, s/p stent x 1 during early part of the month was admitted at [facility], with a life vest, CHF EF <20%, who presents with near syncopal episode. As per pt. he was sitting with his wife in the living room, stood up to go to the bathroom but felt dizzy and the feeling of passing out but did not pass out , did not hit his head to the floor, went back to sitting position. As per triage note, pt's wife reported that pt. was not able to speak clearly, ambulance was called and triage note says pt. was orthostatic prior to arrival and BG was 101 on scene. Pt. has been aaox3 in the ER, no neurological symptoms, no focal weakness. no cp, no sob, no HA. no abd. pain, no n/v/d. no cough, no fever. no complaints at the time of admission.  pt. is a 64 y/o male with PMHx of Diabetes, HTN, and CAD, s/p CABG, s/p stent x 1 during early part of the month was admitted at [facility], with a life vest, CHF EF 20 to 25 %. , who presents with near syncopal episode. As per pt. he was sitting with his wife in the living room, stood up to go to the bathroom but felt dizzy and the feeling of passing out but did not pass out , did not hit his head to the floor, went back to sitting position. As per triage note, pt's wife reported that pt. was not able to speak clearly, ambulance was called and triage note says pt. was orthostatic prior to arrival and BG was 101 on scene. Pt. has been aaox3 in the ER, no neurological symptoms, no focal weakness. no cp, no sob, no HA. no abd. pain, no n/v/d. no cough, no fever. no complaints at the time of admission.

## 2021-01-21 NOTE — PHYSICAL THERAPY INITIAL EVALUATION ADULT - ADDITIONAL COMMENTS
Pt. lives with family with 12 CJ +6 with bilateral handrails. Dtr was providing assistance for all ALDs PTA. Patient ambulated with rolling walker.

## 2021-01-21 NOTE — ED ADULT NURSE REASSESSMENT NOTE - NS ED NURSE REASSESS COMMENT FT1
pt handed off to RN PG in stable condition. Pt oriented to unit, plan of care explained. call bell system explained to pt. no apparent distress noted at this time. Pt placed on cardiac tele monitor.

## 2021-01-21 NOTE — CONSULT NOTE ADULT - ASSESSMENT
64 yo man with episode of presyncope likely 2/2 orthostatic hypotension. He has a right visual field deficit after lasik eye surgery?  would recommend MRI brain.

## 2021-01-22 LAB
24R-OH-CALCIDIOL SERPL-MCNC: 11.8 NG/ML — LOW (ref 30–80)
ALBUMIN SERPL ELPH-MCNC: 3.3 G/DL — SIGNIFICANT CHANGE UP (ref 3.3–5.2)
ALP SERPL-CCNC: 104 U/L — SIGNIFICANT CHANGE UP (ref 40–120)
ALT FLD-CCNC: 17 U/L — SIGNIFICANT CHANGE UP
ANION GAP SERPL CALC-SCNC: 9 MMOL/L — SIGNIFICANT CHANGE UP (ref 5–17)
AST SERPL-CCNC: 28 U/L — SIGNIFICANT CHANGE UP
BASOPHILS # BLD AUTO: 0.07 K/UL — SIGNIFICANT CHANGE UP (ref 0–0.2)
BASOPHILS NFR BLD AUTO: 1.1 % — SIGNIFICANT CHANGE UP (ref 0–2)
BILIRUB SERPL-MCNC: 0.6 MG/DL — SIGNIFICANT CHANGE UP (ref 0.4–2)
BUN SERPL-MCNC: 26 MG/DL — HIGH (ref 8–20)
CALCIUM SERPL-MCNC: 9 MG/DL — SIGNIFICANT CHANGE UP (ref 8.6–10.2)
CHLORIDE SERPL-SCNC: 99 MMOL/L — SIGNIFICANT CHANGE UP (ref 98–107)
CHOLEST SERPL-MCNC: 96 MG/DL — SIGNIFICANT CHANGE UP
CO2 SERPL-SCNC: 33 MMOL/L — HIGH (ref 22–29)
CREAT SERPL-MCNC: 1.26 MG/DL — SIGNIFICANT CHANGE UP (ref 0.5–1.3)
EOSINOPHIL # BLD AUTO: 0.09 K/UL — SIGNIFICANT CHANGE UP (ref 0–0.5)
EOSINOPHIL NFR BLD AUTO: 1.4 % — SIGNIFICANT CHANGE UP (ref 0–6)
GLUCOSE BLDC GLUCOMTR-MCNC: 118 MG/DL — HIGH (ref 70–99)
GLUCOSE BLDC GLUCOMTR-MCNC: 146 MG/DL — HIGH (ref 70–99)
GLUCOSE BLDC GLUCOMTR-MCNC: 67 MG/DL — LOW (ref 70–99)
GLUCOSE BLDC GLUCOMTR-MCNC: 95 MG/DL — SIGNIFICANT CHANGE UP (ref 70–99)
GLUCOSE SERPL-MCNC: 97 MG/DL — SIGNIFICANT CHANGE UP (ref 70–99)
HCT VFR BLD CALC: 42 % — SIGNIFICANT CHANGE UP (ref 39–50)
HDLC SERPL-MCNC: 39 MG/DL — LOW
HGB BLD-MCNC: 13.3 G/DL — SIGNIFICANT CHANGE UP (ref 13–17)
IMM GRANULOCYTES NFR BLD AUTO: 0.3 % — SIGNIFICANT CHANGE UP (ref 0–1.5)
LIPID PNL WITH DIRECT LDL SERPL: 47 MG/DL — SIGNIFICANT CHANGE UP
LYMPHOCYTES # BLD AUTO: 2.35 K/UL — SIGNIFICANT CHANGE UP (ref 1–3.3)
LYMPHOCYTES # BLD AUTO: 36.9 % — SIGNIFICANT CHANGE UP (ref 13–44)
MAGNESIUM SERPL-MCNC: 2 MG/DL — SIGNIFICANT CHANGE UP (ref 1.6–2.6)
MCHC RBC-ENTMCNC: 27.1 PG — SIGNIFICANT CHANGE UP (ref 27–34)
MCHC RBC-ENTMCNC: 31.7 GM/DL — LOW (ref 32–36)
MCV RBC AUTO: 85.5 FL — SIGNIFICANT CHANGE UP (ref 80–100)
MONOCYTES # BLD AUTO: 0.55 K/UL — SIGNIFICANT CHANGE UP (ref 0–0.9)
MONOCYTES NFR BLD AUTO: 8.6 % — SIGNIFICANT CHANGE UP (ref 2–14)
NEUTROPHILS # BLD AUTO: 3.29 K/UL — SIGNIFICANT CHANGE UP (ref 1.8–7.4)
NEUTROPHILS NFR BLD AUTO: 51.7 % — SIGNIFICANT CHANGE UP (ref 43–77)
NON HDL CHOLESTEROL: 57 MG/DL — SIGNIFICANT CHANGE UP
PLATELET # BLD AUTO: 236 K/UL — SIGNIFICANT CHANGE UP (ref 150–400)
POTASSIUM SERPL-MCNC: 3.5 MMOL/L — SIGNIFICANT CHANGE UP (ref 3.5–5.3)
POTASSIUM SERPL-SCNC: 3.5 MMOL/L — SIGNIFICANT CHANGE UP (ref 3.5–5.3)
PROT SERPL-MCNC: 7.5 G/DL — SIGNIFICANT CHANGE UP (ref 6.6–8.7)
RBC # BLD: 4.91 M/UL — SIGNIFICANT CHANGE UP (ref 4.2–5.8)
RBC # FLD: 14.3 % — SIGNIFICANT CHANGE UP (ref 10.3–14.5)
SARS-COV-2 RNA SPEC QL NAA+PROBE: SIGNIFICANT CHANGE UP
SODIUM SERPL-SCNC: 140 MMOL/L — SIGNIFICANT CHANGE UP (ref 135–145)
TRIGL SERPL-MCNC: 52 MG/DL — SIGNIFICANT CHANGE UP
TROPONIN T SERPL-MCNC: 0.05 NG/ML — SIGNIFICANT CHANGE UP (ref 0–0.06)
TSH SERPL-MCNC: 2.51 UIU/ML — SIGNIFICANT CHANGE UP (ref 0.27–4.2)
VIT B12 SERPL-MCNC: 483 PG/ML — SIGNIFICANT CHANGE UP (ref 232–1245)
WBC # BLD: 6.37 K/UL — SIGNIFICANT CHANGE UP (ref 3.8–10.5)
WBC # FLD AUTO: 6.37 K/UL — SIGNIFICANT CHANGE UP (ref 3.8–10.5)

## 2021-01-22 PROCEDURE — 99232 SBSQ HOSP IP/OBS MODERATE 35: CPT

## 2021-01-22 PROCEDURE — 99233 SBSQ HOSP IP/OBS HIGH 50: CPT

## 2021-01-22 PROCEDURE — 71045 X-RAY EXAM CHEST 1 VIEW: CPT | Mod: 26

## 2021-01-22 PROCEDURE — 93010 ELECTROCARDIOGRAM REPORT: CPT

## 2021-01-22 RX ORDER — DEXTROSE 50 % IN WATER 50 %
12.5 SYRINGE (ML) INTRAVENOUS ONCE
Refills: 0 | Status: COMPLETED | OUTPATIENT
Start: 2021-01-22 | End: 2021-01-22

## 2021-01-22 RX ORDER — SACUBITRIL AND VALSARTAN 24; 26 MG/1; MG/1
1 TABLET, FILM COATED ORAL
Refills: 0 | Status: DISCONTINUED | OUTPATIENT
Start: 2021-01-22 | End: 2021-01-23

## 2021-01-22 RX ORDER — CEPHALEXIN 500 MG
500 CAPSULE ORAL EVERY 12 HOURS
Refills: 0 | Status: DISCONTINUED | OUTPATIENT
Start: 2021-01-23 | End: 2021-01-25

## 2021-01-22 RX ORDER — CEFAZOLIN SODIUM 1 G
2000 VIAL (EA) INJECTION EVERY 8 HOURS
Refills: 0 | Status: COMPLETED | OUTPATIENT
Start: 2021-01-23 | End: 2021-01-23

## 2021-01-22 RX ADMIN — TICAGRELOR 90 MILLIGRAM(S): 90 TABLET ORAL at 05:07

## 2021-01-22 RX ADMIN — GABAPENTIN 100 MILLIGRAM(S): 400 CAPSULE ORAL at 05:11

## 2021-01-22 RX ADMIN — INSULIN GLARGINE 20 UNIT(S): 100 INJECTION, SOLUTION SUBCUTANEOUS at 21:49

## 2021-01-22 RX ADMIN — CARVEDILOL PHOSPHATE 12.5 MILLIGRAM(S): 80 CAPSULE, EXTENDED RELEASE ORAL at 21:00

## 2021-01-22 RX ADMIN — Medication 81 MILLIGRAM(S): at 10:57

## 2021-01-22 RX ADMIN — GABAPENTIN 100 MILLIGRAM(S): 400 CAPSULE ORAL at 21:00

## 2021-01-22 RX ADMIN — HEPARIN SODIUM 5000 UNIT(S): 5000 INJECTION INTRAVENOUS; SUBCUTANEOUS at 05:07

## 2021-01-22 RX ADMIN — GABAPENTIN 100 MILLIGRAM(S): 400 CAPSULE ORAL at 10:57

## 2021-01-22 RX ADMIN — CARVEDILOL PHOSPHATE 12.5 MILLIGRAM(S): 80 CAPSULE, EXTENDED RELEASE ORAL at 05:07

## 2021-01-22 RX ADMIN — ATORVASTATIN CALCIUM 40 MILLIGRAM(S): 80 TABLET, FILM COATED ORAL at 21:00

## 2021-01-22 RX ADMIN — Medication 12.5 GRAM(S): at 10:53

## 2021-01-22 RX ADMIN — SACUBITRIL AND VALSARTAN 1 TABLET(S): 24; 26 TABLET, FILM COATED ORAL at 21:00

## 2021-01-22 NOTE — PROGRESS NOTE ADULT - ASSESSMENT
The patient is a 65 year old male with a past medical history of diabetes mellitus type 2, hypertension, cardiomyopathy status post CABG with EF of <20% with a life vest who presented to the  ER after a near syncopal episode at home. 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 Carotid duplex showed no evidence of stenosis. Seen by cardiology and neurology. Had episode of NSVT on the monitor and ++ Orthostatic Bp and pulse.     Assessment/Plan:    1. Near syncope: ++ Orthostatic BP and pulse; hold Lasix  Coreg and entresto with parameters  Patient has not been taking his medications for the past 2 days at home as he states they make him feel dizzy    Neurology was consulted for possible TIA however doubt neuro in nature. [place] cardiac. Right visual field deficit from previous occipital stroke? Will hold off on MRI for now  as no other deficit noted    2. NSVT; Life vest to be interrogated  EP consulted for evaluation- NPO for possible EP study today    3. Ischemic cardiomyopathy- Aspirin brillinta and statin  ENtresto and coreg with parameters    4. Uncontrolled diabetes mellitus type 2; Hbaic of 10   On lantus and HSS  BSL controlled  Diabetes educator consulted     VTE - Heparin subcut

## 2021-01-22 NOTE — PROGRESS NOTE ADULT - SUBJECTIVE AND OBJECTIVE BOX
[facility]                          [address] [place] [address]                       Phone: [phone]. Fax:[phone]    PROCEDURE(S): ICD implantation  ELECTROPHYSIOLOGIST: Luisito Leal (MD)         COMPLICATIONS:  none        DISPOSITION:  observation     CONDITION: stable    Pt doing well s/p ICD implantation  Resting comfortably. Denies complaint.     REVIEW OF SYMPTOMS:   Cardiovascular:  No chest pain,  No dyspnea,  No fatigue, No syncope,  No palpitations, No dizziness, No Orthopnea, No Paroxsymal nocturnal dyspnea.  Respiratory:  No Dyspnea, No cough.  Genitourinary:  No dysuria, no hematuria.  Gastrointestinal:  No nausea, no vomiting. No diarrhea.  No abdominal pain.   Neurological: No headache, no dizziness, no slurred speech.  Psychiatric: No agitation, no anxiety.    ALL OTHER REVIEW OF SYSTEMS ARE NEGATIVE.    MEDICATIONS  (STANDING):  aspirin enteric coated 81 milliGRAM(s) Oral daily  atorvastatin 40 milliGRAM(s) Oral at bedtime  carvedilol 12.5 milliGRAM(s) Oral every 12 hours  gabapentin 100 milliGRAM(s) Oral three times a day  glucagon  Injectable 1 milliGRAM(s) IntraMuscular once  heparin   Injectable 5000 Unit(s) SubCutaneous every 12 hours  insulin glargine Injectable (LANTUS) 20 Unit(s) SubCutaneous at bedtime  insulin lispro (ADMELOG) corrective regimen sliding scale   SubCutaneous three times a day before meals  insulin lispro (ADMELOG) corrective regimen sliding scale   SubCutaneous at bedtime  sacubitril 49 mG/valsartan 51 mG 1 Tablet(s) Oral two times a day  ticagrelor 90 milliGRAM(s) Oral every 12 hours    MEDICATIONS  (PRN):  acetaminophen   Tablet .. 650 milliGRAM(s) Oral every 6 hours PRN Temp greater or equal to 38C (100.4F), Mild Pain (1 - 3)    Allergies  Toprol-XL (Faint; Other)    Vital Signs Last 24 Hrs  T(C): 36.3 (22 Jan 2021 15:24), Max: 36.8 (22 Jan 2021 11:58)  T(F): 97.3 (22 Jan 2021 15:24), Max: 98.3 (22 Jan 2021 11:58)  HR: 74 (22 Jan 2021 17:52) (73 - 83)  BP: 134/75 (22 Jan 2021 17:52) (77/55 - 134/75)  BP(mean): 87 (22 Jan 2021 04:29) (87 - 87)  RR: 16 (22 Jan 2021 17:52) (16 - 20)  SpO2: 99% (22 Jan 2021 17:52) (97% - 100%)    PHYSICAL EXAM:  Constitutional: Comfortable . No acute distress.   HEENT: Atraumatic and normcephalic , neck is supple . no JVD.  PEERL   CNS: A&O x3. No focal neurologic deficits.   Respiratory: CTAB. No wheezing, rhonchi or crackles   Cardiovascular:  S1S2 RRR. No murmur or rubs  Thorax: Dressing noted in anterior wall, left sided, that is clean, dry and intact. No bleeding, hematoma, erythema, exudate or edema  Gastrointestinal: Soft non-tender and non distended . +Bowel sounds.   Extremities: No pitting  edema x 4 extremities  Psychiatric: Calm . no agitation.    Assessment:   66 y/o male with PMHx of Diabetes, HTN, and CAD, s/p CABG, s/p stent x 1 during early part of the month was admitted at [facility], with a life vest, CHF EF 20 to 25 %. , who presents with near syncopal episode. As per pt. he was sitting with his wife in the living room, stood up to go to the bathroom but felt dizzy and the feeling of passing out but did not pass out , did not hit his head to the floor, went back to sitting position. As per triage note, pt's wife reported that pt. was not able to speak clearly, ambulance was called and triage note says pt. was orthostatic prior to arrival and BG was 101 on scene. Pt. has been aaox3 in the ER, no neurological symptoms, no focal weakness. no cp, no sob, no HA. no abd. pain, no n/v/d. no cough, no fever. no complaints at the time of admission.  (21 Jan 2021 01:23)    Plan:   Admit to telemetry  AM labs, post procedure orders and ECG were ordered  Bedrest 6 hours and then OOB to chair and progress to ambulate as tolerated   Dressing to be removed by EP service in AM.   PO pain analgesics prn  Observation and monitoring on telemetry overnight with anticipated discharge in the AM   Keflex 500 BID starting tomorrow  Ancef 2nd dose at 03;00 01/23 and 3ed 11;00  Pt hemodynamically stable

## 2021-01-22 NOTE — PROGRESS NOTE ADULT - SUBJECTIVE AND OBJECTIVE BOX
Patient feels very anxious.  He also feels out of breath.  He didnt sleep well and feels very tired.     REVIEW OF SYSTEMS  Constitutional - No fever,  +fatigue  HEENT - No vertigo, No neck pain  Neurological - No headaches, No memory loss, No loss of strength, No numbness, No tremors  Musculoskeletal - No joint pain, No joint swelling, No muscle pain  Psychiatric - No depression, +anxiety    FUNCTIONAL PROGRESS  1/21  · Diagnosis	Receptive and expressive language skills WFL, speech intelligibility WFL.  Cognitive linguistic skills upon informal screening WFL.    1/21  Bed Mobility: Rolling/Turning:     · Level of Maury	supervision  · Physical Assist/Nonphysical Assist	supervision    Bed Mobility: Scooting/Bridging:     · Level of Maury	supervision  · Physical Assist/Nonphysical Assist	supervision    Bed Mobility: Sit to Supine:     · Level of Maury	supervision  · Physical Assist/Nonphysical Assist	supervision    Bed Mobility Analysis:     · Bed Mobility Limitations	decreased ability to use legs for bridging/pushing  · Impairments Contributing to Impaired Bed Mobility	decreased strength    Transfer: Chair to Bed:     · Level of Maury	contact guard  · Physical Assist/Nonphysical Assist	verbal cues  · Weight-Bearing Restrictions	full weight-bearing  · Assistive Device	rolling walker    Transfer: Sit to Stand:     · Level of Maury	supervision  · Physical Assist/Nonphysical Assist	supervision  · Weight-Bearing Restrictions	full weight-bearing  · Assistive Device	rolling walker    Transfer: Stand to Sit:     · Level of Maury	supervision  · Physical Assist/Nonphysical Assist	supervision  · Weight-Bearing Restrictions	full weight-bearing  · Assistive Device	rolling walker    Sit/Stand Transfer Safety Analysis:     · Transfer Safety Concerns Noted	decreased sequencing ability  · Impairments Contributing to Impaired Transfers	decreased strength; impaired coordination    Gait Skills:     · Level of Maury	contact guard  · Physical Assist/Nonphysical Assist	1 person assist  · Weight-Bearing Restrictions	full weight-bearing  · Assistive Device	rolling walker  · Gait Distance	50 feet    Gait Analysis:     · Gait Pattern Used	3-point gait  · Gait Deviations Noted	decreased yudelka; decreased step length; decreased velocity of limb motion  · Impairments Contributing to Gait Deviations	decreased strength; impaired coordination; impaired balance    Stair Negotiation:     · Level of Maury	contact guard  · Physical Assist/Nonphysical Assist	1 person assist  · Weight-Bearing Restrictions	full weight-bearing  · Stair Pattern	step over step  · Number of Stairs	12    VITALS  T(C): 36.4 (01-22-21 @ 04:29), Max: 36.6 (01-21-21 @ 23:54)  HR: 75 (01-22-21 @ 09:16) (73 - 78)  BP: 77/55 (01-22-21 @ 09:16) (77/55 - 132/78)  RR: 20 (01-22-21 @ 04:29) (20 - 20)  SpO2: 99% (01-22-21 @ 04:29) (98% - 99%)  Wt(kg): --    MEDICATIONS   acetaminophen   Tablet .. 650 milliGRAM(s) every 6 hours PRN  aspirin enteric coated 81 milliGRAM(s) daily  atorvastatin 40 milliGRAM(s) at bedtime  carvedilol 12.5 milliGRAM(s) every 12 hours  dextrose 40% Gel 15 Gram(s) once  dextrose 5%. 1000 milliLiter(s) <Continuous>  dextrose 5%. 1000 milliLiter(s) <Continuous>  dextrose 50% Injectable 25 Gram(s) once  dextrose 50% Injectable 12.5 Gram(s) once  dextrose 50% Injectable 25 Gram(s) once  furosemide    Tablet 40 milliGRAM(s) daily  gabapentin 100 milliGRAM(s) three times a day  glucagon  Injectable 1 milliGRAM(s) once  heparin   Injectable 5000 Unit(s) every 12 hours  insulin glargine Injectable (LANTUS) 20 Unit(s) at bedtime  insulin lispro (ADMELOG) corrective regimen sliding scale   three times a day before meals  insulin lispro (ADMELOG) corrective regimen sliding scale   at bedtime  sacubitril 49 mG/valsartan 51 mG 1 Tablet(s) two times a day  ticagrelor 90 milliGRAM(s) every 12 hours      RECENT LABS/IMAGING                          13.3   6.37  )-----------( 236      ( 22 Jan 2021 07:37 )             42.0     01-22    140  |  99  |  26.0<H>  ----------------------------<  97  3.5   |  33.0<H>  |  1.26    Ca    9.0      22 Jan 2021 07:33  Mg     2.0     01-22    TPro  7.5  /  Alb  3.3  /  TBili  0.6  /  DBili  x   /  AST  28  /  ALT  17  /  AlkPhos  104  01-22    PT/INR - ( 20 Jan 2021 21:06 )   PT: 14.4 sec;   INR: 1.25 ratio         PTT - ( 20 Jan 2021 21:06 )  PTT:33.3 sec            HEAD CT 1/20 - Gliosis/encephalomalacia of the right frontal and left occipital 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  No acute intracranial abnormality.      ----------------------------------------------------------------------------------------  PHYSICAL EXAM  Constitutional - NAD, Comfortable  Chest - Breathing comfortably, No wheezing  Extremities - No C/C/E, No calf tenderness   Neurologic Exam -                    Cognitive - AAO to self, place, date, year, situation     Motor - No focal deficits     Sensory - Intact to LT     Coordination - FTN slowed, Left sided lean with ambulation - walked 150 feet/without device with intermittent assist for corrective balance due to left lean   Psychiatric - Mood stable, Affect WNL  ----------------------------------------------------------------------------------------  ASSESSMENT/PLAN  65yMale with functional deficits after feeling weak  TIA/CAD - ASA, Brilinta, Lipitor  Syncope - Telemetry, Planned for EP  HTN - Coreg, Lasix  DM2 - Lantus  Pain - Tylenol, Neurontin  DVT PPX - SCDs, Heparin  Rehab - Patient at supervision level. Medically being optimized. Recommend DC HOME with HOME CARE.     Continue bedside therapy as well as OOB throughout the day with mobilization by staff to maintain cardiopulmonary function and prevention of secondary complications related to debility.     Discussed with rehab team.

## 2021-01-22 NOTE — PROGRESS NOTE ADULT - SUBJECTIVE AND OBJECTIVE BOX
CC: Follow up    INTERVAL HPI/OVERNIGHT EVENTS: Patient seen and examined, complaints of dizziness when standing states he has not taking in blood pressure medications in the past few days because he feels dizzy and then falls to the ground. Feels anxious about being in the ER.       Vital Signs Last 24 Hrs  T(C): 36.4 (22 Jan 2021 04:29), Max: 36.6 (21 Jan 2021 23:54)  T(F): 97.6 (22 Jan 2021 04:29), Max: 97.9 (21 Jan 2021 23:54)  HR: 75 (22 Jan 2021 09:16) (73 - 78)  BP: 77/55 (22 Jan 2021 09:16) (77/55 - 132/78)  BP(mean): 87 (22 Jan 2021 04:29) (87 - 87)  RR: 20 (22 Jan 2021 04:29) (20 - 20)  SpO2: 99% (22 Jan 2021 04:29) (98% - 99%)    PHYSICAL EXAM:    GENERAL: NAD,AOX3  HEAD:  Atraumatic, Normocephalic  EYES: conjunctiva and sclera clear  ENMT: Moist mucous membranes  NECK: Supple, No JVD  NERVOUS SYSTEM:  Alert & Oriented X3, Motor Strength 5/5 B/L upper and lower extremities  CHEST/LUNG: Clear to auscultation bilaterally; No rales, rhonchi, wheezing, or rubs  HEART: Regular rate and rhythm; No murmurs, rubs, or gallops  ABDOMEN: Soft, Nontender, Nondistended; Bowel sounds present  EXTREMITIES:  2+ Peripheral Pulses, No clubbing, cyanosis, or edema        MEDICATIONS  (STANDING):  aspirin enteric coated 81 milliGRAM(s) Oral daily  atorvastatin 40 milliGRAM(s) Oral at bedtime  carvedilol 12.5 milliGRAM(s) Oral every 12 hours  dextrose 40% Gel 15 Gram(s) Oral once  dextrose 5%. 1000 milliLiter(s) (50 mL/Hr) IV Continuous <Continuous>  dextrose 5%. 1000 milliLiter(s) (100 mL/Hr) IV Continuous <Continuous>  dextrose 50% Injectable 25 Gram(s) IV Push once  dextrose 50% Injectable 12.5 Gram(s) IV Push once  dextrose 50% Injectable 25 Gram(s) IV Push once  gabapentin 100 milliGRAM(s) Oral three times a day  glucagon  Injectable 1 milliGRAM(s) IntraMuscular once  heparin   Injectable 5000 Unit(s) SubCutaneous every 12 hours  insulin glargine Injectable (LANTUS) 20 Unit(s) SubCutaneous at bedtime  insulin lispro (ADMELOG) corrective regimen sliding scale   SubCutaneous three times a day before meals  insulin lispro (ADMELOG) corrective regimen sliding scale   SubCutaneous at bedtime  sacubitril 49 mG/valsartan 51 mG 1 Tablet(s) Oral two times a day  ticagrelor 90 milliGRAM(s) Oral every 12 hours    MEDICATIONS  (PRN):  acetaminophen   Tablet .. 650 milliGRAM(s) Oral every 6 hours PRN Temp greater or equal to 38C (100.4F), Mild Pain (1 - 3)      Allergies    Toprol-XL (Faint; Other)    Intolerances          LABS:                          13.3   6.37  )-----------( 236      ( 22 Jan 2021 07:37 )             42.0     01-22    140  |  99  |  26.0<H>  ----------------------------<  97  3.5   |  33.0<H>  |  1.26    Ca    9.0      22 Jan 2021 07:33  Mg     2.0     01-22    TPro  7.5  /  Alb  3.3  /  TBili  0.6  /  DBili  x   /  AST  28  /  ALT  17  /  AlkPhos  104  01-22    PT/INR - ( 20 Jan 2021 21:06 )   PT: 14.4 sec;   INR: 1.25 ratio         PTT - ( 20 Jan 2021 21:06 )  PTT:33.3 sec      RADIOLOGY & ADDITIONAL TESTS:

## 2021-01-22 NOTE — PROGRESS NOTE ADULT - SUBJECTIVE AND OBJECTIVE BOX
[facility]                                        [facility]                                 [facility]                                  [address]                                        [place], [place], [address]                                             [phone]        CC: syncope    HPI:   The patient is a 65 year old man with multiple medical issues who developed severe dizziness and nearly passed out.   He has been at baseline since.     Interim history:  Remains in ER awaiting bed.     ROS:   Denies headache or dizziness.  Denies chest pain.  Denies shortness of breath.    MEDICATIONS  (STANDING):  aspirin enteric coated 81 milliGRAM(s) Oral daily  atorvastatin 40 milliGRAM(s) Oral at bedtime  carvedilol 12.5 milliGRAM(s) Oral every 12 hours  dextrose 40% Gel 15 Gram(s) Oral once  dextrose 5%. 1000 milliLiter(s) (50 mL/Hr) IV Continuous <Continuous>  gabapentin 100 milliGRAM(s) Oral three times a day  glucagon  Injectable 1 milliGRAM(s) IntraMuscular once  heparin   Injectable 5000 Unit(s) SubCutaneous every 12 hours  insulin glargine Injectable (LANTUS) 20 Unit(s) SubCutaneous at bedtime  insulin lispro (ADMELOG) corrective regimen sliding scale   SubCutaneous three times a day before meals  insulin lispro (ADMELOG) corrective regimen sliding scale   SubCutaneous at bedtime  sacubitril 49 mG/valsartan 51 mG 1 Tablet(s) Oral two times a day  ticagrelor 90 milliGRAM(s) Oral every 12 hours      Vital Signs Last 24 Hrs  T(C): 36.8 (22 Jan 2021 11:58), Max: 36.8 (22 Jan 2021 11:58)  T(F): 98.3 (22 Jan 2021 11:58), Max: 98.3 (22 Jan 2021 11:58)  HR: 75 (22 Jan 2021 09:16) (73 - 78)  BP: 77/55 (22 Jan 2021 09:16) (77/55 - 132/78)  BP(mean): 87 (22 Jan 2021 04:29) (87 - 87)  RR: 20 (22 Jan 2021 11:58) (20 - 20)  SpO2: 100% (22 Jan 2021 11:58) (98% - 100%)    Detailed Neurologic Exam:    Mental status: The patient is awake and alert. There is no aphasia. There is no dysarthria.     Cranial nerves: Pupils equal and react symmetrically to light. There is no visual field deficit to threat. Some decreased acuity in right eye. Extraocular motion is full with no nystagmus. There is no ptosis. Facial sensation is intact. Facial musculature is symmetric. Palate elevates symmetrically. Tongue is midline.    Motor: There is normal bulk and tone.  There is no tremor.  Strength grossly 5/5 bilaterally.    Sensation: Grossly intact to light touch and pin.    Reflexes: 2+ throughout and plantar responses are flexor.    Cerebellar: No dysmetria on finger nose testing.    Labs:     01-22    140  |  99  |  26.0<H>  ----------------------------<  97  3.5   |  33.0<H>  |  1.26    Ca    9.0      22 Jan 2021 07:33  Mg     2.0     01-22    TPro  7.5  /  Alb  3.3  /  TBili  0.6  /  DBili  x   /  AST  28  /  ALT  17  /  AlkPhos  104  01-22                            13.3   6.37  )-----------( 236      ( 22 Jan 2021 07:37 )             42.0

## 2021-01-22 NOTE — CONSULT NOTE ADULT - SUBJECTIVE AND OBJECTIVE BOX
Neurology Consult Note  [facility] at [place] [address] [place] [address]  [phone]    HPI:  66 yo man with medical conditions as outlined below presents for episode of near syncope. He states that he was sitting down and then stood up to walk. He states when he stood up, he felt lightheaded as if he was going to pass out. He states he sat down with the help of his wife and daughter and after 2 minutes, he felt back to baseline. His BP was 88/54 earlier this morning. He states he still feels a little bit lightheaded. He denies change in vision, change in speech, focal weakness, or focal numbness. He denies a history of seizure. He has no further complaints.     He states he had lasik eye surgery about a year ago and since then, he has had difficulty seeing out of his right eye    PMHx:  Abnormal nuclear stress test   CAD (Coronary Artery Disease) LAD occlusion  Depression   Diabetes     PSHx:  S/P CABG (coronary artery bypass graft)   S/P Inguinal Hernia Repair ishaan with mesh 2008.    Meds:  MEDICATIONS  (STANDING):  aspirin enteric coated 81 milliGRAM(s) Oral daily  atorvastatin 40 milliGRAM(s) Oral at bedtime  carvedilol 12.5 milliGRAM(s) Oral every 12 hours  dextrose 40% Gel 15 Gram(s) Oral once  dextrose 5%. 1000 milliLiter(s) (50 mL/Hr) IV Continuous <Continuous>  dextrose 5%. 1000 milliLiter(s) (100 mL/Hr) IV Continuous <Continuous>  dextrose 50% Injectable 25 Gram(s) IV Push once  dextrose 50% Injectable 12.5 Gram(s) IV Push once  dextrose 50% Injectable 25 Gram(s) IV Push once  furosemide    Tablet 40 milliGRAM(s) Oral daily  gabapentin 100 milliGRAM(s) Oral three times a day  glucagon  Injectable 1 milliGRAM(s) IntraMuscular once  heparin   Injectable 5000 Unit(s) SubCutaneous every 12 hours  insulin glargine Injectable (LANTUS) 20 Unit(s) SubCutaneous at bedtime  insulin lispro (ADMELOG) corrective regimen sliding scale   SubCutaneous three times a day before meals  insulin lispro (ADMELOG) corrective regimen sliding scale   SubCutaneous at bedtime  sacubitril 49 mG/valsartan 51 mG 1 Tablet(s) Oral two times a day  ticagrelor 90 milliGRAM(s) Oral every 12 hours    Allergies:  Toprol-XL    FamHx:  unknown    SocHx:  Denies smoking or alcohol use    Physical Exam  Vital Signs Last 24 Hrs  T(C): 36.4 (21 Jan 2021 15:05), Max: 36.7 (20 Jan 2021 20:53)  T(F): 97.6 (21 Jan 2021 15:05), Max: 98.1 (20 Jan 2021 20:53)  HR: 78 (21 Jan 2021 15:05) (78 - 86)  BP: 132/78 (21 Jan 2021 15:05) (88/54 - 132/78)  BP(mean): --  RR: 20 (21 Jan 2021 15:05) (18 - 20)  SpO2: 98% (21 Jan 2021 15:05) (96% - 98%)  Mental Status: AAO x 3, no dysarthria, no aphasia  CN: PERRL, EOMI, right eye partial visual field deficit, V1-V3 sensation intact, no facial asymmetry  Motor:  5/5 x 4 extremities  Sensory: intact to light touch throughout  Coordination: no dysmetria on FTN  Gait: deferred      LABS:  cret                        13.8   7.71  )-----------( 226      ( 20 Jan 2021 21:06 )             41.8     01-21    141  |  99  |  22.0<H>  ----------------------------<  186<H>  3.6   |  31.0<H>  |  1.23    Ca    8.6      21 Jan 2021 07:50  Mg     1.9     01-21    TPro  7.6  /  Alb  3.1<L>  /  TBili  0.5  /  DBili  x   /  AST  29  /  ALT  17  /  AlkPhos  115  01-20    PT/INR - ( 20 Jan 2021 21:06 )   PT: 14.4 sec;   INR: 1.25 ratio         PTT - ( 20 Jan 2021 21:06 )  PTT:33.3 sec    CT Head:    Impression: Gliosis/encephalomalacia of the right frontal and left occipital 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  No acute intracranial abnormality.    
                [facility], [facility]                                   [address] [place] [address]                                                      PHONE: [phone]                                                         FAX: [phone]  [email or web address]  ---------------------------------------------------------------------------------------------------------------------------------    Reason for Consult: SYNCOPE    HPI:  SHASTA MCKINLEY is an 65y Male with CAD s/p CABG Ischemic DCM with severe biventricular dysfunction,  HFrEF with volume overload, LVEF 20%, s/p life vest ten days ago, Mod not severe MR, Mod pul HTN, DM with Hb A1C above 10.   experienced recurrent episodes of dizziness since recent discharged yesterday wife reported that he was unresponsive for a short period of time as discussed in Guinean over the phone.   No CP or SOB        PAST MEDICAL & SURGICAL HISTORY:  Abnormal nuclear stress test    Depression    Diabetes    HTN (Hypertension)  controled with meds    CAD (Coronary Artery Disease)  LAD occlusion    S/P CABG (coronary artery bypass graft)    S/P Inguinal Hernia Repair  ishaan with mesh 2008        Toprol-XL (Faint; Other)      MEDICATIONS  (STANDING):  aspirin enteric coated 81 milliGRAM(s) Oral daily  atorvastatin 40 milliGRAM(s) Oral at bedtime  carvedilol 12.5 milliGRAM(s) Oral every 12 hours  dextrose 40% Gel 15 Gram(s) Oral once  dextrose 5%. 1000 milliLiter(s) (50 mL/Hr) IV Continuous <Continuous>  dextrose 5%. 1000 milliLiter(s) (100 mL/Hr) IV Continuous <Continuous>  dextrose 50% Injectable 25 Gram(s) IV Push once  dextrose 50% Injectable 12.5 Gram(s) IV Push once  dextrose 50% Injectable 25 Gram(s) IV Push once  furosemide    Tablet 40 milliGRAM(s) Oral daily  gabapentin 100 milliGRAM(s) Oral three times a day  glucagon  Injectable 1 milliGRAM(s) IntraMuscular once  heparin   Injectable 5000 Unit(s) SubCutaneous every 12 hours  insulin glargine Injectable (LANTUS) 20 Unit(s) SubCutaneous at bedtime  insulin lispro (ADMELOG) corrective regimen sliding scale   SubCutaneous three times a day before meals  insulin lispro (ADMELOG) corrective regimen sliding scale   SubCutaneous at bedtime  sacubitril 49 mG/valsartan 51 mG 1 Tablet(s) Oral two times a day  ticagrelor 90 milliGRAM(s) Oral every 12 hours    MEDICATIONS  (PRN):  acetaminophen   Tablet .. 650 milliGRAM(s) Oral every 6 hours PRN Temp greater or equal to 38C (100.4F), Mild Pain (1 - 3)      Social History:  Cigarettes:                    Alchohol:                 Illicit Drug Abuse:      REVIEW OF SYSTEMS:    Constitutional: No fever, weight loss or fatigue  Eyes: No eye pain, visual disturbances, or discharge  ENMT:  No difficulty hearing, tinnitus, vertigo; No sinus or throat pain  Neck: No pain or stiffness  Respiratory: No cough, wheezing, chills or hemoptysis  Cardiovascular: No chest pain, palpitations, shortness of breath, dizziness or leg swelling  Gastrointestinal: No abdominal or epigastric pain. No nausea, vomiting or hematemesis; No diarrhea or constipation. No melena or hematochezia.  Genitourinary: No dysuria, frequency, hematuria or incontinence  Rectal: No pain, hemorrhoids or incontinence  Neurological: No headaches, memory loss, loss of strength, numbness or tremors  Skin: No itching, burning, rashes or lesions   Lymph Nodes: No enlarged glands  Endocrine: No heat or cold intolerance; No hair loss  Musculoskeletal: No joint pain or swelling; No muscle, back or extremity pain  Psychiatric: No depression, anxiety, mood swings or difficulty sleeping  Heme/Lymph: No easy bruising or bleeding gums  Allergy and Immunologic: No hives or eczema    Vital Signs Last 24 Hrs  T(C): 36.4 (21 Jan 2021 15:05), Max: 36.7 (20 Jan 2021 20:53)  T(F): 97.6 (21 Jan 2021 15:05), Max: 98.1 (20 Jan 2021 20:53)  HR: 78 (21 Jan 2021 15:05) (78 - 86)  BP: 132/78 (21 Jan 2021 15:05) (88/54 - 132/78)  BP(mean): --  RR: 20 (21 Jan 2021 15:05) (18 - 20)  SpO2: 98% (21 Jan 2021 15:05) (96% - 98%)  ICU Vital Signs Last 24 Hrs  SHASTA ELÍAS  I&O's Detail    I&O's Summary    Drug Dosing Weight  SHASTA MCKINLEY      PHYSICAL EXAM:  General: Appears well developed, well nourished alert and cooperative.  HEENT: Head; normocephalic, atraumatic.  Eyes: Pupils reactive, cornea wnl.  Neck: Supple, no nodes adenopathy, no NVD or carotid bruit or thyromegaly.  CARDIOVASCULAR: Normal S1 and S2, No murmur, rub, gallop or lift.   LUNGS: No rales, rhonchi or wheeze. Normal breath sounds bilaterally.  ABDOMEN: Soft, nontender without mass or organomegaly. bowel sounds normoactive.  EXTREMITIES: No clubbing, cyanosis or edema. Distal pulses wnl.   SKIN: warm and dry with normal turgor.  NEURO: Alert/oriented x 3/normal motor exam. No pathologic reflexes.    PSYCH: normal affect.        LABS:                        13.8   7.71  )-----------( 226      ( 20 Jan 2021 21:06 )             41.8     01-21    141  |  99  |  22.0<H>  ----------------------------<  186<H>  3.6   |  31.0<H>  |  1.23    Ca    8.6      21 Jan 2021 07:50  Mg     1.9     01-21    TPro  7.6  /  Alb  3.1<L>  /  TBili  0.5  /  DBili  x   /  AST  29  /  ALT  17  /  AlkPhos  115  01-20    SHASTA MCKINLEY  CARDIAC MARKERS ( 21 Jan 2021 07:50 )  x     / 0.05 ng/mL / x     / x     / x      CARDIAC MARKERS ( 21 Jan 2021 00:13 )  x     / 0.04 ng/mL / x     / x     / x      CARDIAC MARKERS ( 20 Jan 2021 21:06 )  x     / 0.05 ng/mL / x     / x     / x          PT/INR - ( 20 Jan 2021 21:06 )   PT: 14.4 sec;   INR: 1.25 ratio         PTT - ( 20 Jan 2021 21:06 )  PTT:33.3 sec      RADIOLOGY & ADDITIONAL STUDIES:    INTERPRETATION OF TELEMETRY (personally reviewed):    ECG: NSR     < from: Cardiac Cath Lab - Adult (01.04.21 @ 15:52) >  VENTRICLES: EF 30% w/ inferior akinesis and global hypokinesis w/  moderate-severe MR.  CORONARY VESSELS: R dominant.  Moderate sized RCA w/ proximal occlusion and distal vessel fills via L  collaterals.  Mild LM disease.  Occluded LAD w/ large vessel filling from graft.  Large LCx w/ 80% proximal stenosis w/ large distal vessel w/ mild disease.  Grafts-1. LIMA to LAD-patent.  CX:   --  Proximal circumflex: There was a 80 % stenosis.  COMPLICATIONS: No complications occurred during the cath lab visit. No  complications occurred during the cath lab visit.  DIAGNOSTIC IMPRESSIONS: Severe pulmonary HTN.  ICS x 1 to LCx w/ 0% residual stenosis and CARLEEN III flow maintained pre and  post PCI.  IVUS of LCx.  Patent LIMA to LAD.  Severe LV dysfunction w/ moderate-severe MR.  DIAGNOSTIC RECOMMENDATIONS: Asa/brilinta.  Followup at [facility] in 1-2 weeks/?HUSEIYN to assess for mitraclip ( for fxnal  MR/as per COAPT trial ).  INTERVENTIONAL IMPRESSIONS: Severe pulmonary HTN.  ICS x 1 to LCx w/ 0% residual stenosis and CARLEEN III flow maintained pre and  post PCI.  IVUS of LCx.  Patent LIMA to LAD.  Severe LV dysfunction w/ moderate-severe MR.  INTERVENTIONAL RECOMMENDATIONS: Asa/brilinta.  Followup at [facility] in 1-2 weeks/?HUSEYIN to assess for mitraclip ( for fxnal  MR/as per COAPT trial ).  Prepared and signed by  Efren Hernandez MD    < end of copied text >      < from: [facility] Echo Doppler (01.05.21 @ 08:34) >  Summary:   1. Left ventricular ejection fraction, by visual estimation, is 20 to 25%.   2. Multiple left ventricular regional wall motion abnormalities exist. See wall motion findings.   3. Moderate to severe right atrial enlargement.   4. Severely enlarged left atrium.   5. Elevated mean left atrial pressure.   6. Mildly increased LV wall thickness.   7. Spectral Doppler shows restrictive pattern of left ventricular myocardial filling (Grade III diastolic dysfunction).   8. There is mild concentric left ventricular hypertrophy.   9. Moderately enlarged right ventricle.  10. Severely reduced RV systolic function.  11. Severely en;larged LA with SEC in LA and LAAA w/o thrombus.  12. Moderate pleural effusion in the left lateral region.  13. Moderate mitral valve regurgitation.  14. Moderate MR by color flow, PISA ERO 0.2 cm2, no evidence of pul vein reversal.  15. Moderate tricuspid regurgitation.  16. Mild pulmonic valve regurgitation.  17. Estimated pulmonary artery systolic pressure is 50.4 mmHg assuming a right atrial pressure of 12 mmHg, which is consistent with moderate pulmonary hypertension.  18. Color flow doppler and intravenous injection of agitated saline demonstrates the presence of an intact intra atrial septum.    MD Naren Electronically signed on 1/5/2021 at 9:11:56 AM    < end of copied text >    ACTIVE PROBLEMS:  HEALTH ISSUES - PROBLEM Dx:  Diabetes mellitus  Diabetes mellitus    Coronary artery disease involving coronary bypass graft of native heart without angina pectoris  Coronary artery disease involving coronary bypass graft of native heart without angina pectoris    Ischemic cardiomyopathy  Ischemic cardiomyopathy    TIA (transient ischemic attack)  TIA (transient ischemic attack)    Near syncope  Near syncope            Assessment and Plan:    In summary,   SHASTA MCKINLEY is an 65y Male with CAD s/p CABG Ischemic DCM with severe biventricular dysfunction,  HFrEF with volume overload, LVEF 20%, s/p life vest ten days ago, Mod not severe MR, Mod pul HTN, DM with Hb A1C above 10.   experienced recurrent episodes of dizziness since recent discharged yesterday wife reported that he was unresponsive for a short period of time as discussed in Guinean over the phone.   No CP or SOB      CDU overnight   Serial cardiac markers and EKgs  Will interrogate Life vest , if no neuro event will need EP evaluation, possible EP Study, will keep NPO after midnight  Continue present cardiac therapy  Discussed with wife over the phone    LILIANA SANDRA MD, [facility], [facility]
65yM was admitted on 01-20 with weakness. Patient states he feels his BP medication may be the cause.     Imaging performed:  HEAD CT 1/20 - Gliosis/encephalomalacia of the right frontal and left occipital 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  No acute intracranial abnormality.    Patient feels well.  States he walked around with therapy and up the stairs.   Feels his weakness is improved and feels back to his baseline.     REVIEW OF SYSTEMS  Constitutional - No fever, No weight loss, No fatigue  HEENT - No eye pain, No visual disturbances, No difficulty hearing, No tinnitus, No vertigo, No neck pain  Respiratory - No cough, No wheezing, No shortness of breath  Cardiovascular - No chest pain, No palpitations  Gastrointestinal - No abdominal pain, No nausea, No vomiting, No diarrhea, No constipation  Genitourinary - No dysuria, No frequency, No hematuria, No incontinence  Neurological - No headaches, No memory loss, No loss of strength, No numbness, No tremors  Skin - No itching, No rashes, No lesions   Endocrine - No temperature intolerance  Musculoskeletal - No joint pain, No joint swelling, No muscle pain  Psychiatric - No depression, No anxiety    VITALS  T(C): 36.6 (01-21-21 @ 07:24), Max: 36.7 (01-20-21 @ 20:53)  HR: 79 (01-21-21 @ 07:24) (79 - 86)  BP: 88/54 (01-21-21 @ 07:24) (88/54 - 110/71)  RR: 18 (01-21-21 @ 07:24) (18 - 18)  SpO2: 97% (01-21-21 @ 07:24) (96% - 97%)  Wt(kg): --    PAST MEDICAL & SURGICAL HISTORY  Abnormal nuclear stress test    Depression    Diabetes    HTN (Hypertension)    CAD (Coronary Artery Disease)    S/P CABG (coronary artery bypass graft)    S/P Inguinal Hernia Repair        SOCIAL HISTORY - as per documentation/history  Smoking - None  EtOH - None  Drugs - None    FUNCTIONAL HISTORY  Lives with family, 12 CJ  Independent    CURRENT FUNCTIONAL STATUS  1/21  Bed Mobility: Rolling/Turning:     · Level of Wright	supervision  · Physical Assist/Nonphysical Assist	supervision    Bed Mobility: Scooting/Bridging:     · Level of Wright	supervision  · Physical Assist/Nonphysical Assist	supervision    Bed Mobility: Sit to Supine:     · Level of Wright	supervision  · Physical Assist/Nonphysical Assist	supervision    Bed Mobility Analysis:     · Bed Mobility Limitations	decreased ability to use legs for bridging/pushing  · Impairments Contributing to Impaired Bed Mobility	decreased strength    Transfer: Chair to Bed:     · Level of Wright	contact guard  · Physical Assist/Nonphysical Assist	verbal cues  · Weight-Bearing Restrictions	full weight-bearing  · Assistive Device	rolling walker    Transfer: Sit to Stand:     · Level of Wright	supervision  · Physical Assist/Nonphysical Assist	supervision  · Weight-Bearing Restrictions	full weight-bearing  · Assistive Device	rolling walker    Transfer: Stand to Sit:     · Level of Wright	supervision  · Physical Assist/Nonphysical Assist	supervision  · Weight-Bearing Restrictions	full weight-bearing  · Assistive Device	rolling walker    Sit/Stand Transfer Safety Analysis:     · Transfer Safety Concerns Noted	decreased sequencing ability  · Impairments Contributing to Impaired Transfers	decreased strength; impaired coordination    Gait Skills:     · Level of Wright	contact guard  · Physical Assist/Nonphysical Assist	1 person assist  · Weight-Bearing Restrictions	full weight-bearing  · Assistive Device	rolling walker  · Gait Distance	50 feet    Gait Analysis:     · Gait Pattern Used	3-point gait  · Gait Deviations Noted	decreased yudelka; decreased step length; decreased velocity of limb motion  · Impairments Contributing to Gait Deviations	decreased strength; impaired coordination; impaired balance    Stair Negotiation:     · Level of Wright	contact guard  · Physical Assist/Nonphysical Assist	1 person assist  · Weight-Bearing Restrictions	full weight-bearing  · Stair Pattern	step over step  · Number of Stairs	12      FAMILY HISTORY       RECENT LABS - Reviewed  CBC Full  -  ( 20 Jan 2021 21:06 )  WBC Count : 7.71 K/uL  RBC Count : 4.92 M/uL  Hemoglobin : 13.8 g/dL  Hematocrit : 41.8 %  Platelet Count - Automated : 226 K/uL  Mean Cell Volume : 85.0 fl  Mean Cell Hemoglobin : 28.0 pg  Mean Cell Hemoglobin Concentration : 33.0 gm/dL  Auto Neutrophil # : 3.81 K/uL  Auto Lymphocyte # : 2.61 K/uL  Auto Monocyte # : 1.07 K/uL  Auto Eosinophil # : 0.14 K/uL  Auto Basophil # : 0.06 K/uL  Auto Neutrophil % : 49.3 %  Auto Lymphocyte % : 33.9 %  Auto Monocyte % : 13.9 %  Auto Eosinophil % : 1.8 %  Auto Basophil % : 0.8 %    01-21    141  |  99  |  22.0<H>  ----------------------------<  186<H>  3.6   |  31.0<H>  |  1.23    Ca    8.6      21 Jan 2021 07:50  Mg     1.9     01-21    TPro  7.6  /  Alb  3.1<L>  /  TBili  0.5  /  DBili  x   /  AST  29  /  ALT  17  /  AlkPhos  115  01-20        ALLERGIES  Toprol-XL (Faint; Other)      MEDICATIONS   acetaminophen   Tablet .. 650 milliGRAM(s) Oral every 6 hours PRN  aspirin enteric coated 81 milliGRAM(s) Oral daily  atorvastatin 40 milliGRAM(s) Oral at bedtime  carvedilol 12.5 milliGRAM(s) Oral every 12 hours  dextrose 40% Gel 15 Gram(s) Oral once  dextrose 5%. 1000 milliLiter(s) IV Continuous <Continuous>  dextrose 5%. 1000 milliLiter(s) IV Continuous <Continuous>  dextrose 50% Injectable 25 Gram(s) IV Push once  dextrose 50% Injectable 12.5 Gram(s) IV Push once  dextrose 50% Injectable 25 Gram(s) IV Push once  furosemide    Tablet 40 milliGRAM(s) Oral daily  gabapentin 100 milliGRAM(s) Oral three times a day  glucagon  Injectable 1 milliGRAM(s) IntraMuscular once  heparin   Injectable 5000 Unit(s) SubCutaneous every 12 hours  insulin glargine Injectable (LANTUS) 20 Unit(s) SubCutaneous at bedtime  insulin lispro (ADMELOG) corrective regimen sliding scale   SubCutaneous three times a day before meals  insulin lispro (ADMELOG) corrective regimen sliding scale   SubCutaneous at bedtime  sacubitril 49 mG/valsartan 51 mG 1 Tablet(s) Oral two times a day  ticagrelor 90 milliGRAM(s) Oral every 12 hours      ----------------------------------------------------------------------------------------  PHYSICAL EXAM  Constitutional - NAD, Comfortable  HEENT - NCAT, EOMI  Neck - Supple, No limited ROM  Chest - Breathing comfortably, No wheezing  Cardiovascular - S1S2   Abdomen - Soft   Extremities - No C/C/E, No calf tenderness   Neurologic Exam -                    Cognitive - AAO to self, place, date, year, situation     Communication - Fluent, No dysarthria     Cranial Nerves - CN 2-12 intact     Motor - No focal deficits                    LEFT    UE - ShAB 5/5, EF 5/5, EE 5/5, WE 5/5,  5/5                    RIGHT UE - ShAB 5/5, EF 5/5, EE 5/5, WE 5/5,  5/5                    LEFT    LE - HF 5/5, KE 5/5, DF 5/5, PF 5/5                    RIGHT LE - HF 5/5, KE 5/5, DF 5/5, PF 5/5        Sensory - Intact to LT     Coordination - FTN slowed  Psychiatric - Mood stable, Affect WNL  ----------------------------------------------------------------------------------------  ASSESSMENT/PLAN  65yMale with functional deficits after feeling weak  TIA/CAD - ASA, Brilinta, Lipitor  HTN - Coreg, Lasix  DM2 - Lantus  Pain - Tylenol, Neurontin  DVT PPX - SCDs, Heparin  Rehab - Patient at supervision level. Recommend DC HOME. Continue bedside therapy as well as OOB throughout the day with mobilization by staff to maintain cardiopulmonary function and prevention of secondary complications related to debility.     Discussed with rehab team. 
                [facility], [facility]                                   [address] [place] [address]                                                      PHONE: [phone]                                                         FAX: [phone]  [email or web address]  ---------------------------------------------------------------------------------------------------------------------------------    Reason for Consult:    HPI:  SHASTA MCKINLEY is an 65y Male with hx CAD, CABG 2010, ischemic cardiomyopathy, moderate MR by [facility], noted EF 33% with scar/minimal periinfarct ischemia by nuclear stress at [facility] 10/2/2020 (had been lost to follow up for years prior to that, was on ARB and B-blocker at the time), was seen by Jose Silva in our office, ARB was changed to Entresto, was admitted here 1/4 with CHF exacerbation, EF noted 20% by echo, ultimately had cath , native LCx was stented, LIMA was patent.  Lifevest was ordered, he has been wearing.  Now presenting with recurrent lightheadedness, near syncope, possible unresponsiveness reported by wife.  Pt having runs of NSVT to 15 seconds in ER on tele.  Reports lightheadedness in ER as well, but attributes it to medicines.  Denies CP, SOB, palpitations.    PAST MEDICAL & SURGICAL HISTORY:  Abnormal nuclear stress test    Depression    Diabetes    HTN (Hypertension)  controled with meds    CAD (Coronary Artery Disease)  LAD occlusion    S/P CABG (coronary artery bypass graft)    S/P Inguinal Hernia Repair  ishaan with mesh 2008        Toprol-XL (Faint; Other)      MEDICATIONS  (STANDING):  aspirin enteric coated 81 milliGRAM(s) Oral daily  atorvastatin 40 milliGRAM(s) Oral at bedtime  carvedilol 12.5 milliGRAM(s) Oral every 12 hours  dextrose 40% Gel 15 Gram(s) Oral once  dextrose 5%. 1000 milliLiter(s) (50 mL/Hr) IV Continuous <Continuous>  dextrose 5%. 1000 milliLiter(s) (100 mL/Hr) IV Continuous <Continuous>  dextrose 50% Injectable 25 Gram(s) IV Push once  dextrose 50% Injectable 12.5 Gram(s) IV Push once  dextrose 50% Injectable 25 Gram(s) IV Push once  gabapentin 100 milliGRAM(s) Oral three times a day  glucagon  Injectable 1 milliGRAM(s) IntraMuscular once  heparin   Injectable 5000 Unit(s) SubCutaneous every 12 hours  insulin glargine Injectable (LANTUS) 20 Unit(s) SubCutaneous at bedtime  insulin lispro (ADMELOG) corrective regimen sliding scale   SubCutaneous three times a day before meals  insulin lispro (ADMELOG) corrective regimen sliding scale   SubCutaneous at bedtime  sacubitril 49 mG/valsartan 51 mG 1 Tablet(s) Oral two times a day  ticagrelor 90 milliGRAM(s) Oral every 12 hours    MEDICATIONS  (PRN):  acetaminophen   Tablet .. 650 milliGRAM(s) Oral every 6 hours PRN Temp greater or equal to 38C (100.4F), Mild Pain (1 - 3)      Social History:  Cigarettes:  no                  Alchohol:   no             Illicit Drug Abuse:  no    ROS: Negative other than as mentioned in HPI.    Vital Signs Last 24 Hrs  T(C): 36.3 (22 Jan 2021 15:24), Max: 36.8 (22 Jan 2021 11:58)  T(F): 97.3 (22 Jan 2021 15:24), Max: 98.3 (22 Jan 2021 11:58)  HR: 83 (22 Jan 2021 15:24) (73 - 83)  BP: 123/70 (22 Jan 2021 15:24) (77/55 - 123/70)  BP(mean): 87 (22 Jan 2021 04:29) (87 - 87)  RR: 20 (22 Jan 2021 15:24) (20 - 20)  SpO2: 97% (22 Jan 2021 15:24) (97% - 100%)  ICU Vital Signs Last 24 Hrs  SHASTA MCKINLEY  I&O's Detail    I&O's Summary    Drug Dosing Weight  SHASTA MCKINLEY      PHYSICAL EXAM:  General: Appears well developed, well nourished alert and cooperative.  HEENT: Head; normocephalic, atraumatic.  Eyes: Pupils reactive, cornea wnl.  Neck: Supple, no nodes adenopathy, no NVD or carotid bruit or thyromegaly.  CARDIOVASCULAR: Normal S1 and S2, No murmur, rub, gallop or lift.   LUNGS: No rales, rhonchi or wheeze. Normal breath sounds bilaterally.  ABDOMEN: Soft, nontender without mass or organomegaly. bowel sounds normoactive.  EXTREMITIES: No clubbing, cyanosis or edema. Distal pulses wnl.   SKIN: warm and dry with normal turgor.  NEURO: Alert/oriented x 3/normal motor exam. No pathologic reflexes.    PSYCH: normal affect.        LABS:                        13.3   6.37  )-----------( 236      ( 22 Jan 2021 07:37 )             42.0     01-22    140  |  99  |  26.0<H>  ----------------------------<  97  3.5   |  33.0<H>  |  1.26    Ca    9.0      22 Jan 2021 07:33  Mg     2.0     01-22    TPro  7.5  /  Alb  3.3  /  TBili  0.6  /  DBili  x   /  AST  28  /  ALT  17  /  AlkPhos  104  01-22    [facility]  CARDIAC MARKERS ( 22 Jan 2021 07:33 )  x     / 0.05 ng/mL / x     / x     / x      CARDIAC MARKERS ( 21 Jan 2021 07:50 )  x     / 0.05 ng/mL / x     / x     / x      CARDIAC MARKERS ( 21 Jan 2021 00:13 )  x     / 0.04 ng/mL / x     / x     / x      CARDIAC MARKERS ( 20 Jan 2021 21:06 )  x     / 0.05 ng/mL / x     / x     / x          PT/INR - ( 20 Jan 2021 21:06 )   PT: 14.4 sec;   INR: 1.25 ratio         PTT - ( 20 Jan 2021 21:06 )  PTT:33.3 sec      RADIOLOGY & ADDITIONAL STUDIES:    INTERPRETATION OF TELEMETRY (personally reviewed):    ECG: SR, normal intervals, nonspecific ST-T abnormalities    ECHO: HUSEYIN EF 20% mod MR    CARDIAC CATHETERIZATION:    Assessment and Plan:  In summary, SHASTA MCKINLEY is an 65y Male with past medical history significant for longstanding CAD and ischemic cardiomyopathy with severe systolic dysfunction, no improvement in EF despite more than 3 mos B-blocker and Entresto, recent PCI to LCx, now presenting with recurrent dizziness/near syncope and long runs NSVT seen on tele.  Recommend ICD implantation at this point given cardiomyopathy unlikely to improve, dizziness, and substantial VT burden on tele.  May need reduction in Entresto given occasional hypotension noted here.  Discussed ICD implantation with pt including nature of device and implantation procedure, risks benefits alternatives, all questions answered, he wishes to proceed, will plan for today.

## 2021-01-22 NOTE — PROGRESS NOTE ADULT - SUBJECTIVE AND OBJECTIVE BOX
[facility], THE [facility]                                   [address] [place] [address]                                                      PHONE: [phone]                                                         FAX: [phone]  [email or web address]  ---------------------------------------------------------------------------------------------------------------------------------    Reason for Consult: SYNCOPE    HPI:  SHASTA MCKINLEY is an 65y Male with CAD s/p CABG Ischemic DCM with severe biventricular dysfunction,  HFrEF with volume overload, LVEF 20%, s/p life vest ten days ago, Mod not severe MR, Mod pul HTN, DM with Hb A1C above 10.   experienced recurrent episodes of dizziness since recent discharged yesterday wife reported that he was unresponsive for a short period of time as discussed in [place] over the phone.   No CP or SOB      RECENT EVENTS    Episode of NSVT today in tele      PAST MEDICAL & SURGICAL HISTORY:  Abnormal nuclear stress test    Depression    Diabetes    HTN (Hypertension)  controled with meds    CAD (Coronary Artery Disease)  LAD occlusion    S/P CABG (coronary artery bypass graft)    S/P Inguinal Hernia Repair  ishaan with mesh 2008        Toprol-XL (Faint; Other)      MEDICATIONS  (STANDING):  aspirin enteric coated 81 milliGRAM(s) Oral daily  atorvastatin 40 milliGRAM(s) Oral at bedtime  carvedilol 12.5 milliGRAM(s) Oral every 12 hours  dextrose 40% Gel 15 Gram(s) Oral once  dextrose 5%. 1000 milliLiter(s) (50 mL/Hr) IV Continuous <Continuous>  dextrose 5%. 1000 milliLiter(s) (100 mL/Hr) IV Continuous <Continuous>  dextrose 50% Injectable 25 Gram(s) IV Push once  dextrose 50% Injectable 12.5 Gram(s) IV Push once  dextrose 50% Injectable 25 Gram(s) IV Push once  furosemide    Tablet 40 milliGRAM(s) Oral daily  gabapentin 100 milliGRAM(s) Oral three times a day  glucagon  Injectable 1 milliGRAM(s) IntraMuscular once  heparin   Injectable 5000 Unit(s) SubCutaneous every 12 hours  insulin glargine Injectable (LANTUS) 20 Unit(s) SubCutaneous at bedtime  insulin lispro (ADMELOG) corrective regimen sliding scale   SubCutaneous three times a day before meals  insulin lispro (ADMELOG) corrective regimen sliding scale   SubCutaneous at bedtime  sacubitril 49 mG/valsartan 51 mG 1 Tablet(s) Oral two times a day  ticagrelor 90 milliGRAM(s) Oral every 12 hours    MEDICATIONS  (PRN):  acetaminophen   Tablet .. 650 milliGRAM(s) Oral every 6 hours PRN Temp greater or equal to 38C (100.4F), Mild Pain (1 - 3)      Social History:  Cigarettes:                    Alchohol:                 Illicit Drug Abuse:      REVIEW OF SYSTEMS:    Constitutional: No fever, weight loss or fatigue  Eyes: No eye pain, visual disturbances, or discharge  ENMT:  No difficulty hearing, tinnitus, vertigo; No sinus or throat pain  Neck: No pain or stiffness  Respiratory: No cough, wheezing, chills or hemoptysis  Cardiovascular: No chest pain, palpitations, shortness of breath, dizziness or leg swelling  Gastrointestinal: No abdominal or epigastric pain. No nausea, vomiting or hematemesis; No diarrhea or constipation. No melena or hematochezia.  Genitourinary: No dysuria, frequency, hematuria or incontinence  Rectal: No pain, hemorrhoids or incontinence  Neurological: No headaches, memory loss, loss of strength, numbness or tremors  Skin: No itching, burning, rashes or lesions   Lymph Nodes: No enlarged glands  Endocrine: No heat or cold intolerance; No hair loss  Musculoskeletal: No joint pain or swelling; No muscle, back or extremity pain  Psychiatric: No depression, anxiety, mood swings or difficulty sleeping  Heme/Lymph: No easy bruising or bleeding gums  Allergy and Immunologic: No hives or eczema    Vital Signs Last 24 Hrs  T(C): 36.4 (21 Jan 2021 15:05), Max: 36.7 (20 Jan 2021 20:53)  T(F): 97.6 (21 Jan 2021 15:05), Max: 98.1 (20 Jan 2021 20:53)  HR: 78 (21 Jan 2021 15:05) (78 - 86)  BP: 132/78 (21 Jan 2021 15:05) (88/54 - 132/78)  BP(mean): --  RR: 20 (21 Jan 2021 15:05) (18 - 20)  SpO2: 98% (21 Jan 2021 15:05) (96% - 98%)  ICU Vital Signs Last 24 Hrs  SHASTA MCKINLEY  I&O's Detail    I&O's Summary    Drug Dosing Weight  SHASTA MCKINLEY      PHYSICAL EXAM:  General: Appears well developed, well nourished alert and cooperative.  HEENT: Head; normocephalic, atraumatic.  Eyes: Pupils reactive, cornea wnl.  Neck: Supple, no nodes adenopathy, no NVD or carotid bruit or thyromegaly.  CARDIOVASCULAR: Normal S1 and S2, No murmur, rub, gallop or lift.   LUNGS: No rales, rhonchi or wheeze. Normal breath sounds bilaterally.  ABDOMEN: Soft, nontender without mass or organomegaly. bowel sounds normoactive.  EXTREMITIES: No clubbing, cyanosis or edema. Distal pulses wnl.   SKIN: warm and dry with normal turgor.  NEURO: Alert/oriented x 3/normal motor exam. No pathologic reflexes.    PSYCH: normal affect.        LABS:                        13.8   7.71  )-----------( 226      ( 20 Jan 2021 21:06 )             41.8     01-21    141  |  99  |  22.0<H>  ----------------------------<  186<H>  3.6   |  31.0<H>  |  1.23    Ca    8.6      21 Jan 2021 07:50  Mg     1.9     01-21    TPro  7.6  /  Alb  3.1<L>  /  TBili  0.5  /  DBili  x   /  AST  29  /  ALT  17  /  AlkPhos  115  01-20    SHASTA MCKINLEY  CARDIAC MARKERS ( 21 Jan 2021 07:50 )  x     / 0.05 ng/mL / x     / x     / x      CARDIAC MARKERS ( 21 Jan 2021 00:13 )  x     / 0.04 ng/mL / x     / x     / x      CARDIAC MARKERS ( 20 Jan 2021 21:06 )  x     / 0.05 ng/mL / x     / x     / x          PT/INR - ( 20 Jan 2021 21:06 )   PT: 14.4 sec;   INR: 1.25 ratio         PTT - ( 20 Jan 2021 21:06 )  PTT:33.3 sec      RADIOLOGY & ADDITIONAL STUDIES:    INTERPRETATION OF TELEMETRY (personally reviewed):    ECG: NSR     < from: Cardiac Cath Lab - Adult (01.04.21 @ 15:52) >  VENTRICLES: EF 30% w/ inferior akinesis and global hypokinesis w/  moderate-severe MR.  CORONARY VESSELS: R dominant.  Moderate sized RCA w/ proximal occlusion and distal vessel fills via L  collaterals.  Mild LM disease.  Occluded LAD w/ large vessel filling from graft.  Large LCx w/ 80% proximal stenosis w/ large distal vessel w/ mild disease.  Grafts-1. LIMA to LAD-patent.  CX:   --  Proximal circumflex: There was a 80 % stenosis.  COMPLICATIONS: No complications occurred during the cath lab visit. No  complications occurred during the cath lab visit.  DIAGNOSTIC IMPRESSIONS: Severe pulmonary HTN.  ICS x 1 to LCx w/ 0% residual stenosis and CARLEEN III flow maintained pre and  post PCI.  IVUS of LCx.  Patent LIMA to LAD.  Severe LV dysfunction w/ moderate-severe MR.  DIAGNOSTIC RECOMMENDATIONS: Asa/brilinta.  Followup at [facility] in 1-2 weeks/?HUSEYIN to assess for mitraclip ( for fxnal  MR/as per COAPT trial ).  INTERVENTIONAL IMPRESSIONS: Severe pulmonary HTN.  ICS x 1 to LCx w/ 0% residual stenosis and CARLEEN III flow maintained pre and  post PCI.  IVUS of LCx.  Patent LIMA to LAD.  Severe LV dysfunction w/ moderate-severe MR.  INTERVENTIONAL RECOMMENDATIONS: Asa/brilinta.  Followup at [facility] in 1-2 weeks/?HUSEYIN to assess for mitraclip ( for fxnal  MR/as per COAPT trial ).  Prepared and signed by  Efren Hernandez MD    < end of copied text >      < from: HUSEYIN Echo Doppler (01.05.21 @ 08:34) >  Summary:   1. Left ventricular ejection fraction, by visual estimation, is 20 to 25%.   2. Multiple left ventricular regional wall motion abnormalities exist. See wall motion findings.   3. Moderate to severe right atrial enlargement.   4. Severely enlarged left atrium.   5. Elevated mean left atrial pressure.   6. Mildly increased LV wall thickness.   7. Spectral Doppler shows restrictive pattern of left ventricular myocardial filling (Grade III diastolic dysfunction).   8. There is mild concentric left ventricular hypertrophy.   9. Moderately enlarged right ventricle.  10. Severely reduced RV systolic function.  11. Severely en;larged LA with SEC in LA and LAAA w/o thrombus.  12. Moderate pleural effusion in the left lateral region.  13. Moderate mitral valve regurgitation.  14. Moderate MR by color flow, PISA ERO 0.2 cm2, no evidence of pul vein reversal.  15. Moderate tricuspid regurgitation.  16. Mild pulmonic valve regurgitation.  17. Estimated pulmonary artery systolic pressure is 50.4 mmHg assuming a right atrial pressure of 12 mmHg, which is consistent with moderate pulmonary hypertension.  18. Color flow doppler and intravenous injection of agitated saline demonstrates the presence of an intact intra atrial septum.    MD Naren Electronically signed on 1/5/2021 at 9:11:56 AM    < end of copied text >    ACTIVE PROBLEMS:  HEALTH ISSUES - PROBLEM Dx:  Diabetes mellitus  Diabetes mellitus    Coronary artery disease involving coronary bypass graft of native heart without angina pectoris  Coronary artery disease involving coronary bypass graft of native heart without angina pectoris    Ischemic cardiomyopathy  Ischemic cardiomyopathy    TIA (transient ischemic attack)  TIA (transient ischemic attack)    Near syncope  Near syncope            Assessment and Plan:    In summary,   SHASTA MCKINLEY is an 65y Male with CAD s/p CABG Ischemic DCM with severe biventricular dysfunction,  HFrEF with volume overload, LVEF 20%, s/p life vest ten days ago, Mod not severe MR, Mod pul HTN, DM with Hb A1C above 10.   experienced recurrent episodes of dizziness since recent discharged yesterday wife reported that he was unresponsive for a short period of time as discussed in [place] over the phone.   No CP or SOB      Telemetry   EP evaluation Recurrent syncope  NSVT in tele Hx of CABG ischemic CMP LVEF 20 %  , possible EP Study, will keep NPO   Continue present cardiac therapy      LILIANA SANDRA MD, [facility], Critical access hospital

## 2021-01-22 NOTE — PROGRESS NOTE ADULT - ASSESSMENT
66 yo man with episode of presyncope likely 2/2 orthostatic hypotension.    Dizziness/orthostasis.  Seen by cardiology.   Further evaluations in progress.   Agree that unlikely primary neurologic event.     Vision  Right eye decreased vision since Lasik.   He should follow up with eye doctor as outpatient.     No further specific neurologic recommendations. Will be available as needed.     Discussed with Dr Alexander.

## 2021-01-23 LAB
ANION GAP SERPL CALC-SCNC: 8 MMOL/L — SIGNIFICANT CHANGE UP (ref 5–17)
APTT BLD: 35.6 SEC — HIGH (ref 27.5–35.5)
BASOPHILS # BLD AUTO: 0.05 K/UL — SIGNIFICANT CHANGE UP (ref 0–0.2)
BASOPHILS NFR BLD AUTO: 0.6 % — SIGNIFICANT CHANGE UP (ref 0–2)
BUN SERPL-MCNC: 28 MG/DL — HIGH (ref 8–20)
CALCIUM SERPL-MCNC: 8.6 MG/DL — SIGNIFICANT CHANGE UP (ref 8.6–10.2)
CHLORIDE SERPL-SCNC: 101 MMOL/L — SIGNIFICANT CHANGE UP (ref 98–107)
CO2 SERPL-SCNC: 33 MMOL/L — HIGH (ref 22–29)
CREAT SERPL-MCNC: 1.14 MG/DL — SIGNIFICANT CHANGE UP (ref 0.5–1.3)
EOSINOPHIL # BLD AUTO: 0.09 K/UL — SIGNIFICANT CHANGE UP (ref 0–0.5)
EOSINOPHIL NFR BLD AUTO: 1.1 % — SIGNIFICANT CHANGE UP (ref 0–6)
GLUCOSE BLDC GLUCOMTR-MCNC: 103 MG/DL — HIGH (ref 70–99)
GLUCOSE BLDC GLUCOMTR-MCNC: 144 MG/DL — HIGH (ref 70–99)
GLUCOSE BLDC GLUCOMTR-MCNC: 174 MG/DL — HIGH (ref 70–99)
GLUCOSE BLDC GLUCOMTR-MCNC: 68 MG/DL — LOW (ref 70–99)
GLUCOSE BLDC GLUCOMTR-MCNC: 88 MG/DL — SIGNIFICANT CHANGE UP (ref 70–99)
GLUCOSE SERPL-MCNC: 116 MG/DL — HIGH (ref 70–99)
HCT VFR BLD CALC: 37.1 % — LOW (ref 39–50)
HGB BLD-MCNC: 12.5 G/DL — LOW (ref 13–17)
IMM GRANULOCYTES NFR BLD AUTO: 0.1 % — SIGNIFICANT CHANGE UP (ref 0–1.5)
INR BLD: 1.2 RATIO — HIGH (ref 0.88–1.16)
LYMPHOCYTES # BLD AUTO: 2.05 K/UL — SIGNIFICANT CHANGE UP (ref 1–3.3)
LYMPHOCYTES # BLD AUTO: 25.9 % — SIGNIFICANT CHANGE UP (ref 13–44)
MCHC RBC-ENTMCNC: 28.3 PG — SIGNIFICANT CHANGE UP (ref 27–34)
MCHC RBC-ENTMCNC: 33.7 GM/DL — SIGNIFICANT CHANGE UP (ref 32–36)
MCV RBC AUTO: 84.1 FL — SIGNIFICANT CHANGE UP (ref 80–100)
MONOCYTES # BLD AUTO: 0.7 K/UL — SIGNIFICANT CHANGE UP (ref 0–0.9)
MONOCYTES NFR BLD AUTO: 8.8 % — SIGNIFICANT CHANGE UP (ref 2–14)
NEUTROPHILS # BLD AUTO: 5.01 K/UL — SIGNIFICANT CHANGE UP (ref 1.8–7.4)
NEUTROPHILS NFR BLD AUTO: 63.5 % — SIGNIFICANT CHANGE UP (ref 43–77)
PLATELET # BLD AUTO: 205 K/UL — SIGNIFICANT CHANGE UP (ref 150–400)
POTASSIUM SERPL-MCNC: 3.1 MMOL/L — LOW (ref 3.5–5.3)
POTASSIUM SERPL-SCNC: 3.1 MMOL/L — LOW (ref 3.5–5.3)
PROTHROM AB SERPL-ACNC: 13.8 SEC — HIGH (ref 10.6–13.6)
RBC # BLD: 4.41 M/UL — SIGNIFICANT CHANGE UP (ref 4.2–5.8)
RBC # FLD: 14.1 % — SIGNIFICANT CHANGE UP (ref 10.3–14.5)
SODIUM SERPL-SCNC: 142 MMOL/L — SIGNIFICANT CHANGE UP (ref 135–145)
WBC # BLD: 7.91 K/UL — SIGNIFICANT CHANGE UP (ref 3.8–10.5)
WBC # FLD AUTO: 7.91 K/UL — SIGNIFICANT CHANGE UP (ref 3.8–10.5)

## 2021-01-23 PROCEDURE — 99233 SBSQ HOSP IP/OBS HIGH 50: CPT

## 2021-01-23 PROCEDURE — 93010 ELECTROCARDIOGRAM REPORT: CPT

## 2021-01-23 RX ORDER — CARVEDILOL PHOSPHATE 80 MG/1
3.12 CAPSULE, EXTENDED RELEASE ORAL EVERY 12 HOURS
Refills: 0 | Status: DISCONTINUED | OUTPATIENT
Start: 2021-01-23 | End: 2021-01-25

## 2021-01-23 RX ORDER — SACUBITRIL AND VALSARTAN 24; 26 MG/1; MG/1
1 TABLET, FILM COATED ORAL
Refills: 0 | Status: DISCONTINUED | OUTPATIENT
Start: 2021-01-23 | End: 2021-01-25

## 2021-01-23 RX ORDER — POTASSIUM CHLORIDE 20 MEQ
40 PACKET (EA) ORAL EVERY 4 HOURS
Refills: 0 | Status: COMPLETED | OUTPATIENT
Start: 2021-01-23 | End: 2021-01-23

## 2021-01-23 RX ADMIN — Medication 100 MILLIGRAM(S): at 04:48

## 2021-01-23 RX ADMIN — Medication 650 MILLIGRAM(S): at 04:50

## 2021-01-23 RX ADMIN — ATORVASTATIN CALCIUM 40 MILLIGRAM(S): 80 TABLET, FILM COATED ORAL at 20:56

## 2021-01-23 RX ADMIN — Medication 81 MILLIGRAM(S): at 12:34

## 2021-01-23 RX ADMIN — TICAGRELOR 90 MILLIGRAM(S): 90 TABLET ORAL at 06:57

## 2021-01-23 RX ADMIN — CARVEDILOL PHOSPHATE 3.12 MILLIGRAM(S): 80 CAPSULE, EXTENDED RELEASE ORAL at 17:12

## 2021-01-23 RX ADMIN — TICAGRELOR 90 MILLIGRAM(S): 90 TABLET ORAL at 17:12

## 2021-01-23 RX ADMIN — GABAPENTIN 100 MILLIGRAM(S): 400 CAPSULE ORAL at 13:55

## 2021-01-23 RX ADMIN — Medication 650 MILLIGRAM(S): at 14:12

## 2021-01-23 RX ADMIN — SACUBITRIL AND VALSARTAN 1 TABLET(S): 24; 26 TABLET, FILM COATED ORAL at 18:35

## 2021-01-23 RX ADMIN — Medication 2: at 16:52

## 2021-01-23 RX ADMIN — Medication 40 MILLIEQUIVALENT(S): at 20:56

## 2021-01-23 RX ADMIN — INSULIN GLARGINE 20 UNIT(S): 100 INJECTION, SOLUTION SUBCUTANEOUS at 21:10

## 2021-01-23 RX ADMIN — GABAPENTIN 100 MILLIGRAM(S): 400 CAPSULE ORAL at 20:56

## 2021-01-23 RX ADMIN — Medication 40 MILLIEQUIVALENT(S): at 17:11

## 2021-01-23 RX ADMIN — Medication 650 MILLIGRAM(S): at 23:31

## 2021-01-23 RX ADMIN — HEPARIN SODIUM 5000 UNIT(S): 5000 INJECTION INTRAVENOUS; SUBCUTANEOUS at 17:12

## 2021-01-23 RX ADMIN — GABAPENTIN 100 MILLIGRAM(S): 400 CAPSULE ORAL at 06:57

## 2021-01-23 RX ADMIN — Medication 100 MILLIGRAM(S): at 12:34

## 2021-01-23 RX ADMIN — Medication 500 MILLIGRAM(S): at 17:12

## 2021-01-23 NOTE — PROGRESS NOTE ADULT - SUBJECTIVE AND OBJECTIVE BOX
Called to evaluate for 30 second episode while walking to bathroom.  Nurse was walking patient into the bathroom, patient stared to stare in one direction and was not responding appropriately to RN commands.  RN called another nurse for help and both held the patient up for about 30 seconds then patient became responsive and walked back to bed.  VS stable, no changes on monitor.     Denies sob, chest pain, n/v/d, palpitations cough, headache dizziness or pain.  Patient states he remembering feeling weak.  Upon assessment no new neurological deficits noted.  Patient also examined by PCP.  PCP lowered Coreg 3.125 and lowered Entresto.  Also d/w with cardio and made aware of plan of care.  No new recommendations      Syncopable episode  MD aware   Cardio aware  Orthostatic blood pressures ordered.   Lying down bp 142/80 MAp 92                                                            Standing up BP 84/54, Map 61    MD lowered bp meds  encourage po fluids  Patient states hes feeling better.       Called to evaluate for 30 second episode while walking to bathroom.  Nurse was walking patient into the bathroom, patient started to stare in one direction and was not responding appropriately to RN commands.  RN called another nurse for help and both held the patient up for about 30 seconds then patient became responsive and walked back to bed.  VS stable, no changes on monitor.     Denies sob, chest pain, n/v/d, palpitations cough, headache dizziness or pain.  Patient states he remembering feeling weak.  Upon assessment no new neurological deficits noted.  Patient also examined by PCP.  PCP lowered Coreg 3.125 and lowered Entresto.  Also d/w with cardio and made aware of plan of care.  No new recommendations      Syncopable episode  MD aware   Cardio aware  Orthostatic blood pressures ordered.   Lying down bp 142/80 MAp 92                                                            Standing up BP 84/54, Map 61    MD lowered bp meds  encourage po fluids  Patient states hes feeling better.

## 2021-01-23 NOTE — PROGRESS NOTE ADULT - SUBJECTIVE AND OBJECTIVE BOX
Pt seen and evaluated POD #1 s/p MDT single chamber ICD implant.  RN reports overnight event of hypotension SBP 70's with associated dizziness and fatigue.  Pt received 250cc IVF bolus with blood pressure improvement to the mid-90s.  pt stated he was feeling "ok" and requested to use the bathroom.  RN assist with ambulation but pt became weak, c/o dizziness.  Again, SBP 73 with no events noted on telemetry.  Pt assisted back to bed.  Now feeling very tired.         EKG: sinus rhythm 79 bpm, normal axis, T wave abnormality lateral wall   TELE: no significant events noted   Device interrogation: measurements appropriate and stable; parameters confirmed  CXR: pending     Device setting: VVI 40 right ventricular output 3.5V @ 0.4ms, ventricular sensitivity 0.3mV   Zone VF Rate 231 Therapies ATP during charging, 40J X 6  Zone FVT Rate 188 Therapies iATP X 5, 40J X 5  Zone VT Rate 150 Therapies iATP X 10, 40J X 5  Zone VT Rate 133 monitor     MEDICATIONS  (STANDING):  aspirin enteric coated 81 milliGRAM(s) Oral daily  atorvastatin 40 milliGRAM(s) Oral at bedtime  carvedilol 12.5 milliGRAM(s) Oral every 12 hours  ceFAZolin   IVPB 2000 milliGRAM(s) IV Intermittent every 8 hours  cephalexin 500 milliGRAM(s) Oral every 12 hours  dextrose 40% Gel 15 Gram(s) Oral once  dextrose 5%. 1000 milliLiter(s) (50 mL/Hr) IV Continuous <Continuous>  dextrose 5%. 1000 milliLiter(s) (100 mL/Hr) IV Continuous <Continuous>  dextrose 50% Injectable 25 Gram(s) IV Push once  dextrose 50% Injectable 12.5 Gram(s) IV Push once  dextrose 50% Injectable 25 Gram(s) IV Push once  gabapentin 100 milliGRAM(s) Oral three times a day  glucagon  Injectable 1 milliGRAM(s) IntraMuscular once  heparin   Injectable 5000 Unit(s) SubCutaneous every 12 hours  insulin glargine Injectable (LANTUS) 20 Unit(s) SubCutaneous at bedtime  insulin lispro (ADMELOG) corrective regimen sliding scale   SubCutaneous three times a day before meals  insulin lispro (ADMELOG) corrective regimen sliding scale   SubCutaneous at bedtime  sacubitril 49 mG/valsartan 51 mG 1 Tablet(s) Oral two times a day  ticagrelor 90 milliGRAM(s) Oral every 12 hours    MEDICATIONS  (PRN):  acetaminophen   Tablet .. 650 milliGRAM(s) Oral every 6 hours PRN Temp greater or equal to 38C (100.4F), Mild Pain (1 - 3)    Allergies:  Toprol-XL (Faint; Other)    PAST MEDICAL & SURGICAL HISTORY:  Abnormal nuclear stress test  Depression  Diabetes  HTN (Hypertension)   CAD (Coronary Artery Disease) LAD occlusion  S/P CABG (coronary artery bypass graft)  S/P Inguinal Hernia Repair ishaan with mesh 2008    Vital Signs Last 24 Hrs  T(C): 36.3 (22 Jan 2021 15:24), Max: 36.8 (22 Jan 2021 11:58)  T(F): 97.3 (22 Jan 2021 15:24), Max: 98.3 (22 Jan 2021 11:58)  HR: 72 (23 Jan 2021 08:53) (68 - 89)  BP: 73/53 (23 Jan 2021 08:53) (73/50 - 149/85)  RR: 16 (23 Jan 2021 07:38) (9 - 20)  SpO2: 97% (23 Jan 2021 07:38) (97% - 100%)    Physical Exam:  Constitutional: awake, no acute distress  Cardiovascular: +S1S2 RRR  Chest: LACW tegaderm dressing c/d/i   Pulmonary: CTA b/l, unlabored  Abd: soft NTND +BS  Extremities: no pedal edema, +distal pulses b/l  Neuro: non focal, WREN x4    LABS:                        12.5   7.91  )-----------( 205      ( 23 Jan 2021 05:03 )             37.1     01-23    142  |  101  |  28.0<H>  ----------------------------<  116<H>  3.1<L>   |  33.0<H>  |  1.14    Ca    8.6      23 Jan 2021 05:03  Mg     2.0     01-22    TPro  7.5  /  Alb  3.3  /  TBili  0.6  /  DBili  x   /  AST  28  /  ALT  17  /  AlkPhos  104  01-22    Assessment:   65 year old male with history of NSVT, syncope, CAD s/p CABG and EF 20% now POD # 1 s/p MDT single chamber ICD implant for secondary prevention.      Plan:   Keep site clean and dry for 3 days.    Keflex 500mg PO TID X 5 days.   Pt instructed as to ROM of affected arm - no lifting/pushing/pulling >10 lbs & shoulder ROM limited to 90deg & below x 6 weeks.   Pending final read of CXR, no barriers to d/c from EP service perspective.   Access site care and activity limitations reviewed w/ pt.   [facility] to follow.   Dispo per primary team.    Pt seen and evaluated POD #1 s/p MDT single chamber ICD implant.  RN reports overnight event of hypotension SBP 70's with associated dizziness and fatigue.  Pt received 250cc IVF bolus with blood pressure improvement to the mid-90s.  pt stated he was feeling "ok" and requested to use the bathroom.  RN assist with ambulation but pt became weak, c/o dizziness.  Again, SBP 73 with no events noted on telemetry.  Pt assisted back to bed.  Now feeling very tired.         EKG: sinus rhythm 79 bpm, normal axis, T wave abnormality lateral wall   TELE: no significant events noted   Device interrogation: measurements appropriate and stable; parameters confirmed  CXR: pending     Device setting: VVI 40 right ventricular output 3.5V @ 0.4ms, ventricular sensitivity 0.3mV   Zone VF Rate 231 Therapies ATP during charging, 40J X 6  Zone FVT Rate 188 Therapies iATP X 5, 40J X 5  Zone VT Rate 150 Therapies iATP X 10, 40J X 5  Zone VT Rate 133 monitor     MEDICATIONS  (STANDING):  aspirin enteric coated 81 milliGRAM(s) Oral daily  atorvastatin 40 milliGRAM(s) Oral at bedtime  carvedilol 12.5 milliGRAM(s) Oral every 12 hours  ceFAZolin   IVPB 2000 milliGRAM(s) IV Intermittent every 8 hours  cephalexin 500 milliGRAM(s) Oral every 12 hours  dextrose 40% Gel 15 Gram(s) Oral once  dextrose 5%. 1000 milliLiter(s) (50 mL/Hr) IV Continuous <Continuous>  dextrose 5%. 1000 milliLiter(s) (100 mL/Hr) IV Continuous <Continuous>  dextrose 50% Injectable 25 Gram(s) IV Push once  dextrose 50% Injectable 12.5 Gram(s) IV Push once  dextrose 50% Injectable 25 Gram(s) IV Push once  gabapentin 100 milliGRAM(s) Oral three times a day  glucagon  Injectable 1 milliGRAM(s) IntraMuscular once  heparin   Injectable 5000 Unit(s) SubCutaneous every 12 hours  insulin glargine Injectable (LANTUS) 20 Unit(s) SubCutaneous at bedtime  insulin lispro (ADMELOG) corrective regimen sliding scale   SubCutaneous three times a day before meals  insulin lispro (ADMELOG) corrective regimen sliding scale   SubCutaneous at bedtime  sacubitril 49 mG/valsartan 51 mG 1 Tablet(s) Oral two times a day  ticagrelor 90 milliGRAM(s) Oral every 12 hours    MEDICATIONS  (PRN):  acetaminophen   Tablet .. 650 milliGRAM(s) Oral every 6 hours PRN Temp greater or equal to 38C (100.4F), Mild Pain (1 - 3)    Allergies:  Toprol-XL (Faint; Other)    PAST MEDICAL & SURGICAL HISTORY:  Abnormal nuclear stress test  Depression  Diabetes  HTN (Hypertension)   CAD (Coronary Artery Disease) LAD occlusion  S/P CABG (coronary artery bypass graft)  S/P Inguinal Hernia Repair ishaan with mesh 2008    Vital Signs Last 24 Hrs  T(C): 36.3 (22 Jan 2021 15:24), Max: 36.8 (22 Jan 2021 11:58)  T(F): 97.3 (22 Jan 2021 15:24), Max: 98.3 (22 Jan 2021 11:58)  HR: 72 (23 Jan 2021 08:53) (68 - 89)  BP: 73/53 (23 Jan 2021 08:53) (73/50 - 149/85)  RR: 16 (23 Jan 2021 07:38) (9 - 20)  SpO2: 97% (23 Jan 2021 07:38) (97% - 100%)    Physical Exam:  Constitutional: awake, no acute distress  Cardiovascular: +S1S2 RRR  Chest: LACW tegaderm dressing c/d/i   Pulmonary: CTA b/l, unlabored  Abd: soft NTND +BS  Extremities: no pedal edema, +distal pulses b/l  Neuro: non focal, WREN x4    LABS:                        12.5   7.91  )-----------( 205      ( 23 Jan 2021 05:03 )             37.1     01-23    142  |  101  |  28.0<H>  ----------------------------<  116<H>  3.1<L>   |  33.0<H>  |  1.14    Ca    8.6      23 Jan 2021 05:03  Mg     2.0     01-22    TPro  7.5  /  Alb  3.3  /  TBili  0.6  /  DBili  x   /  AST  28  /  ALT  17  /  AlkPhos  104  01-22    Assessment:   65 year old male with history of NSVT, syncope, CAD s/p CABG and EF 20% now POD # 1 s/p MDT single chamber ICD implant for secondary prevention.      Plan:   Keep site clean and dry for 3 days.    Keflex 500mg PO TID X 5 days.   Pt instructed as to ROM of affected arm - no lifting/pushing/pulling >10 lbs & shoulder ROM limited to 90deg & below x 6 weeks.   CXR PA/Lat prior to discharge.   Access site care and activity limitations reviewed w/ pt.   [facility] to follow.   Dispo per primary team.

## 2021-01-23 NOTE — PROGRESS NOTE ADULT - ASSESSMENT
The patient is a 65 year old male with a past medical history of diabetes mellitus type 2, hypertension, cardiomyopathy status post CABG with EF of <20% with a life vest who presented to the  ER after a near syncopal episode at home. 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 Carotid duplex showed no evidence of stenosis. Seen by cardiology and neurology. Had episode of NSVT on the monitor and ++ Orthostatic Bp and pulse.       Near syncope w/ ++ Orthostatic BP and pulse  - s/p medtronic single chamber ICD implantation for secondary prevention, POD #1   - c/w Keflex 500mg PO BID day 1/5    - Lasix remains on hold  - Medication dose change per cardio recs of Coreg to 3.125 BID and Entresto 24/26 qd w/ parameters  - Ambulate with assistance   - Fall precautions  - Cardiology & EP following  - Neurology consult noted and agree unlikely TIA   - Right visual field deficit from previous occipital stroke? Will hold off on MRI for now  as no other deficit noted  - Symptoms likely cardiac origin  - Continue to monitor on telemetry       NSVT on telemetry  - Life vest interrogated    - s/p medtronic single chamber ICD implantation for secondary prevention, POD #1    - Maintain K~4 and Mg~2       HFrEF 2/2 Ischemic cardiomyopathy  - Not in acute exacerbation at this time  - c/w Aspirin brillinta and statin  - c/w Entresto and coreg with parameters      Mild Hypokalemia  - Supplemented  - Will monitor lytes closely and replete as needed  - Goal K~4 and Mg~2      Uncontrolled diabetes mellitus type 2  -  A1c of 10   - On lantus and HSS  - Improved glycemic control   - Diabetes educator consulted       DVT ppx  - Heparin subcut       Dispo:  Pt remains symptomatic.  Medication doses adjusted and will monitor for improvement.  Anticipate d/c in 1-3 days.      The patient is a 65 year old male with a past medical history of diabetes mellitus type 2, hypertension, cardiomyopathy status post CABG with EF of <20% with a life vest who presented to the  ER after a near syncopal episode at home. 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 Carotid duplex showed no evidence of stenosis. Seen by cardiology and neurology. Had episode of NSVT on the monitor and ++ Orthostatic Bp and pulse.       Near syncope w/ ++ Orthostatic BP and pulse  - s/p medtronic single chamber ICD implantation for secondary prevention, POD #1   - c/w Keflex 500mg PO BID day 1/5    - Lasix remains on hold  - Medication dose change per cardio recs of Coreg to 3.125 BID and Entresto 24/26 qd w/ parameters  - Ambulate with assistance   - Fall precautions  - Cardiology & EP following  - Neurology consult noted and agree unlikely TIA   - Right visual field deficit from previous occipital stroke? Will hold off on MRI for now  as no other deficit noted  - Symptoms likely cardiac origin  - Continue to monitor on telemetry       NSVT on telemetry  - s/p medtronic single chamber ICD implantation for secondary prevention, POD #1    - Maintain K~4 and Mg~2       HFrEF 2/2 Ischemic cardiomyopathy  - Not in acute exacerbation at this time  - c/w Aspirin brillinta and statin  - c/w Entresto and coreg with parameters      Mild Hypokalemia  - Supplemented  - Will monitor lytes closely and replete as needed  - Goal K~4 and Mg~2      Uncontrolled diabetes mellitus type 2  -  A1c of 10   - On lantus and HSS  - Improved glycemic control   - Diabetes educator consulted       DVT ppx  - Heparin subcut       Dispo:  Pt remains symptomatic.  Medication doses adjusted and will monitor for improvement.  Anticipate d/c in 1-3 days.

## 2021-01-23 NOTE — PROGRESS NOTE ADULT - SUBJECTIVE AND OBJECTIVE BOX
[place] [facility] - [facility], THE [facility]                                   [address] [place] [address]                                                      PHONE: [phone]                                                         FAX: [phone]  [email or web address]  ---------------------------------------------------------------------------------------------------------------------------------    Overnight events/patient complaints:     s/p AICD Remains Hypotensive since admission  Will monitor in tele adjusting his cardiac meds otherwise feels well in NAD,  No CP or SOB overnight, patient presently in the CATH LAB ,     PAST MEDICAL & SURGICAL HISTORY:  Abnormal nuclear stress test    Depression    Diabetes    HTN (Hypertension)  controled with meds    CAD (Coronary Artery Disease)  LAD occlusion    S/P CABG (coronary artery bypass graft)    S/P Inguinal Hernia Repair  ishaan with mesh 2008      Toprol-XL (Faint; Other)    MEDICATIONS  (STANDING):  aspirin enteric coated 81 milliGRAM(s) Oral daily  atorvastatin 40 milliGRAM(s) Oral at bedtime  carvedilol 12.5 milliGRAM(s) Oral every 12 hours  ceFAZolin   IVPB 2000 milliGRAM(s) IV Intermittent every 8 hours  cephalexin 500 milliGRAM(s) Oral every 12 hours  dextrose 40% Gel 15 Gram(s) Oral once  dextrose 5%. 1000 milliLiter(s) (50 mL/Hr) IV Continuous <Continuous>  dextrose 5%. 1000 milliLiter(s) (100 mL/Hr) IV Continuous <Continuous>  dextrose 50% Injectable 25 Gram(s) IV Push once  dextrose 50% Injectable 12.5 Gram(s) IV Push once  dextrose 50% Injectable 25 Gram(s) IV Push once  gabapentin 100 milliGRAM(s) Oral three times a day  glucagon  Injectable 1 milliGRAM(s) IntraMuscular once  heparin   Injectable 5000 Unit(s) SubCutaneous every 12 hours  insulin glargine Injectable (LANTUS) 20 Unit(s) SubCutaneous at bedtime  insulin lispro (ADMELOG) corrective regimen sliding scale   SubCutaneous three times a day before meals  insulin lispro (ADMELOG) corrective regimen sliding scale   SubCutaneous at bedtime  sacubitril 49 mG/valsartan 51 mG 1 Tablet(s) Oral two times a day  ticagrelor 90 milliGRAM(s) Oral every 12 hours    MEDICATIONS  (PRN):  acetaminophen   Tablet .. 650 milliGRAM(s) Oral every 6 hours PRN Temp greater or equal to 38C (100.4F), Mild Pain (1 - 3)      Vital Signs Last 24 Hrs  T(C): 36.3 (22 Jan 2021 15:24), Max: 36.8 (22 Jan 2021 11:58)  T(F): 97.3 (22 Jan 2021 15:24), Max: 98.3 (22 Jan 2021 11:58)  HR: 72 (23 Jan 2021 08:53) (68 - 89)  BP: 73/53 (23 Jan 2021 08:53) (73/50 - 149/85)  BP(mean): --  RR: 16 (23 Jan 2021 07:38) (9 - 20)  SpO2: 97% (23 Jan 2021 07:38) (97% - 100%)  ICU Vital Signs Last 24 Hrs  SHASTA MCKINLEY  I&O's Detail    I&O's Summary    Drug Dosing Weight  SHASTA MCKINLEY    REVIEW OF SYSTEMS:    Constitutional: No fever, weight loss or fatigue  Eyes: No eye pain, visual disturbances, or discharge  ENMT:  No difficulty hearing, tinnitus, vertigo; No sinus or throat pain  Neck: No pain or stiffness  Respiratory: No cough, wheezing, chills or hemoptysis  Cardiovascular: No chest pain, palpitations, shortness of breath, dizziness or leg swelling  Gastrointestinal: No abdominal or epigastric pain. No nausea, vomiting or hematemesis; No diarrhea or constipation. No melena or hematochezia.  Genitourinary: No dysuria, frequency, hematuria or incontinence  Rectal: No pain, hemorrhoids or incontinence  Neurological: No headaches, memory loss, loss of strength, numbness or tremors  Skin: No itching, burning, rashes or lesions   Lymph Nodes: No enlarged glands  Endocrine: No heat or cold intolerance; No hair loss  Musculoskeletal: No joint pain or swelling; No muscle, back or extremity pain  Psychiatric: No depression, anxiety, mood swings or difficulty sleeping  Heme/Lymph: No easy bruising or bleeding gums  Allergy and Immunologic: No hives or eczema    PHYSICAL EXAM:  General: Appears well developed, well nourished alert and cooperative.  HEENT: Head; normocephalic, atraumatic.  Eyes: Pupils reactive, cornea wnl.  Neck: Supple, no nodes adenopathy, no NVD or carotid bruit or thyromegaly.  CARDIOVASCULAR: Normal S1 and S2, No murmur, rub, gallop or lift.   LUNGS: No rales, rhonchi or wheeze. Normal breath sounds bilaterally.  ABDOMEN: Soft, nontender without mass or organomegaly. bowel sounds normoactive.  EXTREMITIES: No clubbing, cyanosis or edema. Distal pulses wnl.   SKIN: warm and dry with normal turgor.  NEURO: Alert/oriented x 3/normal motor exam. No pathologic reflexes.    PSYCH: normal affect.        LABS:                        12.5   7.91  )-----------( 205      ( 23 Jan 2021 05:03 )             37.1     01-23    142  |  101  |  28.0<H>  ----------------------------<  116<H>  3.1<L>   |  33.0<H>  |  1.14    Ca    8.6      23 Jan 2021 05:03  Mg     2.0     01-22    TPro  7.5  /  Alb  3.3  /  TBili  0.6  /  DBili  x   /  AST  28  /  ALT  17  /  AlkPhos  104  01-22    SHASTA MCKINLEY  CARDIAC MARKERS ( 22 Jan 2021 07:33 )  x     / 0.05 ng/mL / x     / x     / x                RADIOLOGY & ADDITIONAL STUDIES:    INTERPRETATION OF TELEMETRY (personally reviewed):    ECG:    ECHO:    STRESS TEST:    CARDIAC CATHETERIZATION:    ACTIVE PROBLEMS:  HEALTH ISSUES - PROBLEM Dx:  Diabetes mellitus  Diabetes mellitus    Coronary artery disease involving coronary bypass graft of native heart without angina pectoris  Coronary artery disease involving coronary bypass graft of native heart without angina pectoris    Ischemic cardiomyopathy  Ischemic cardiomyopathy    TIA (transient ischemic attack)  TIA (transient ischemic attack)    Near syncope  Near syncope

## 2021-01-23 NOTE — PROGRESS NOTE ADULT - SUBJECTIVE AND OBJECTIVE BOX
Chief Complaint:  near syncope/TIA    SUBJECTIVE / OVERNIGHT EVENTS: No acute events overnight however pt had near syncopal event today after standing to ambulate to bathroom.  Pt was off telem monitor therefore unsure if had arrhythmia during event.   Pt sat down and symptoms resolved.      Patient denies chest pain, SOB, abd pain, N/V, fever, chills, dysuria or any other complaints. All remainder ROS negative.       I&O's Summary        PHYSICAL EXAM:  Vital Signs Last 24 Hrs  T(C): 36.3 (23 Jan 2021 13:58), Max: 36.8 (23 Jan 2021 11:10)  T(F): 97.4 (23 Jan 2021 13:58), Max: 98.3 (23 Jan 2021 11:10)  HR: 81 (23 Jan 2021 13:58) (68 - 89)  BP: 147/70 (23 Jan 2021 13:58) (73/50 - 149/85)  BP(mean): 90 (23 Jan 2021 13:58) (83 - 90)  RR: 18 (23 Jan 2021 13:58) (9 - 20)  SpO2: 97% (23 Jan 2021 13:58) (96% - 100%)      CONSTITUTIONAL: pt examined bedside, laying comfortably in bed in NAD  HEENT: NC/AT, moist oral mucosa, clear conjunctiva, sclera nonicteric  RESPIRATORY: Normal respiratory effort; CTA b/l, no wheezing, rhonchi, rales  CARDIOVASCULAR: RRR, normal S1 and S2  ABDOMEN: soft, NT/ND, normoactive bowel sounds, no rebound/guarding  EXTREMITIES: No cynaosis, no clubbing, no lower extremity edema; Peripheral pulses are 2+ bilaterally  PSYCH: affect appropriate and cooperative  NEUROLOGY: A+O to person, place, and time, no focal neurologic deficits appreciated   SKIN: No rashes or no palpable lesions        LABS:                        12.5   7.91  )-----------( 205      ( 23 Jan 2021 05:03 )             37.1     01-23    142  |  101  |  28.0<H>  ----------------------------<  116<H>  3.1<L>   |  33.0<H>  |  1.14    Ca    8.6      23 Jan 2021 05:03  Mg     2.0     01-22    TPro  7.5  /  Alb  3.3  /  TBili  0.6  /  DBili  x   /  AST  28  /  ALT  17  /  AlkPhos  104  01-22      CARDIAC MARKERS ( 22 Jan 2021 07:33 )  x     / 0.05 ng/mL / x     / x     / x              CAPILLARY BLOOD GLUCOSE      POCT Blood Glucose.: 103 mg/dL (23 Jan 2021 12:23)  POCT Blood Glucose.: 88 mg/dL (23 Jan 2021 08:52)  POCT Blood Glucose.: 68 mg/dL (23 Jan 2021 07:31)  POCT Blood Glucose.: 146 mg/dL (22 Jan 2021 20:54)  POCT Blood Glucose.: 118 mg/dL (22 Jan 2021 17:07)        RADIOLOGY & ADDITIONAL TESTS:          MEDICATIONS  (STANDING):  aspirin enteric coated 81 milliGRAM(s) Oral daily  atorvastatin 40 milliGRAM(s) Oral at bedtime  carvedilol 12.5 milliGRAM(s) Oral every 12 hours  cephalexin 500 milliGRAM(s) Oral every 12 hours  dextrose 40% Gel 15 Gram(s) Oral once  dextrose 5%. 1000 milliLiter(s) (50 mL/Hr) IV Continuous <Continuous>  dextrose 5%. 1000 milliLiter(s) (100 mL/Hr) IV Continuous <Continuous>  dextrose 50% Injectable 25 Gram(s) IV Push once  dextrose 50% Injectable 12.5 Gram(s) IV Push once  dextrose 50% Injectable 25 Gram(s) IV Push once  gabapentin 100 milliGRAM(s) Oral three times a day  glucagon  Injectable 1 milliGRAM(s) IntraMuscular once  heparin   Injectable 5000 Unit(s) SubCutaneous every 12 hours  insulin glargine Injectable (LANTUS) 20 Unit(s) SubCutaneous at bedtime  insulin lispro (ADMELOG) corrective regimen sliding scale   SubCutaneous three times a day before meals  insulin lispro (ADMELOG) corrective regimen sliding scale   SubCutaneous at bedtime  sacubitril 49 mG/valsartan 51 mG 1 Tablet(s) Oral two times a day  ticagrelor 90 milliGRAM(s) Oral every 12 hours    MEDICATIONS  (PRN):  acetaminophen   Tablet .. 650 milliGRAM(s) Oral every 6 hours PRN Temp greater or equal to 38C (100.4F), Mild Pain (1 - 3)

## 2021-01-23 NOTE — PROGRESS NOTE ADULT - ASSESSMENT
Telemetry  will change Entresto to 24/26 and Coreg to 3.125 mg po BID patient had been very symptomatic after recent titration in his meds  Will follow

## 2021-01-24 LAB
ANION GAP SERPL CALC-SCNC: 8 MMOL/L — SIGNIFICANT CHANGE UP (ref 5–17)
BUN SERPL-MCNC: 23 MG/DL — HIGH (ref 8–20)
CALCIUM SERPL-MCNC: 8.5 MG/DL — LOW (ref 8.6–10.2)
CHLORIDE SERPL-SCNC: 105 MMOL/L — SIGNIFICANT CHANGE UP (ref 98–107)
CO2 SERPL-SCNC: 31 MMOL/L — HIGH (ref 22–29)
CREAT SERPL-MCNC: 1.16 MG/DL — SIGNIFICANT CHANGE UP (ref 0.5–1.3)
GLUCOSE BLDC GLUCOMTR-MCNC: 146 MG/DL — HIGH (ref 70–99)
GLUCOSE BLDC GLUCOMTR-MCNC: 199 MG/DL — HIGH (ref 70–99)
GLUCOSE BLDC GLUCOMTR-MCNC: 209 MG/DL — HIGH (ref 70–99)
GLUCOSE BLDC GLUCOMTR-MCNC: 66 MG/DL — LOW (ref 70–99)
GLUCOSE BLDC GLUCOMTR-MCNC: 70 MG/DL — SIGNIFICANT CHANGE UP (ref 70–99)
GLUCOSE BLDC GLUCOMTR-MCNC: 97 MG/DL — SIGNIFICANT CHANGE UP (ref 70–99)
GLUCOSE SERPL-MCNC: 44 MG/DL — CRITICAL LOW (ref 70–99)
HCT VFR BLD CALC: 38.5 % — LOW (ref 39–50)
HGB BLD-MCNC: 12.5 G/DL — LOW (ref 13–17)
MAGNESIUM SERPL-MCNC: 2.2 MG/DL — SIGNIFICANT CHANGE UP (ref 1.6–2.6)
MCHC RBC-ENTMCNC: 27.7 PG — SIGNIFICANT CHANGE UP (ref 27–34)
MCHC RBC-ENTMCNC: 32.5 GM/DL — SIGNIFICANT CHANGE UP (ref 32–36)
MCV RBC AUTO: 85.4 FL — SIGNIFICANT CHANGE UP (ref 80–100)
PHOSPHATE SERPL-MCNC: 3.1 MG/DL — SIGNIFICANT CHANGE UP (ref 2.4–4.7)
PLATELET # BLD AUTO: 186 K/UL — SIGNIFICANT CHANGE UP (ref 150–400)
POTASSIUM SERPL-MCNC: 4.1 MMOL/L — SIGNIFICANT CHANGE UP (ref 3.5–5.3)
POTASSIUM SERPL-SCNC: 4.1 MMOL/L — SIGNIFICANT CHANGE UP (ref 3.5–5.3)
RBC # BLD: 4.51 M/UL — SIGNIFICANT CHANGE UP (ref 4.2–5.8)
RBC # FLD: 14.3 % — SIGNIFICANT CHANGE UP (ref 10.3–14.5)
SODIUM SERPL-SCNC: 144 MMOL/L — SIGNIFICANT CHANGE UP (ref 135–145)
WBC # BLD: 7.14 K/UL — SIGNIFICANT CHANGE UP (ref 3.8–10.5)
WBC # FLD AUTO: 7.14 K/UL — SIGNIFICANT CHANGE UP (ref 3.8–10.5)

## 2021-01-24 PROCEDURE — 99232 SBSQ HOSP IP/OBS MODERATE 35: CPT

## 2021-01-24 RX ORDER — SODIUM CHLORIDE 9 MG/ML
1000 INJECTION, SOLUTION INTRAVENOUS
Refills: 0 | Status: DISCONTINUED | OUTPATIENT
Start: 2021-01-24 | End: 2021-01-25

## 2021-01-24 RX ADMIN — Medication 2: at 11:58

## 2021-01-24 RX ADMIN — TICAGRELOR 90 MILLIGRAM(S): 90 TABLET ORAL at 16:50

## 2021-01-24 RX ADMIN — CARVEDILOL PHOSPHATE 3.12 MILLIGRAM(S): 80 CAPSULE, EXTENDED RELEASE ORAL at 04:52

## 2021-01-24 RX ADMIN — Medication 500 MILLIGRAM(S): at 16:49

## 2021-01-24 RX ADMIN — Medication 650 MILLIGRAM(S): at 09:25

## 2021-01-24 RX ADMIN — HEPARIN SODIUM 5000 UNIT(S): 5000 INJECTION INTRAVENOUS; SUBCUTANEOUS at 04:52

## 2021-01-24 RX ADMIN — Medication 500 MILLIGRAM(S): at 04:52

## 2021-01-24 RX ADMIN — GABAPENTIN 100 MILLIGRAM(S): 400 CAPSULE ORAL at 04:52

## 2021-01-24 RX ADMIN — TICAGRELOR 90 MILLIGRAM(S): 90 TABLET ORAL at 04:52

## 2021-01-24 RX ADMIN — HEPARIN SODIUM 5000 UNIT(S): 5000 INJECTION INTRAVENOUS; SUBCUTANEOUS at 16:50

## 2021-01-24 RX ADMIN — GABAPENTIN 100 MILLIGRAM(S): 400 CAPSULE ORAL at 12:01

## 2021-01-24 RX ADMIN — ATORVASTATIN CALCIUM 40 MILLIGRAM(S): 80 TABLET, FILM COATED ORAL at 20:18

## 2021-01-24 RX ADMIN — GABAPENTIN 100 MILLIGRAM(S): 400 CAPSULE ORAL at 20:18

## 2021-01-24 RX ADMIN — Medication 81 MILLIGRAM(S): at 11:58

## 2021-01-24 RX ADMIN — SACUBITRIL AND VALSARTAN 1 TABLET(S): 24; 26 TABLET, FILM COATED ORAL at 16:49

## 2021-01-24 RX ADMIN — SODIUM CHLORIDE 50 MILLILITER(S): 9 INJECTION, SOLUTION INTRAVENOUS at 08:36

## 2021-01-24 RX ADMIN — SACUBITRIL AND VALSARTAN 1 TABLET(S): 24; 26 TABLET, FILM COATED ORAL at 04:52

## 2021-01-24 RX ADMIN — CARVEDILOL PHOSPHATE 3.12 MILLIGRAM(S): 80 CAPSULE, EXTENDED RELEASE ORAL at 16:49

## 2021-01-24 RX ADMIN — INSULIN GLARGINE 20 UNIT(S): 100 INJECTION, SOLUTION SUBCUTANEOUS at 20:23

## 2021-01-24 NOTE — PROGRESS NOTE ADULT - ASSESSMENT
S/P AICD severe ischemic CMP S/P CABG, S/P PCI    Tele  Pt improved  continue present cardiac therapy BP stable after adjustments  OOB to chair  PT  Likely DC in AM

## 2021-01-24 NOTE — PROGRESS NOTE ADULT - ASSESSMENT
The patient is a 65 year old male with a past medical history of diabetes mellitus type 2, hypertension, cardiomyopathy status post CABG with EF of <20% with a life vest who presented to the  ER after a near syncopal episode at home. 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 Carotid duplex showed no evidence of stenosis. Seen by cardiology and neurology. Had episode of NSVT on the monitor and ++ Orthostatic Bp and pulse.       Near syncope w/ ++ Orthostatic BP and pulse  - s/p medtronic single chamber ICD implantation for secondary prevention, POD #1   - c/w Keflex 500mg PO BID day 1/5    - Lasix remains on hold  - Medication dose change per cardio recs of Coreg to 3.125 BID and Entresto 24/26 qd w/ parameters  - Ambulate with assistance   - Fall precautions  - Cardiology & EP following  - Neurology consult noted and agree unlikely TIA   - Right visual field deficit from previous occipital stroke? Will hold off on MRI for now  as no other deficit noted  - Symptoms likely cardiac origin  - Continue to monitor on telemetry       NSVT on telemetry  - s/p medtronic single chamber ICD implantation for secondary prevention, POD #1    - Maintain K~4 and Mg~2       HFrEF 2/2 Ischemic cardiomyopathy  - Not in acute exacerbation at this time  - c/w Aspirin brillinta and statin  - c/w Entresto and coreg with parameters      Mild Hypokalemia  - Supplemented  - Will monitor lytes closely and replete as needed  - Goal K~4 and Mg~2      Uncontrolled diabetes mellitus type 2  -  A1c of 10   - On lantus and HSS  - Improved glycemic control   - Diabetes educator consulted       DVT ppx  - Heparin subcut       Dispo:  Pt remains symptomatic.  Medication doses adjusted and will monitor for improvement.  Anticipate d/c in 1-3 days.      The patient is a 65 year old male with a past medical history of diabetes mellitus type 2, hypertension, cardiomyopathy status post CABG with EF of <20% with a life vest who presented to the  ER after a near syncopal episode at home. 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 Carotid duplex showed no evidence of stenosis. Seen by cardiology and neurology. Had episode of NSVT on the monitor and ++ Orthostatic Bp and pulse.       Near syncope w/ ++ Orthostatic BP and pulse  - Symptoms have since resolved  - s/p medtronic single chamber ICD implantation for secondary prevention  - c/w Keflex 500mg PO BID day 2/5    - Lasix remains on hold  - c/w Coreg to 3.125 BID and Entresto 24/26 qd w/ parameters  - Ambulate with assistance and fall precautions  - Cardiology & EP following  - Neurology consult noted and agree unlikely TIA   - Right visual field deficit from previous occipital stroke? Will hold off on MRI for now  as no other deficit noted  - Continue to monitor on telemetry       NSVT on telemetry  - s/p medtronic single chamber ICD implantation for secondary prevention    - Maintain K~4 and Mg~2       HFrEF 2/2 Ischemic cardiomyopathy  - Not in acute exacerbation at this time  - c/w Aspirin brillinta and statin  - c/w Entresto and coreg with parameters      Mild Hypokalemia, resolved  - Will monitor lytes closely and replete as needed  - Goal K~4 and Mg~2      Uncontrolled diabetes mellitus type 2  -  A1c of 10   - On lantus and HSS  - Improved glycemic control   - Diabetes educator consulted       DVT ppx  - Heparin subcut       Dispo:  Pt symptoms resolved s/p medication dose adjustment  Anticipate d/c home tomorrow if no acute events overnight on telemetry and if remains asymptomatic and if cleared by cardiology.

## 2021-01-24 NOTE — PROGRESS NOTE ADULT - SUBJECTIVE AND OBJECTIVE BOX
[place] [facility], THE HEART CENTER                                   [address] [place] [address]                                                      PHONE: [phone]                                                         FAX: [phone]  [email or web address]  ---------------------------------------------------------------------------------------------------------------------------------    Overnight events/patient complaints:     s/p AICD BP IMPROVED AFTER TAPER OFF HIS MEDS    No CP or SOB overnight, patient presently in 4T     PAST MEDICAL & SURGICAL HISTORY:  Abnormal nuclear stress test    Depression    Diabetes    HTN (Hypertension)  controled with meds    CAD (Coronary Artery Disease)  LAD occlusion    S/P CABG (coronary artery bypass graft)    S/P Inguinal Hernia Repair  ishaan with mesh 2008      Toprol-XL (Faint; Other)    MEDICATIONS  (STANDING):  aspirin enteric coated 81 milliGRAM(s) Oral daily  atorvastatin 40 milliGRAM(s) Oral at bedtime  carvedilol 12.5 milliGRAM(s) Oral every 12 hours  ceFAZolin   IVPB 2000 milliGRAM(s) IV Intermittent every 8 hours  cephalexin 500 milliGRAM(s) Oral every 12 hours  dextrose 40% Gel 15 Gram(s) Oral once  dextrose 5%. 1000 milliLiter(s) (50 mL/Hr) IV Continuous <Continuous>  dextrose 5%. 1000 milliLiter(s) (100 mL/Hr) IV Continuous <Continuous>  dextrose 50% Injectable 25 Gram(s) IV Push once  dextrose 50% Injectable 12.5 Gram(s) IV Push once  dextrose 50% Injectable 25 Gram(s) IV Push once  gabapentin 100 milliGRAM(s) Oral three times a day  glucagon  Injectable 1 milliGRAM(s) IntraMuscular once  heparin   Injectable 5000 Unit(s) SubCutaneous every 12 hours  insulin glargine Injectable (LANTUS) 20 Unit(s) SubCutaneous at bedtime  insulin lispro (ADMELOG) corrective regimen sliding scale   SubCutaneous three times a day before meals  insulin lispro (ADMELOG) corrective regimen sliding scale   SubCutaneous at bedtime  sacubitril 49 mG/valsartan 51 mG 1 Tablet(s) Oral two times a day  ticagrelor 90 milliGRAM(s) Oral every 12 hours    MEDICATIONS  (PRN):  acetaminophen   Tablet .. 650 milliGRAM(s) Oral every 6 hours PRN Temp greater or equal to 38C (100.4F), Mild Pain (1 - 3)      Vital Signs Last 24 Hrs  T(C): 36.3 (22 Jan 2021 15:24), Max: 36.8 (22 Jan 2021 11:58)  T(F): 97.3 (22 Jan 2021 15:24), Max: 98.3 (22 Jan 2021 11:58)  HR: 72 (23 Jan 2021 08:53) (68 - 89)  BP: 73/53 (23 Jan 2021 08:53) (73/50 - 149/85)  BP(mean): --  RR: 16 (23 Jan 2021 07:38) (9 - 20)  SpO2: 97% (23 Jan 2021 07:38) (97% - 100%)  ICU Vital Signs Last 24 Hrs  SHASTA MCKINLEY  I&O's Detail    I&O's Summary    Drug Dosing Weight  SHASTA MCKINLEY    REVIEW OF SYSTEMS:    Constitutional: No fever, weight loss or fatigue  Eyes: No eye pain, visual disturbances, or discharge  ENMT:  No difficulty hearing, tinnitus, vertigo; No sinus or throat pain  Neck: No pain or stiffness  Respiratory: No cough, wheezing, chills or hemoptysis  Cardiovascular: No chest pain, palpitations, shortness of breath, dizziness or leg swelling  Gastrointestinal: No abdominal or epigastric pain. No nausea, vomiting or hematemesis; No diarrhea or constipation. No melena or hematochezia.  Genitourinary: No dysuria, frequency, hematuria or incontinence  Rectal: No pain, hemorrhoids or incontinence  Neurological: No headaches, memory loss, loss of strength, numbness or tremors  Skin: No itching, burning, rashes or lesions   Lymph Nodes: No enlarged glands  Endocrine: No heat or cold intolerance; No hair loss  Musculoskeletal: No joint pain or swelling; No muscle, back or extremity pain  Psychiatric: No depression, anxiety, mood swings or difficulty sleeping  Heme/Lymph: No easy bruising or bleeding gums  Allergy and Immunologic: No hives or eczema    PHYSICAL EXAM:  General: Appears well developed, well nourished alert and cooperative.  HEENT: Head; normocephalic, atraumatic.  Eyes: Pupils reactive, cornea wnl.  Neck: Supple, no nodes adenopathy, no NVD or carotid bruit or thyromegaly.  CARDIOVASCULAR: Normal S1 and S2, No murmur, rub, gallop or lift.   LUNGS: No rales, rhonchi or wheeze. Normal breath sounds bilaterally.  ABDOMEN: Soft, nontender without mass or organomegaly. bowel sounds normoactive.  EXTREMITIES: No clubbing, cyanosis or edema. Distal pulses wnl.   SKIN: warm and dry with normal turgor.  NEURO: Alert/oriented x 3/normal motor exam. No pathologic reflexes.    PSYCH: normal affect.        LABS:                        12.5   7.91  )-----------( 205      ( 23 Jan 2021 05:03 )             37.1     01-23    142  |  101  |  28.0<H>  ----------------------------<  116<H>  3.1<L>   |  33.0<H>  |  1.14    Ca    8.6      23 Jan 2021 05:03  Mg     2.0     01-22    TPro  7.5  /  Alb  3.3  /  TBili  0.6  /  DBili  x   /  AST  28  /  ALT  17  /  AlkPhos  104  01-22    SHASTA MCKINLEY  CARDIAC MARKERS ( 22 Jan 2021 07:33 )  x     / 0.05 ng/mL / x     / x     / x                RADIOLOGY & ADDITIONAL STUDIES:    INTERPRETATION OF TELEMETRY (personally reviewed):      ACTIVE PROBLEMS:  HEALTH ISSUES - PROBLEM Dx:  Diabetes mellitus  Diabetes mellitus    Coronary artery disease involving coronary bypass graft of native heart without angina pectoris  Coronary artery disease involving coronary bypass graft of native heart without angina pectoris    Ischemic cardiomyopathy  Ischemic cardiomyopathy    TIA (transient ischemic attack)  TIA (transient ischemic attack)    Near syncope  Near syncope

## 2021-01-24 NOTE — PROGRESS NOTE ADULT - SUBJECTIVE AND OBJECTIVE BOX
Chief Complaint:  near syncope/TIA    SUBJECTIVE / OVERNIGHT EVENTS: No acute events overnight however pt had near syncopal event today after standing to ambulate to bathroom.  Pt was off telem monitor therefore unsure if had arrhythmia during event.   Pt sat down and symptoms resolved.      Patient denies chest pain, SOB, abd pain, N/V, fever, chills, dysuria or any other complaints. All remainder ROS negative.       I&O's Summary        PHYSICAL EXAM:  Vital Signs Last 24 Hrs  T(C): 36.3 (23 Jan 2021 13:58), Max: 36.8 (23 Jan 2021 11:10)  T(F): 97.4 (23 Jan 2021 13:58), Max: 98.3 (23 Jan 2021 11:10)  HR: 81 (23 Jan 2021 13:58) (68 - 89)  BP: 147/70 (23 Jan 2021 13:58) (73/50 - 149/85)  BP(mean): 90 (23 Jan 2021 13:58) (83 - 90)  RR: 18 (23 Jan 2021 13:58) (9 - 20)  SpO2: 97% (23 Jan 2021 13:58) (96% - 100%)      CONSTITUTIONAL: pt examined bedside, laying comfortably in bed in NAD  HEENT: NC/AT, moist oral mucosa, clear conjunctiva, sclera nonicteric  RESPIRATORY: Normal respiratory effort; CTA b/l, no wheezing, rhonchi, rales  CARDIOVASCULAR: RRR, normal S1 and S2  ABDOMEN: soft, NT/ND, normoactive bowel sounds, no rebound/guarding  EXTREMITIES: No cynaosis, no clubbing, no lower extremity edema; Peripheral pulses are 2+ bilaterally  PSYCH: affect appropriate and cooperative  NEUROLOGY: A+O to person, place, and time, no focal neurologic deficits appreciated   SKIN: No rashes or no palpable lesions        LABS:                        12.5   7.91  )-----------( 205      ( 23 Jan 2021 05:03 )             37.1     01-23    142  |  101  |  28.0<H>  ----------------------------<  116<H>  3.1<L>   |  33.0<H>  |  1.14    Ca    8.6      23 Jan 2021 05:03  Mg     2.0     01-22    TPro  7.5  /  Alb  3.3  /  TBili  0.6  /  DBili  x   /  AST  28  /  ALT  17  /  AlkPhos  104  01-22      CARDIAC MARKERS ( 22 Jan 2021 07:33 )  x     / 0.05 ng/mL / x     / x     / x              CAPILLARY BLOOD GLUCOSE      POCT Blood Glucose.: 103 mg/dL (23 Jan 2021 12:23)  POCT Blood Glucose.: 88 mg/dL (23 Jan 2021 08:52)  POCT Blood Glucose.: 68 mg/dL (23 Jan 2021 07:31)  POCT Blood Glucose.: 146 mg/dL (22 Jan 2021 20:54)  POCT Blood Glucose.: 118 mg/dL (22 Jan 2021 17:07)        RADIOLOGY & ADDITIONAL TESTS:          MEDICATIONS  (STANDING):  aspirin enteric coated 81 milliGRAM(s) Oral daily  atorvastatin 40 milliGRAM(s) Oral at bedtime  carvedilol 12.5 milliGRAM(s) Oral every 12 hours  cephalexin 500 milliGRAM(s) Oral every 12 hours  dextrose 40% Gel 15 Gram(s) Oral once  dextrose 5%. 1000 milliLiter(s) (50 mL/Hr) IV Continuous <Continuous>  dextrose 5%. 1000 milliLiter(s) (100 mL/Hr) IV Continuous <Continuous>  dextrose 50% Injectable 25 Gram(s) IV Push once  dextrose 50% Injectable 12.5 Gram(s) IV Push once  dextrose 50% Injectable 25 Gram(s) IV Push once  gabapentin 100 milliGRAM(s) Oral three times a day  glucagon  Injectable 1 milliGRAM(s) IntraMuscular once  heparin   Injectable 5000 Unit(s) SubCutaneous every 12 hours  insulin glargine Injectable (LANTUS) 20 Unit(s) SubCutaneous at bedtime  insulin lispro (ADMELOG) corrective regimen sliding scale   SubCutaneous three times a day before meals  insulin lispro (ADMELOG) corrective regimen sliding scale   SubCutaneous at bedtime  sacubitril 49 mG/valsartan 51 mG 1 Tablet(s) Oral two times a day  ticagrelor 90 milliGRAM(s) Oral every 12 hours    MEDICATIONS  (PRN):  acetaminophen   Tablet .. 650 milliGRAM(s) Oral every 6 hours PRN Temp greater or equal to 38C (100.4F), Mild Pain (1 - 3)                                     Chief Complaint:  near syncope/TIA    SUBJECTIVE / OVERNIGHT EVENTS: No acute events overnight.  Pts sympoms have resolved since adjustment of medication doses.  Pt offers no acute complaints at this time.      Patient denies chest pain, SOB, abd pain, N/V, fever, chills, dysuria or any other complaints. All remainder ROS negative.       I&O's Summary        PHYSICAL EXAM:  Vital Signs Last 24 Hrs  T(C): 36.9 (24 Jan 2021 12:00), Max: 36.9 (23 Jan 2021 15:17)  T(F): 98.4 (24 Jan 2021 12:00), Max: 98.5 (24 Jan 2021 04:00)  HR: 80 (24 Jan 2021 12:00) (72 - 83)  BP: 151/75 (24 Jan 2021 12:00) (127/74 - 151/75)  BP(mean): 100 (24 Jan 2021 08:00) (90 - 100)  RR: 18 (24 Jan 2021 12:00) (16 - 18)  SpO2: 100% (24 Jan 2021 12:00) (96% - 100%)      GENERAL: pt examined bedside, laying comfortably in bed in NAD  HEENT: NC/AT, moist oral mucosa, clear conjunctiva, sclera nonicteric  RESPIRATORY: Normal respiratory effort; CTA b/l, no wheezing, rhonchi, rales  CARDIOVASCULAR: RRR, normal S1 and S2  ABDOMEN: soft, NT/ND, normoactive bowel sounds, no rebound/guarding  EXTREMITIES: No cynaosis, no clubbing, no lower extremity edema; Peripheral pulses are 2+ bilaterally  PSYCH: affect appropriate and cooperative  NEUROLOGY: A+O to person, place, and time, no focal neurologic deficits appreciated   SKIN: No rashes or no palpable lesions        LABS:                                         12.5   7.14  )-----------( 186      ( 24 Jan 2021 07:15 )             38.5       01-24    144  |  105  |  23.0<H>  ----------------------------<  44<LL>  4.1   |  31.0<H>  |  1.16    Ca    8.5<L>      24 Jan 2021 07:15  Phos  3.1     01-24  Mg     2.2     01-24        CARDIAC MARKERS ( 22 Jan 2021 07:33 )  x     / 0.05 ng/mL / x     / x     / x              CAPILLARY BLOOD GLUCOSE      POCT Blood Glucose.: 103 mg/dL (23 Jan 2021 12:23)  POCT Blood Glucose.: 88 mg/dL (23 Jan 2021 08:52)  POCT Blood Glucose.: 68 mg/dL (23 Jan 2021 07:31)  POCT Blood Glucose.: 146 mg/dL (22 Jan 2021 20:54)  POCT Blood Glucose.: 118 mg/dL (22 Jan 2021 17:07)        RADIOLOGY & ADDITIONAL TESTS:          MEDICATIONS  (STANDING):  aspirin enteric coated 81 milliGRAM(s) Oral daily  atorvastatin 40 milliGRAM(s) Oral at bedtime  carvedilol 12.5 milliGRAM(s) Oral every 12 hours  cephalexin 500 milliGRAM(s) Oral every 12 hours  dextrose 40% Gel 15 Gram(s) Oral once  dextrose 5%. 1000 milliLiter(s) (50 mL/Hr) IV Continuous <Continuous>  dextrose 5%. 1000 milliLiter(s) (100 mL/Hr) IV Continuous <Continuous>  dextrose 50% Injectable 25 Gram(s) IV Push once  dextrose 50% Injectable 12.5 Gram(s) IV Push once  dextrose 50% Injectable 25 Gram(s) IV Push once  gabapentin 100 milliGRAM(s) Oral three times a day  glucagon  Injectable 1 milliGRAM(s) IntraMuscular once  heparin   Injectable 5000 Unit(s) SubCutaneous every 12 hours  insulin glargine Injectable (LANTUS) 20 Unit(s) SubCutaneous at bedtime  insulin lispro (ADMELOG) corrective regimen sliding scale   SubCutaneous three times a day before meals  insulin lispro (ADMELOG) corrective regimen sliding scale   SubCutaneous at bedtime  sacubitril 49 mG/valsartan 51 mG 1 Tablet(s) Oral two times a day  ticagrelor 90 milliGRAM(s) Oral every 12 hours    MEDICATIONS  (PRN):  acetaminophen   Tablet .. 650 milliGRAM(s) Oral every 6 hours PRN Temp greater or equal to 38C (100.4F), Mild Pain (1 - 3)

## 2021-01-25 ENCOUNTER — TRANSCRIPTION ENCOUNTER (OUTPATIENT)
Age: 66
End: 2021-01-25

## 2021-01-25 VITALS — DIASTOLIC BLOOD PRESSURE: 70 MMHG | SYSTOLIC BLOOD PRESSURE: 113 MMHG | HEART RATE: 83 BPM

## 2021-01-25 LAB
ANION GAP SERPL CALC-SCNC: 8 MMOL/L — SIGNIFICANT CHANGE UP (ref 5–17)
BUN SERPL-MCNC: 20 MG/DL — SIGNIFICANT CHANGE UP (ref 8–20)
CALCIUM SERPL-MCNC: 8.4 MG/DL — LOW (ref 8.6–10.2)
CHLORIDE SERPL-SCNC: 105 MMOL/L — SIGNIFICANT CHANGE UP (ref 98–107)
CO2 SERPL-SCNC: 27 MMOL/L — SIGNIFICANT CHANGE UP (ref 22–29)
CREAT SERPL-MCNC: 0.78 MG/DL — SIGNIFICANT CHANGE UP (ref 0.5–1.3)
GLUCOSE BLDC GLUCOMTR-MCNC: 78 MG/DL — SIGNIFICANT CHANGE UP (ref 70–99)
GLUCOSE BLDC GLUCOMTR-MCNC: 85 MG/DL — SIGNIFICANT CHANGE UP (ref 70–99)
GLUCOSE SERPL-MCNC: 60 MG/DL — LOW (ref 70–99)
HCT VFR BLD CALC: 37.9 % — LOW (ref 39–50)
HGB BLD-MCNC: 12.3 G/DL — LOW (ref 13–17)
MCHC RBC-ENTMCNC: 27.9 PG — SIGNIFICANT CHANGE UP (ref 27–34)
MCHC RBC-ENTMCNC: 32.5 GM/DL — SIGNIFICANT CHANGE UP (ref 32–36)
MCV RBC AUTO: 85.9 FL — SIGNIFICANT CHANGE UP (ref 80–100)
PLATELET # BLD AUTO: 170 K/UL — SIGNIFICANT CHANGE UP (ref 150–400)
POTASSIUM SERPL-MCNC: 3.9 MMOL/L — SIGNIFICANT CHANGE UP (ref 3.5–5.3)
POTASSIUM SERPL-SCNC: 3.9 MMOL/L — SIGNIFICANT CHANGE UP (ref 3.5–5.3)
RBC # BLD: 4.41 M/UL — SIGNIFICANT CHANGE UP (ref 4.2–5.8)
RBC # FLD: 14.5 % — SIGNIFICANT CHANGE UP (ref 10.3–14.5)
SODIUM SERPL-SCNC: 140 MMOL/L — SIGNIFICANT CHANGE UP (ref 135–145)
WBC # BLD: 7.13 K/UL — SIGNIFICANT CHANGE UP (ref 3.8–10.5)
WBC # FLD AUTO: 7.13 K/UL — SIGNIFICANT CHANGE UP (ref 3.8–10.5)

## 2021-01-25 PROCEDURE — 97163 PT EVAL HIGH COMPLEX 45 MIN: CPT

## 2021-01-25 PROCEDURE — 71045 X-RAY EXAM CHEST 1 VIEW: CPT

## 2021-01-25 PROCEDURE — 85610 PROTHROMBIN TIME: CPT

## 2021-01-25 PROCEDURE — 99239 HOSP IP/OBS DSCHRG MGMT >30: CPT

## 2021-01-25 PROCEDURE — 85730 THROMBOPLASTIN TIME PARTIAL: CPT

## 2021-01-25 PROCEDURE — 80053 COMPREHEN METABOLIC PANEL: CPT

## 2021-01-25 PROCEDURE — 0225U NFCT DS DNA&RNA 21 SARSCOV2: CPT

## 2021-01-25 PROCEDURE — 92523 SPEECH SOUND LANG COMPREHEN: CPT

## 2021-01-25 PROCEDURE — 83735 ASSAY OF MAGNESIUM: CPT

## 2021-01-25 PROCEDURE — 84443 ASSAY THYROID STIM HORMONE: CPT

## 2021-01-25 PROCEDURE — 86769 SARS-COV-2 COVID-19 ANTIBODY: CPT

## 2021-01-25 PROCEDURE — 80061 LIPID PANEL: CPT

## 2021-01-25 PROCEDURE — 36415 COLL VENOUS BLD VENIPUNCTURE: CPT

## 2021-01-25 PROCEDURE — 70450 CT HEAD/BRAIN W/O DYE: CPT

## 2021-01-25 PROCEDURE — 83036 HEMOGLOBIN GLYCOSYLATED A1C: CPT

## 2021-01-25 PROCEDURE — 82962 GLUCOSE BLOOD TEST: CPT

## 2021-01-25 PROCEDURE — C1777: CPT

## 2021-01-25 PROCEDURE — 93005 ELECTROCARDIOGRAM TRACING: CPT

## 2021-01-25 PROCEDURE — 85027 COMPLETE CBC AUTOMATED: CPT

## 2021-01-25 PROCEDURE — 85025 COMPLETE CBC W/AUTO DIFF WBC: CPT

## 2021-01-25 PROCEDURE — U0005: CPT

## 2021-01-25 PROCEDURE — 80048 BASIC METABOLIC PNL TOTAL CA: CPT

## 2021-01-25 PROCEDURE — 93010 ELECTROCARDIOGRAM REPORT: CPT

## 2021-01-25 PROCEDURE — 84484 ASSAY OF TROPONIN QUANT: CPT

## 2021-01-25 PROCEDURE — 99233 SBSQ HOSP IP/OBS HIGH 50: CPT

## 2021-01-25 PROCEDURE — U0003: CPT

## 2021-01-25 PROCEDURE — 84100 ASSAY OF PHOSPHORUS: CPT

## 2021-01-25 PROCEDURE — 33249 INSJ/RPLCMT DEFIB W/LEAD(S): CPT

## 2021-01-25 PROCEDURE — C1722: CPT

## 2021-01-25 PROCEDURE — 82607 VITAMIN B-12: CPT

## 2021-01-25 PROCEDURE — 99285 EMERGENCY DEPT VISIT HI MDM: CPT

## 2021-01-25 PROCEDURE — 93880 EXTRACRANIAL BILAT STUDY: CPT

## 2021-01-25 PROCEDURE — 82306 VITAMIN D 25 HYDROXY: CPT

## 2021-01-25 PROCEDURE — C1892: CPT

## 2021-01-25 RX ORDER — SACUBITRIL AND VALSARTAN 24; 26 MG/1; MG/1
1 TABLET, FILM COATED ORAL
Qty: 60 | Refills: 0
Start: 2021-01-25 | End: 2021-02-23

## 2021-01-25 RX ORDER — CARVEDILOL PHOSPHATE 80 MG/1
1 CAPSULE, EXTENDED RELEASE ORAL
Qty: 60 | Refills: 0
Start: 2021-01-25 | End: 2021-02-23

## 2021-01-25 RX ORDER — SACUBITRIL AND VALSARTAN 24; 26 MG/1; MG/1
1 TABLET, FILM COATED ORAL
Qty: 0 | Refills: 0 | DISCHARGE

## 2021-01-25 RX ORDER — NITROGLYCERIN 6.5 MG
1 CAPSULE, EXTENDED RELEASE ORAL
Qty: 0 | Refills: 0 | DISCHARGE

## 2021-01-25 RX ORDER — CEPHALEXIN 500 MG
1 CAPSULE ORAL
Qty: 6 | Refills: 0
Start: 2021-01-25 | End: 2021-01-27

## 2021-01-25 RX ADMIN — HEPARIN SODIUM 5000 UNIT(S): 5000 INJECTION INTRAVENOUS; SUBCUTANEOUS at 04:30

## 2021-01-25 RX ADMIN — SACUBITRIL AND VALSARTAN 1 TABLET(S): 24; 26 TABLET, FILM COATED ORAL at 05:44

## 2021-01-25 RX ADMIN — Medication 650 MILLIGRAM(S): at 10:49

## 2021-01-25 RX ADMIN — CARVEDILOL PHOSPHATE 3.12 MILLIGRAM(S): 80 CAPSULE, EXTENDED RELEASE ORAL at 04:30

## 2021-01-25 RX ADMIN — Medication 500 MILLIGRAM(S): at 04:30

## 2021-01-25 RX ADMIN — Medication 650 MILLIGRAM(S): at 02:04

## 2021-01-25 RX ADMIN — GABAPENTIN 100 MILLIGRAM(S): 400 CAPSULE ORAL at 04:30

## 2021-01-25 RX ADMIN — TICAGRELOR 90 MILLIGRAM(S): 90 TABLET ORAL at 04:30

## 2021-01-25 NOTE — PROGRESS NOTE ADULT - PROVIDER SPECIALTY LIST ADULT
Hospitalist
Rehab Medicine
Cardiology
Cardiology
Electrophysiology
Internal Medicine
Rehab Medicine
Cardiology
Hospitalist
Hospitalist
Neurology
Cardiology

## 2021-01-25 NOTE — PROGRESS NOTE ADULT - ASSESSMENT
Assessment  Syncope  s/p ICD  ischemic CM s/p PCI  HFrEF well compensated Ef 20% with mod MR  DM    Rec  Cont entresto/coreg/asa/brilinta/statin  may dc home, pt has FU in am in my office

## 2021-01-25 NOTE — PROGRESS NOTE ADULT - REASON FOR ADMISSION
near syncope / TIA

## 2021-01-25 NOTE — PROGRESS NOTE ADULT - SUBJECTIVE AND OBJECTIVE BOX
Patient doing well.  He is s/p AICD.  Chest pain is resolved.  Hoping he can go home.     REVIEW OF SYSTEMS  Constitutional - No fever,  No fatigue  Neurological - No headaches, No memory loss, +loss of strength, No numbness, No tremors  Musculoskeletal - No joint pain, No joint swelling, No muscle pain  Psychiatric - No depression, +anxiety    FUNCTIONAL PROGRESS  Sit to stand and transfer to chair - Supervision without AD - limited balance    VITALS  T(C): 37.1 (01-25-21 @ 04:00), Max: 37.1 (01-25-21 @ 04:00)  HR: 77 (01-25-21 @ 05:43) (76 - 88)  BP: 138/84 (01-25-21 @ 05:43) (115/61 - 151/75)  RR: 16 (01-25-21 @ 05:43) (16 - 18)  SpO2: 98% (01-25-21 @ 05:43) (98% - 100%)  Wt(kg): --    MEDICATIONS   acetaminophen   Tablet .. 650 milliGRAM(s) every 6 hours PRN  aspirin enteric coated 81 milliGRAM(s) daily  atorvastatin 40 milliGRAM(s) at bedtime  carvedilol 3.125 milliGRAM(s) every 12 hours  cephalexin 500 milliGRAM(s) every 12 hours  dextrose 40% Gel 15 Gram(s) once  dextrose 5%. 1000 milliLiter(s) <Continuous>  dextrose 5%. 1000 milliLiter(s) <Continuous>  dextrose 5%. 1000 milliLiter(s) <Continuous>  dextrose 50% Injectable 25 Gram(s) once  dextrose 50% Injectable 12.5 Gram(s) once  dextrose 50% Injectable 25 Gram(s) once  gabapentin 100 milliGRAM(s) three times a day  glucagon  Injectable 1 milliGRAM(s) once  heparin   Injectable 5000 Unit(s) every 12 hours  insulin glargine Injectable (LANTUS) 20 Unit(s) at bedtime  insulin lispro (ADMELOG) corrective regimen sliding scale   three times a day before meals  insulin lispro (ADMELOG) corrective regimen sliding scale   at bedtime  sacubitril 24 mG/valsartan 26 mG 1 Tablet(s) two times a day  ticagrelor 90 milliGRAM(s) every 12 hours      RECENT LABS/IMAGING                          12.3   7.13  )-----------( 170      ( 25 Jan 2021 07:07 )             37.9     01-25    140  |  105  |  20.0  ----------------------------<  60<L>  3.9   |  27.0  |  0.78    Ca    8.4<L>      25 Jan 2021 07:07  Phos  3.1     01-24  Mg     2.2     01-24      PT/INR - ( 23 Jan 2021 15:57 )   PT: 13.8 sec;   INR: 1.20 ratio         PTT - ( 23 Jan 2021 15:57 )  PTT:35.6 sec              HEAD CT 1/20 - Gliosis/encephalomalacia of the right frontal and left occipital 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  No acute intracranial abnormality.      ----------------------------------------------------------------------------------------  PHYSICAL EXAM  Constitutional - NAD, Comfortable  Chest - PPM placement - Incision - CDI  Extremities - No C/C/E, No calf tenderness   Neurologic Exam -                    Cognitive - AAO to self, place, date, year, situation     Motor - No focal deficits     Sensory - Intact to LT     Coordination - FTN slowed, Left sided lean with ambulation - walked 150 feet/without device with intermittent assist for corrective balance due to left lean   Psychiatric - Mood stable, Affect WNL  ----------------------------------------------------------------------------------------  ASSESSMENT/PLAN  65yMale with functional deficits after feeling weak  TIA/CAD + NSVT s/p PPM - ASA, Brilinta, Lipitor  Syncope - Telemetry, Planned for EP  HTN - Coreg  DM2 - Lantus  Pain - Tylenol, Neurontin  DVT PPX - SCDs, Heparin  Rehab - Please reorder PT post PPM. Patient is at supervision level. Recommend DC HOME with HOME CARE.     Continue bedside therapy as well as OOB throughout the day with mobilization by staff to maintain cardiopulmonary function and prevention of secondary complications related to debility.     Discussed with rehab team.

## 2021-01-25 NOTE — PROGRESS NOTE ADULT - SUBJECTIVE AND OBJECTIVE BOX
[facility] [facility] - [facility]                                   [address] [place] [address]                                                      PHONE: [phone]                                                         FAX: [phone]  [email or web address]  ---------------------------------------------------------------------------------------------------------------------------------    Overnight events/patient complaints: feels well, lying flat no dyspnea      Toprol-XL (Faint; Other)    MEDICATIONS  (STANDING):  aspirin enteric coated 81 milliGRAM(s) Oral daily  atorvastatin 40 milliGRAM(s) Oral at bedtime  carvedilol 3.125 milliGRAM(s) Oral every 12 hours  cephalexin 500 milliGRAM(s) Oral every 12 hours  dextrose 40% Gel 15 Gram(s) Oral once  dextrose 5%. 1000 milliLiter(s) (50 mL/Hr) IV Continuous <Continuous>  dextrose 5%. 1000 milliLiter(s) (100 mL/Hr) IV Continuous <Continuous>  dextrose 5%. 1000 milliLiter(s) (50 mL/Hr) IV Continuous <Continuous>  dextrose 50% Injectable 25 Gram(s) IV Push once  dextrose 50% Injectable 12.5 Gram(s) IV Push once  dextrose 50% Injectable 25 Gram(s) IV Push once  gabapentin 100 milliGRAM(s) Oral three times a day  glucagon  Injectable 1 milliGRAM(s) IntraMuscular once  heparin   Injectable 5000 Unit(s) SubCutaneous every 12 hours  insulin glargine Injectable (LANTUS) 20 Unit(s) SubCutaneous at bedtime  insulin lispro (ADMELOG) corrective regimen sliding scale   SubCutaneous three times a day before meals  insulin lispro (ADMELOG) corrective regimen sliding scale   SubCutaneous at bedtime  sacubitril 24 mG/valsartan 26 mG 1 Tablet(s) Oral two times a day  ticagrelor 90 milliGRAM(s) Oral every 12 hours    MEDICATIONS  (PRN):  acetaminophen   Tablet .. 650 milliGRAM(s) Oral every 6 hours PRN Temp greater or equal to 38C (100.4F), Mild Pain (1 - 3)      Vital Signs Last 24 Hrs  T(C): 37.1 (25 Jan 2021 04:00), Max: 37.1 (25 Jan 2021 04:00)  T(F): 98.7 (25 Jan 2021 04:00), Max: 98.7 (25 Jan 2021 04:00)  HR: 77 (25 Jan 2021 05:43) (76 - 88)  BP: 138/84 (25 Jan 2021 05:43) (115/61 - 151/75)  BP(mean): 75 (25 Jan 2021 04:00) (75 - 97)  RR: 16 (25 Jan 2021 05:43) (16 - 18)  SpO2: 98% (25 Jan 2021 05:43) (98% - 100%)  Daily     Daily   ICU Vital Signs Last 24 Hrs  SHASTA MCKINLEY  I&O's Detail    24 Jan 2021 07:01  -  25 Jan 2021 07:00  --------------------------------------------------------  IN:    Oral Fluid: 460 mL  Total IN: 460 mL    OUT:  Total OUT: 0 mL    Total NET: 460 mL        I&O's Summary    24 Jan 2021 07:01  -  25 Jan 2021 07:00  --------------------------------------------------------  IN: 460 mL / OUT: 0 mL / NET: 460 mL      Drug Dosing Weight  SHASTA MCKINLEY      PHYSICAL EXAM:  General: Appears well developed, well nourished alert and cooperative.  HEENT: Head; normocephalic, atraumatic.  Eyes: Pupils reactive, cornea wnl.  Neck: Supple, no nodes adenopathy, no NVD or carotid bruit or thyromegaly.  CARDIOVASCULAR: Normal S1 and S2, No murmur, rub, gallop or lift.   LUNGS: No rales, rhonchi or wheeze. Normal breath sounds bilaterally.  ABDOMEN: Soft, nontender without mass or organomegaly. bowel sounds normoactive.  EXTREMITIES: No clubbing, cyanosis or edema. Distal pulses wnl.   SKIN: warm and dry with normal turgor.  NEURO: Alert/oriented x 3/normal motor exam. No pathologic reflexes.    PSYCH: normal affect.        LABS:                        12.3   7.13  )-----------( 170      ( 25 Jan 2021 07:07 )             37.9     01-25    140  |  105  |  20.0  ----------------------------<  60<L>  3.9   |  27.0  |  0.78    Ca    8.4<L>      25 Jan 2021 07:07  Phos  3.1     01-24  Mg     2.2     01-24      SHASTA MCKINLEY      PT/INR - ( 23 Jan 2021 15:57 )   PT: 13.8 sec;   INR: 1.20 ratio         PTT - ( 23 Jan 2021 15:57 )  PTT:35.6 sec      RADIOLOGY & ADDITIONAL STUDIES:    INTERPRETATION OF TELEMETRY (personally reviewed):    ECG:< from: 12 Lead ECG (01.23.21 @ 07:21) >  Diagnosis Line Normal sinus rhythm  Borderline Rightward axis  ST & T wave abnormality, consider lateral ischemia  Prolonged QT    Confirmed by MERNA SANON ([id number]) on 1/23/2021 1:36:24 PM    < end of copied text >      ECHO:< from: [facility] Echo Doppler (01.05.21 @ 08:34) >  Summary:   1. Left ventricular ejection fraction, by visual estimation, is 20 to 25%.   2. Multiple left ventricular regional wall motion abnormalities exist. See wall motion findings.   3. Moderate to severe right atrial enlargement.   4. Severely enlarged left atrium.   5. Elevated mean left atrial pressure.   6. Mildly increased LV wall thickness.   7. Spectral Doppler shows restrictive pattern of left ventricular myocardial filling (Grade III diastolic dysfunction).   8. There is mild concentric left ventricular hypertrophy.   9. Moderately enlarged right ventricle.  10. Severely reduced RV systolic function.  11. Severely en;larged LA with SEC in LA and LAAA w/o thrombus.  12. Moderate pleural effusion in the left lateral region.  13. Moderate mitral valve regurgitation.  14. Moderate MR by color flow, PISA ERO 0.2 cm2, no evidence of pul vein reversal.  15. Moderate tricuspid regurgitation.  16. Mild pulmonic valve regurgitation.  17. Estimated pulmonary artery systolic pressure is 50.4 mmHg assuming a right atrial pressure of 12 mmHg, which is consistent with moderate pulmonary hypertension.  18. Color flow doppler and intravenous injection of agitated saline demonstrates the presence of an intact intra atrial septum.    MD Naren Electronically signed on 1/5/2021 at 9:11:56 AM          < end of copied text >      STRESS TEST:

## 2021-01-25 NOTE — DISCHARGE NOTE NURSING/CASE MANAGEMENT/SOCIAL WORK - PATIENT PORTAL LINK FT
You can access the FollowMyHealth Patient Portal offered by [facility] by registering at the following website: [email or web address]. By joining [employer]’s FollowMyHealth portal, you will also be able to view your health information using other applications (apps) compatible with our system.

## 2021-02-25 ENCOUNTER — EMERGENCY (EMERGENCY)
Facility: HOSPITAL | Age: 66
LOS: 1 days | Discharge: HOME | End: 2021-02-25
Attending: EMERGENCY MEDICINE
Payer: MEDICARE

## 2021-02-25 VITALS
SYSTOLIC BLOOD PRESSURE: 165 MMHG | HEIGHT: 63 IN | OXYGEN SATURATION: 95 % | HEART RATE: 85 BPM | RESPIRATION RATE: 18 BRPM | DIASTOLIC BLOOD PRESSURE: 78 MMHG | TEMPERATURE: 98 F

## 2021-02-25 VITALS
OXYGEN SATURATION: 96 % | RESPIRATION RATE: 18 BRPM | SYSTOLIC BLOOD PRESSURE: 110 MMHG | HEART RATE: 90 BPM | DIASTOLIC BLOOD PRESSURE: 68 MMHG | TEMPERATURE: 98 F

## 2021-02-25 DIAGNOSIS — Z95.1 PRESENCE OF AORTOCORONARY BYPASS GRAFT: Chronic | ICD-10-CM

## 2021-02-25 LAB
ALBUMIN SERPL ELPH-MCNC: 2.8 G/DL — LOW (ref 3.3–5.2)
ALP SERPL-CCNC: 130 U/L — HIGH (ref 40–120)
ALT FLD-CCNC: 26 U/L — SIGNIFICANT CHANGE UP
ANION GAP SERPL CALC-SCNC: 9 MMOL/L — SIGNIFICANT CHANGE UP (ref 5–17)
APTT BLD: 30.3 SEC — SIGNIFICANT CHANGE UP (ref 27.5–35.5)
AST SERPL-CCNC: 34 U/L — SIGNIFICANT CHANGE UP
BASOPHILS # BLD AUTO: 0.05 K/UL — SIGNIFICANT CHANGE UP (ref 0–0.2)
BASOPHILS NFR BLD AUTO: 0.7 % — SIGNIFICANT CHANGE UP (ref 0–2)
BILIRUB SERPL-MCNC: 0.2 MG/DL — LOW (ref 0.4–2)
BUN SERPL-MCNC: 31 MG/DL — HIGH (ref 8–20)
CALCIUM SERPL-MCNC: 8.8 MG/DL — SIGNIFICANT CHANGE UP (ref 8.6–10.2)
CHLORIDE SERPL-SCNC: 100 MMOL/L — SIGNIFICANT CHANGE UP (ref 98–107)
CK SERPL-CCNC: 73 U/L — SIGNIFICANT CHANGE UP (ref 30–200)
CO2 SERPL-SCNC: 30 MMOL/L — HIGH (ref 22–29)
CREAT SERPL-MCNC: 0.91 MG/DL — SIGNIFICANT CHANGE UP (ref 0.5–1.3)
EOSINOPHIL # BLD AUTO: 0.08 K/UL — SIGNIFICANT CHANGE UP (ref 0–0.5)
EOSINOPHIL NFR BLD AUTO: 1.2 % — SIGNIFICANT CHANGE UP (ref 0–6)
GLUCOSE SERPL-MCNC: 307 MG/DL — HIGH (ref 70–99)
HCT VFR BLD CALC: 34.2 % — LOW (ref 39–50)
HGB BLD-MCNC: 11.3 G/DL — LOW (ref 13–17)
IMM GRANULOCYTES NFR BLD AUTO: 0.1 % — SIGNIFICANT CHANGE UP (ref 0–1.5)
INR BLD: 1.13 RATIO — SIGNIFICANT CHANGE UP (ref 0.88–1.16)
LYMPHOCYTES # BLD AUTO: 2.43 K/UL — SIGNIFICANT CHANGE UP (ref 1–3.3)
LYMPHOCYTES # BLD AUTO: 35 % — SIGNIFICANT CHANGE UP (ref 13–44)
MCHC RBC-ENTMCNC: 27.6 PG — SIGNIFICANT CHANGE UP (ref 27–34)
MCHC RBC-ENTMCNC: 33 GM/DL — SIGNIFICANT CHANGE UP (ref 32–36)
MCV RBC AUTO: 83.6 FL — SIGNIFICANT CHANGE UP (ref 80–100)
MONOCYTES # BLD AUTO: 0.56 K/UL — SIGNIFICANT CHANGE UP (ref 0–0.9)
MONOCYTES NFR BLD AUTO: 8.1 % — SIGNIFICANT CHANGE UP (ref 2–14)
NEUTROPHILS # BLD AUTO: 3.82 K/UL — SIGNIFICANT CHANGE UP (ref 1.8–7.4)
NEUTROPHILS NFR BLD AUTO: 54.9 % — SIGNIFICANT CHANGE UP (ref 43–77)
PLATELET # BLD AUTO: 191 K/UL — SIGNIFICANT CHANGE UP (ref 150–400)
POTASSIUM SERPL-MCNC: 4.3 MMOL/L — SIGNIFICANT CHANGE UP (ref 3.5–5.3)
POTASSIUM SERPL-SCNC: 4.3 MMOL/L — SIGNIFICANT CHANGE UP (ref 3.5–5.3)
PROT SERPL-MCNC: 7 G/DL — SIGNIFICANT CHANGE UP (ref 6.6–8.7)
PROTHROM AB SERPL-ACNC: 13 SEC — SIGNIFICANT CHANGE UP (ref 10.6–13.6)
RBC # BLD: 4.09 M/UL — LOW (ref 4.2–5.8)
RBC # FLD: 14.9 % — HIGH (ref 10.3–14.5)
SODIUM SERPL-SCNC: 138 MMOL/L — SIGNIFICANT CHANGE UP (ref 135–145)
TROPONIN T SERPL-MCNC: <0.01 NG/ML — SIGNIFICANT CHANGE UP (ref 0–0.06)
WBC # BLD: 6.95 K/UL — SIGNIFICANT CHANGE UP (ref 3.8–10.5)
WBC # FLD AUTO: 6.95 K/UL — SIGNIFICANT CHANGE UP (ref 3.8–10.5)

## 2021-02-25 PROCEDURE — 99220: CPT

## 2021-02-25 PROCEDURE — 93005 ELECTROCARDIOGRAM TRACING: CPT

## 2021-02-25 PROCEDURE — 93010 ELECTROCARDIOGRAM REPORT: CPT

## 2021-02-25 PROCEDURE — 85610 PROTHROMBIN TIME: CPT

## 2021-02-25 PROCEDURE — 85730 THROMBOPLASTIN TIME PARTIAL: CPT

## 2021-02-25 PROCEDURE — 82962 GLUCOSE BLOOD TEST: CPT

## 2021-02-25 PROCEDURE — 80053 COMPREHEN METABOLIC PANEL: CPT

## 2021-02-25 PROCEDURE — 99284 EMERGENCY DEPT VISIT MOD MDM: CPT | Mod: 25

## 2021-02-25 PROCEDURE — 99285 EMERGENCY DEPT VISIT HI MDM: CPT

## 2021-02-25 PROCEDURE — 36415 COLL VENOUS BLD VENIPUNCTURE: CPT

## 2021-02-25 PROCEDURE — 95819 EEG AWAKE AND ASLEEP: CPT | Mod: 26

## 2021-02-25 PROCEDURE — 70450 CT HEAD/BRAIN W/O DYE: CPT | Mod: 26,MA

## 2021-02-25 PROCEDURE — 70450 CT HEAD/BRAIN W/O DYE: CPT

## 2021-02-25 PROCEDURE — 96374 THER/PROPH/DIAG INJ IV PUSH: CPT

## 2021-02-25 PROCEDURE — 82550 ASSAY OF CK (CPK): CPT

## 2021-02-25 PROCEDURE — 85025 COMPLETE CBC W/AUTO DIFF WBC: CPT

## 2021-02-25 PROCEDURE — 95819 EEG AWAKE AND ASLEEP: CPT

## 2021-02-25 PROCEDURE — 84484 ASSAY OF TROPONIN QUANT: CPT

## 2021-02-25 RX ORDER — SODIUM CHLORIDE 9 MG/ML
3 INJECTION INTRAMUSCULAR; INTRAVENOUS; SUBCUTANEOUS ONCE
Refills: 0 | Status: COMPLETED | OUTPATIENT
Start: 2021-02-25 | End: 2021-02-25

## 2021-02-25 RX ORDER — LEVETIRACETAM 250 MG/1
1500 TABLET, FILM COATED ORAL ONCE
Refills: 0 | Status: COMPLETED | OUTPATIENT
Start: 2021-02-25 | End: 2021-02-25

## 2021-02-25 RX ORDER — LEVETIRACETAM 250 MG/1
1 TABLET, FILM COATED ORAL
Qty: 60 | Refills: 0
Start: 2021-02-25

## 2021-02-25 RX ADMIN — LEVETIRACETAM 400 MILLIGRAM(S): 250 TABLET, FILM COATED ORAL at 12:56

## 2021-02-25 RX ADMIN — SODIUM CHLORIDE 3 MILLILITER(S): 9 INJECTION INTRAMUSCULAR; INTRAVENOUS; SUBCUTANEOUS at 03:12

## 2021-02-25 NOTE — ED PROVIDER NOTE - OBJECTIVE STATEMENT
66 yo M presents to ED awake and alert after reportedly having a witnessed generalized seizure lasting approx 10 min prior to EMS arrival.  EMS noted pt to be post ictal on their arrival with + tongue bite.  According to EMS pt with no prior hx of seizures and s/p PPM 2 weeks ago.  Pt unable to recall any events PTA.  no recent illness or sick contacts

## 2021-02-25 NOTE — CONSULT NOTE ADULT - SUBJECTIVE AND OBJECTIVE BOX
[facility]                                                                     Perla Celis MD                                              Epilepsy Consult #: [id number] ([phone])                                               Office: [address] [place] [address], [address]                                                 Phone: [phone]; Fax: [phone]                            ==============================================    EPILEPSY INITIAL CONSULT NOTE      ADMITTING DIAGNOSIS:     HPI:  This is a 65y ambidextrous male with a history of CAD s/p single-vessel CABG LIMA to LAD 4/2010, ischemic dilated CM with CHF, s/p MDT single chamber ICD implant 1/22/21, HTN, DM, chronic CVA's in right frontal and left parietooccipital lobes on CTH (pt not aware he has any h/o CVA) who presented with first lifetime seizure as self-limiting convulsive status epilepticus.     Patient was on the couch at home, with he suddenly lost consciousness without any aura and proceeded to have a 10m full body convulsion with tongue bite. Spontaneously resolved. CTH only showed old infarcts in bilateral hemispheres, but posterior-temporal states he is not aware he has ever had a stroke. Routine EEG picked up right frontal sharp wave discharges.     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 Patient was a product of normal pregnancy and delivery. No history of febrile seizure, TBI, CNS infection, or FH of seizures.    CURRENT AED:  gabapentin 100mg TID for neuropathy    PREVIOUS AED:  none    IMAGING:   CTH 2/25/21 ([facility]): Gliosis and encephalomalacia is again seen in the right frontal and left parieto-occipital lobes likely a sequela of prior infarction. An old lacunar infarct is noted in the right thalamus.     NEUROPHYSIOLOGY:  rEEG 2/25/21 ([facility]): 1) RF sharps; 2) intermittent and independent slowing in the bilateral hemispheres     NEUROPSYCHOLOGY:   none    PMH:  Abnormal nuclear stress test    Depression    Diabetes    HTN (Hypertension)  controlled with meds    CAD (Coronary Artery Disease)  LAD occlusion        PSH:  S/P CABG (coronary artery bypass graft)    S/P Inguinal Hernia Repair  ishaan with mesh 2008    HOME MEDICATION:  aspirin 81 mg oral delayed release tablet: 1 tab(s) orally once a day (04 Jan 2021 19:27)  atorvastatin 40 mg oral tablet: 1 tab(s) orally once a day (04 Jan 2021 19:27)  gabapentin 100 mg oral capsule: 1 cap(s) orally 3 times a day (04 Jan 2021 19:27)  Lantus 100 units/mL subcutaneous solution: 20 unit(s) subcutaneous once a day (at bedtime) (04 Jan 2021 19:27)  NovoLOG 100 units/mL subcutaneous solution: 15 unit(s) subcutaneous (04 Jan 2021 19:27)      ALLERGIES:  Toprol-XL (Faint; Other)    FH:  noncontributory    SH:  Denied EtOH, tobacco, illicit drugs.      ROS:  Negative for constitutional, skin, eyes, ENT, respiratory, cardiovascular, gastrointestinal, genitourinary, musculoskeletal, neurologic, psychiatric, hematology/lymphatics, endocrine, allergic/immunologic.    VITALS:  T(C): 36.7 (02-25-21 @ 14:47), Max: 36.7 (02-25-21 @ 02:49)  HR: 85 (02-25-21 @ 14:47) (84 - 85)  BP: 135/70 (02-25-21 @ 14:47) (116/69 - 165/78)  ABP: --  RR: 17 (02-25-21 @ 14:47) (17 - 18)  SpO2: 96% (02-25-21 @ 14:47) (95% - 98%)  CVP(cm H2O): --    GENERAL PHYSICAL EXAM:  GEN: no distress  HEENT: NCAT, OP clear  EYES: no nystagmus  NECK: supple  CV: RRR, no murmur     		  PULM: CTAB, no wheezing  ABD: soft, +BS, NT  EXT: peripheral pulse intact, no cyanosis  MSK: muscle tone and strength normal  SKIN: warm, dry, no rash or lesion on exposed skin    NEUROLOGICAL EXAM:  Mental Status  Orientation: alert and oriented to person, place, time, and situation   Language: clear and fluent, intact comprehension and repetition, intact naming and reading    Cranial Nerves  II: difficult to assess for visual field cut due to suspected cataract and baseline poor eye sight in the right eye  III, IV, VI: PERRL, EOMI  V, VII: facial sensation and movement intact and symmetric   VIII: hearing intact   IX, X: uvula midline, soft palate elevates normally   XI: BL shoulder shrug intact   XII: tongue midline    Motor  5/5 BUE and BLE                 Tone and bulk are normal in upper and lower limbs  No pronator drift    Sensation  Intact to light touch and pinprick in all 4 EXTs    Reflex  2+ in BL biceps and patella                                    Plantar responses downward bilaterally    Coordination  Normal FTN bilaterally    Gait  Deferred      LABS:                          11.3   6.95  )-----------( 191      ( 25 Feb 2021 03:24 )             34.2     02-25    138  |  100  |  31.0<H>  ----------------------------<  307<H>  4.3   |  30.0<H>  |  0.91    Ca    8.8      25 Feb 2021 03:24    TPro  7.0  /  Alb  2.8<L>  /  TBili  0.2<L>  /  DBili  x   /  AST  34  /  ALT  26  /  AlkPhos  130<H>  02-25    CAPILLARY BLOOD GLUCOSE      POCT Blood Glucose.: 284 mg/dL (25 Feb 2021 02:45)    LIVER FUNCTIONS - ( 25 Feb 2021 03:24 )  Alb: 2.8 g/dL / Pro: 7.0 g/dL / ALK PHOS: 130 U/L / ALT: 26 U/L / AST: 34 U/L / GGT: x           PT/INR - ( 25 Feb 2021 03:24 )   PT: 13.0 sec;   INR: 1.13 ratio         PTT - ( 25 Feb 2021 03:24 )  PTT:30.3 sec

## 2021-02-25 NOTE — ED PROVIDER NOTE - PATIENT PORTAL LINK FT
You can access the FollowMyHealth Patient Portal offered by [facility] by registering at the following website: [email or web address]. By joining [employer]’s FollowMyHealth portal, you will also be able to view your health information using other applications (apps) compatible with our system.

## 2021-02-25 NOTE — ED ADULT NURSE NOTE - OBJECTIVE STATEMENT
pt BIBEMS s/p tonic clonic seizure at home while lying on couch. pt denies hx of seizures.  Denies head trauma. Pt does not remember seizure. Pt states he feels better and wants to go home.

## 2021-02-25 NOTE — ED ADULT NURSE REASSESSMENT NOTE - NS ED NURSE REASSESS COMMENT FT1
upon pt discharge pt stood up to change clothes and became dizzy. MD called and FS checked. pt provided juice and meal, reports feeling better. FS improved at this time. pt ambulated with steady gait. discharge instructions provided to pt and pt daughter. pt d/c in stable condition, no apparent distress noted at this time. pt A&Ox3. pt in no distress at d/c. upon pt discharge pt stood up to change clothes and became dizzy. MD called and FS checked. pt provided juice and meal, reports feeling better. FS improved at this time. per MD pt safe for discharge. pt ambulated with steady gait. discharge instructions provided to pt and pt daughter. pt d/c in stable condition, no apparent distress noted at this time. pt A&Ox3. pt in no distress at d/c.

## 2021-02-25 NOTE — ED ADULT NURSE REASSESSMENT NOTE - NS ED NURSE REASSESS COMMENT FT1
assumed care of pt @ 0730. pt AOx4 in NAD, denies any complaints at this time. respirations even and unlabored. denies chest discomfort. no neuro deficits noted. abd soft and nondistended. skin WNL for race.

## 2021-02-25 NOTE — ED PROVIDER NOTE - CARE PROVIDER_API CALL
Perla Celis)  EEGEpilepsy; Neurology  [address] [place] [address]  Phone: [phone]  Fax: [phone]  Follow Up Time: 4-6 Days    Arcenio Sanchez; PhD)  Neurology; Vascular Neurology  [address] [facility], [facility] [address]  [place] [address]  Phone: [phone]  Fax: [phone]  Follow Up Time: 4-6 Days

## 2021-02-25 NOTE — ED PROVIDER NOTE - CLINICAL SUMMARY MEDICAL DECISION MAKING FREE TEXT BOX
pt with new onset seizure, priority CT head ordered, Will check labs, EKG, monitor, Cardio eval and re-eval

## 2021-02-25 NOTE — ED PROVIDER NOTE - ENMT, MLM
Airway patent, Nasal mucosa clear. Mouth with normal mucosa. Throat has no vesicles, no oropharyngeal exudates and uvula is midline, + tongue abrasion R lateral tongue

## 2021-02-25 NOTE — ED PROVIDER NOTE - CARE PROVIDERS DIRECT ADDRESSES
Review of Systems Health Update:   What is your biggest concern for today's visit? establish    GENERAL / CONSTITUTIONAL:  [x]  YES    []  NO   Excessive fatigue.  []  YES    [x]  NO   Unexplained weight loss   []  YES    [x]  NO   Have you traveled outside of the [place] in the past year?   If so, where:     EARS, NOSE, MOUTH AND THROAT:  []  YES    [x]  NO   Hoarseness or voice change.  []  YES    [x]  NO   Difficult or painful swallowing.    HEART:  []  YES    [x]  NO   Chest pain  []  YES    [x]  NO   Palpitation or irregular heart beat.    LUNGS:   []  YES    [x]  NO   Coughing up blood.   []  YES    [x]  NO   Chronic cough.  []  YES    [x]  NO   Wheezing.  []  YES    [x]  NO   Shortness of Breath    INTESTINAL SYSTEM:  []  YES    [x]  NO   Tarry (black) stools.  []  YES    [x]  NO   Recurrent abdominal pain.  []  YES    [x]  NO   Frequent nausea or vomiting.    URINARY SYSTEM:   []  YES    [x]  NO   Difficult or painful urination.  [x]  YES    []  NO   Urination more than once a night.  []  YES    [x]  NO   Bloody Urine    SKELETON AND JOINTS:  []  YES    [x]  NO   Swollen or painful joints.   []  YES    [x]  NO   Gout.   Which joints:     SKIN:  []  YES    [x]  NO   Recurrent skin rash.   []  YES    [x]  NO   Moles that have changed in size or color.    NERVOUS SYSTEM:   []  YES    [x]  NO   Frequent or severe headaches.  []  YES    [x]  NO   Loss of consciousness/concussion.  []  YES    [x]  NO   Weakness or recurrent numbness or tingling in your arms or legs.    PSYCHIATRIC:  Depression Screening  Over the last two weeks, how often have you been bothered by any of the following problems?  [x]  YES    []  NO   Little interest or pleasure in doing things.    [x]  YES    []  NO   Feeling down, depressed or hopeless.   [x]  YES    []  NO   Feeling nervous, anxious or on edge.  [x]  YES    []  NO   Not being able to stop or control worrying.     ENDOCRINE:  []  YES    [x]  NO   History of diabetes.  []  YES    [x]   NO   History of thyroid disease.     HEMATOLOGIC:   []  YES    [x]  NO   Swollen glands or lymph nodes.  [x]  YES    []  NO   History of anemia.   []  YES    [x]  NO   History of blood clots.    IMMUNE SYSTEM:  []  YES    [x]  NO   History of AIDS.  []  YES    [x]  NO   Asthma.    FEMALE HEALTH UPDATE:  []  YES    [x]  NO   Any problems with irregular menstrual periods, painful periods or spotting.  Approximate date of last menstrual period:  7/1/2020   How far apart are your periods:  30 days   How many days do you flow?  5   Amount of flow:  Scant []     Moderate  [x]    Heavy  []   Number of pregnancies:  1  Number of living children:  1  []  YES    [x]  NO   Are you trying to get pregnant?   []  YES    [x]  NO   Do you use birth control (including tubal or partner with vasectomy)?   If yes, what type:    [x]  YES    []  NO   Have you had an abnormal cervical pap smear?  []  YES    [x]  NO   Have you had a hysterectomy?    LIFESTYLE HABITS:    [x]  YES    []  NO   Do you drink alcohol?   Quantity consumed per week on average: Beer  Drinks 2  []  YES    [x]  NO   In the past year have you ever drank or used drugs more than you meant to?  []  YES    [x]  NO   Have you felt you wanted or needed to cut down on your drinking or drug use in the past year?  []  YES    [x]  NO   Have you been annoyed by friends or family that suggested you cut back on your drinking or use of drugs?  []  YES    [x]  NO   Have you ever shared hypodermic needles?   []  YES    [x]  NO   Have you used street drugs in the past 2 years?   How many days per week do you exercise for 30 minutes or more: 3 to 4  []  YES    [x]  NO   Do you smoke?   If you are a former smoker when did you quit? 2018   If you smoke, how many packs per day?    []  YES    [x]  NO   Are you interested in and/or ready to quit smoking?  []  YES    [x]  NO   Do you wear your seatbelt?    SEXUAL AND GENDER HISTORY:  []  MALE    [x]  FEMALE   Sex assigned at birth.  []  MALE     [x]  FEMALE   Present gender identity.   Do you identify as Heterosexual     []  YES    [x]  NO  []  PAST   Hormonal therapy  []  YES    [x]  NO   Do you have concerns about your sexual practices that you wish to discuss?  []  YES    [x]  NO   Do you want to be screened for AIDS?     SCREENING FOR INTIMATE PARTNER VIOLENCE:  How often does your partner physically hurt you? 1  How often does your partner insult or talk down to you? 1  How often does your partner threaten you with physical harm? 1  How often does your partner scream at you? 1  [x]  YES    []  NO   Do you currently feel safe in your present living situation?       Health Maintenance Due   Topic Date Due   • Depression Screening  07/02/2020       Patient is due for topics as listed above but is not proceeding with Depression Screening  at this time.                            [email or web address]

## 2021-02-25 NOTE — ED PROVIDER NOTE - CARDIAC, MLM
Normal rate, regular rhythm.  Heart sounds S1, S2. + Gr 2/6 ELAINE, + healed m/l sternotomy, + palp PPM L upper chest wall

## 2021-02-25 NOTE — ED ADULT TRIAGE NOTE - CHIEF COMPLAINT QUOTE
pt BIBA, s/p tonic clonic seizure at home while lying on couch. pt with no hx of seizures, per wife at home seizure lasted ~10 minutes, no head trauma. pt does not remember seizure, offers no complaints at this time. A+Ox4 at this time. fingerstick 284. Dr King called to bedside, priority CT called.

## 2021-02-25 NOTE — ED PROVIDER NOTE - CONSTITUTIONAL, MLM
normal... Elderly M, awake, alert, oriented to person, place, time/situation and in no apparent distress.

## 2021-02-25 NOTE — CONSULT NOTE ADULT - ASSESSMENT
65y ambidextrous male with a history of CAD s/p single-vessel CABG LIMA to LAD 4/2010, ischemic dilated CM with CHF, s/p MDT single chamber ICD implant 1/22/21, HTN, DM, chronic CVA's in right frontal and left parietooccipital lobes on CTH (pt not aware he has any h/o CVA) who presented with first lifetime seizure as self-limiting convulsive status epilepticus. Personally reviewed all imagines, labs, EEG and other studies.    Impression:  1. Newly diagnosed epilepsy: Presenting as convulsive status epilepticus. Etiology likely due to old infarcts.  2. Chronic CVA's in right frontal and left parietooccipital lobes: Patient not aware of any prior history of stroke. Because infarcts are in two different vascular territories on two hemispheres, concerning for embolic etiology. Recently had ICD implanted. No documented history of afib.  3. Extensive cardiac history, HTN, DM      Recommendation:  - s/p rEEG  - start levetiracetam: 1500mg IV x1 -> 750mg BID  - continue ASA, statin, antihypertensive and diabetic meds  - no driving  - seizure precaution  - follow-up with me outpatient: [facility] [place] ([phone]), [facility] [place] [address]   - recommend following with neurovascular specialist and established cardiologist to further eval for possibility of paroxysmal afib and stroke preventatives       Thank you for allowing Epilepsy to participate in the care of this patient.   ______________________  Perla Celis MD   [facility] Epilepsy  Cell: [phone]  Epilepsy Consult #: [phone] ([phone])

## 2021-02-25 NOTE — EEG REPORT - NS EEG TEXT BOX
[facility]   COMPREHENSIVE EPILEPSY CENTER   REPORT OF ROUTINE VIDEO EEG     [facility]: [address], [place] [address], Ph#: [phone]  [facility]: [address], [place] [address], Ph#: [phone]  [facility]: [address] [place] [address], Ph#: [phone]    Patient Name: SHASTA MCKINLEY  Age and : 65y (55)  MRN #: [id number]  Location: [facility] ED  Referring Physician: Not Available Doctor    Study Date: 21    _____________________________________________________________  TECHNICAL INFORMATION    Placement and Labeling of Electrodes:  The EEG was performed utilizing 20 channels referential EEG connections (coronal over temporal over parasagittal montage) using all standard 10-20 electrode placements with EKG.  Recording was at a sampling rate of 256 samples per second per channel.  Time synchronized digital video recording was done simultaneously with EEG recording.  A low light infrared camera was used for low light recording.  Renzo and seizure detection algorithms were utilized.    _____________________________________________________________  HISTORY    Patient is a 65y old  Male who presents with a chief complaint of convulsive status epilepticus.    PERTINENT MEDICATION:  S/p levetiracetam 1500mg IV    _____________________________________________________________  STUDY INTERPRETATION    Findings: The background was continuous, spontaneously variable and reactive. During wakefulness, No posterior dominant rhythm seen.    Background Slowing:  No generalized background slowing was present.    Focal Slowing:   Intermittent, independent polymorphic delta slowing in the bilateral hemispheres during drowsiness.    Sleep Background:  Drowsiness was characterized by fragmentation, attenuation, and slowing of the background activity.    Sleep was characterized by the presence of vertex waves, symmetric sleep spindles and K-complexes.    Other Non-Epileptiform Findings:  None were present.    Interictal Epileptiform Activity:   Occasional right frontal (max Fp2/F4) sharp wave discharges.    Events:  Clinical events: None recorded.  Seizures: None recorded.    Activation Procedures:   Hyperventilation was not performed.    Photic stimulation was performed and did not elicit any abnormality.     Artifacts:  Intermittent myogenic and movement artifacts were noted.    ECG:  The heart rate on single channel ECG was predominantly between 60-70 BPM.    _____________________________________________________________  EEG SUMMARY/CLASSIFICATION    Abnormal EEG in the awake, drowsy and asleep states.  - Occasional right frontal (max Fp2/F4) sharp wave discharges.  - Intermittent, independent polymorphic delta slowing in the bilateral hemispheres during drowsiness.    _____________________________________________________________  EEG IMPRESSION/CLINICAL CORRELATE    Abnormal EEG study.  1. Potential epileptogenic focus in the right frontal region.   2. Functional abnormality in the bilateral hemispheres.  3. No seizure seen.    _____________________________________________________________    Perla Celis MD  Attending Physician, [facility]

## 2021-02-25 NOTE — ED PROVIDER NOTE - PROVIDER TOKENS
PROVIDER:[TOKEN:[[id number]],FOLLOWUP:[4-6 Days]],PROVIDER:[TOKEN:[[id number]],FOLLOWUP:[4-6 Days]]

## 2021-02-25 NOTE — ED PROVIDER NOTE - PROGRESS NOTE DETAILS
Kun: pt signed ou tt o me pending neuro/epilepsy. with spikes on eeg. started on keppra 750mg bid per Dr. Celis to f/u with neuro, rogove for stroke w/u given old sstroke on ct per Dr. Celis request and cards f/u.

## 2021-02-25 NOTE — ED PROVIDER NOTE - NEUROLOGICAL, MLM
Alert and oriented, no focal deficits, no motor or sensory deficits, CN 2-12 intact, MS 5/5 b/l UE/LE's

## 2021-03-11 ENCOUNTER — APPOINTMENT (OUTPATIENT)
Dept: PULMONOLOGY | Facility: CLINIC | Age: 66
End: 2021-03-11
Payer: MEDICARE

## 2021-03-11 VITALS
WEIGHT: 136 LBS | DIASTOLIC BLOOD PRESSURE: 76 MMHG | BODY MASS INDEX: 24.1 KG/M2 | HEIGHT: 63 IN | SYSTOLIC BLOOD PRESSURE: 124 MMHG | HEART RATE: 84 BPM | TEMPERATURE: 97.7 F | OXYGEN SATURATION: 98 %

## 2021-03-11 DIAGNOSIS — Z86.39 PERSONAL HISTORY OF OTHER ENDOCRINE, NUTRITIONAL AND METABOLIC DISEASE: ICD-10-CM

## 2021-03-11 DIAGNOSIS — I42.9 HEART FAILURE, UNSPECIFIED: ICD-10-CM

## 2021-03-11 DIAGNOSIS — I25.10 ATHEROSCLEROTIC HEART DISEASE OF NATIVE CORONARY ARTERY W/OUT ANGINA PECTORIS: ICD-10-CM

## 2021-03-11 DIAGNOSIS — R06.02 SHORTNESS OF BREATH: ICD-10-CM

## 2021-03-11 DIAGNOSIS — I50.9 HEART FAILURE, UNSPECIFIED: ICD-10-CM

## 2021-03-11 PROCEDURE — 99204 OFFICE O/P NEW MOD 45 MIN: CPT

## 2021-03-11 PROCEDURE — 99072 ADDL SUPL MATRL&STAF TM PHE: CPT

## 2021-03-11 RX ORDER — SACUBITRIL AND VALSARTAN 24; 26 MG/1; MG/1
24-26 TABLET, FILM COATED ORAL
Refills: 0 | Status: ACTIVE | COMMUNITY

## 2021-03-11 RX ORDER — ASPIRIN 81 MG
81 TABLET, DELAYED RELEASE (ENTERIC COATED) ORAL
Refills: 0 | Status: ACTIVE | COMMUNITY

## 2021-03-11 RX ORDER — INSULIN ASPART 100 [IU]/ML
100 INJECTION, SOLUTION INTRAVENOUS; SUBCUTANEOUS
Refills: 0 | Status: ACTIVE | COMMUNITY

## 2021-03-11 RX ORDER — INSULIN GLARGINE 100 [IU]/ML
100 INJECTION, SOLUTION SUBCUTANEOUS
Refills: 0 | Status: ACTIVE | COMMUNITY

## 2021-03-11 RX ORDER — ATORVASTATIN CALCIUM 40 MG/1
40 TABLET, FILM COATED ORAL
Refills: 0 | Status: ACTIVE | COMMUNITY

## 2021-03-11 RX ORDER — SPIRONOLACTONE 25 MG/1
25 TABLET ORAL
Refills: 0 | Status: ACTIVE | COMMUNITY

## 2021-03-11 RX ORDER — TICAGRELOR 90 MG/1
90 TABLET ORAL
Refills: 0 | Status: ACTIVE | COMMUNITY

## 2021-03-11 RX ORDER — GABAPENTIN 100 MG/1
100 CAPSULE ORAL
Refills: 0 | Status: ACTIVE | COMMUNITY

## 2021-03-11 NOTE — REVIEW OF SYSTEMS
[SOB on Exertion] : sob on exertion [Edema] : edema [Numbness] : numbness [Diabetes] : diabetes [Negative] : Psychiatric [TextBox_119] : peripheral neuropathy

## 2021-03-11 NOTE — PHYSICAL EXAM
[No Acute Distress] : no acute distress [Normal Oropharynx] : normal oropharynx [Normal Appearance] : normal appearance [No Neck Mass] : no neck mass [Normal Rate/Rhythm] : normal rate/rhythm [Normal S1, S2] : normal s1, s2 [No Murmurs] : no murmurs [No Resp Distress] : no resp distress [Clear to Auscultation Bilaterally] : clear to auscultation bilaterally [Surgical scars] : surgical scars [Benign] : benign [No Clubbing] : no clubbing [No Cyanosis] : no cyanosis [FROM] : FROM [1+ Pitting] : 1+ pitting [Normal Color/ Pigmentation] : normal color/ pigmentation [No Focal Deficits] : no focal deficits [Oriented x3] : oriented x3 [Normal Affect] : normal affect [TextBox_54] : D [TextBox_68] : Decreased at right base [TextBox_99] : Gait somewhat shuffled

## 2021-03-11 NOTE — REASON FOR VISIT
[Consultation] : a consultation [Abnormal CXR/ Chest CT] : an abnormal CXR/ chest CT [Shortness of Breath] : shortness of breath [Spouse] : spouse

## 2021-03-11 NOTE — CONSULT LETTER
[Dear  ___] : Dear  [unfilled], [Consult Letter:] : I had the pleasure of evaluating your patient, [unfilled]. [Please see my note below.] : Please see my note below. [Consult Closing:] : Thank you very much for allowing me to participate in the care of this patient.  If you have any questions, please do not hesitate to contact me. [Sincerely,] : Sincerely, [FreeTextEntry3] : Sarthak Valles MD\par  [DrJuanita  ___] : Dr. SYLVESTER

## 2021-03-24 PROBLEM — G40.802 EPILEPSY WITH BOTH GENERALIZED AND FOCAL FEATURES: Status: ACTIVE | Noted: 2021-01-01

## 2021-03-24 PROBLEM — Z78.9 NON-SMOKER: Status: ACTIVE | Noted: 2021-01-01

## 2021-03-24 NOTE — HISTORY OF PRESENT ILLNESS
[FreeTextEntry1] : PCP: Dr. Brian\par Cardiology: Dr. Autumn Barton (phone: [phone], fax: [phone])\par Wife: Promise\par \par This is a 65 year-old ambidextrous male with a history of CAD s/p single-vessel CABG LIMA to LAD 4/2010, ischemic dilated CM with CHF, s/p MDT single chamber ICD implant 1/22/21, HTN, DM, chronic CVA's in right frontal and left parietooccipital lobes on CTH (pt not aware he has any h/o CVA), right vision impairment (legally blind) who presents for posthospital followup for newly diagnosed epilepsy presenting as self-limiting CSE.\par \par Patient presented to [facility] ER on 2/25 with new onset self-limiting CSE. Patient was on the couch at home, when he suddenly lost consciousness without any aura and proceeded to have a 10m full body convulsion with tongue bite. Spontaneously resolved. CTH showed old infarcts in bilateral hemispheres, but pt states he is not aware he has ever had a stroke. Routine EEG in ER with right frontal sharp wave discharges. He was discharged with LEV 750mg BID. Tolerating it well. No further sz.\par \par Because of chronic infarcts in two different vascular territories in BL hemispheres, concerning for embolic etiology. Recently had ICD implanted. No documented history of afib. He was instructed to followup with his cardiologist, Dr. Autumn Barton, to eval for afib. Pt never did. Already on ASA 81mg daily, statin and Brilinta. 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 Patient was a product of normal pregnancy and delivery. No history of febrile seizure, TBI, CNS infection, or FH of seizures.\par \par CURRENT AEDs:\par LEV 750mg BID - started 2/2021\par gabapentin 100mg TID - for neuropathy\par \par PREVIOUS AED:\par none\par \par IMAGING: \par CTH 2/25/21 ([facility]): Gliosis and encephalomalacia is again seen in the right frontal and left parieto-occipital lobes likely a sequela of prior infarction. An old lacunar infarct is noted in the right thalamus. \par \par NEUROPHYSIOLOGY:\par rEEG 2/25/21 ([facility]): 1) RF sharps; 2) intermittent and independent slowing in the bilateral hemispheres \par \par NEUROPSYCHOLOGY: \par rEEG 2/25/21 ([facility]): 1) RF sharps, 2) intermittent, independent bihemispheric slowing\par \par NEUROPSYCHOLOGY: \par none\par \par PMH:\par epilepsy, CAD s/p single-vessel CABG LIMA to LAD 4/2010, ischemic dilated CM with CHF, s/p MDT single chamber ICD implant 1/22/21, HTN, DM, chronic CVA's in right frontal and left parietooccipital lobes on CTH (pt not aware he has any h/o CVA) \par \par PSH:\par S/P CABG (coronary artery bypass graft)\par S/P Inguinal Hernia Repair\par ishaan with mesh 2008\par \par OTHER MEDICATION:\par \par ALLERGIES:\par Toprol-XL \par \par FH:\par noncontributory\par \par SH:\par Denied EtOH, tobacco, illicit drugs.

## 2021-03-24 NOTE — ASSESSMENT
[FreeTextEntry1] : SHASTA MCKINLEY is a 65 year-old ambidextrous male with a history of CAD s/p single-vessel CABG LIMA to LAD 4/2010, ischemic dilated CM with CHF, s/p MDT single chamber ICD implant 1/22/21 (unknown if MRI compatible), HTN, DM, chronic CVA's in right frontal and left parietooccipital lobes on CTH (pt not aware he has any h/o CVA), right vision impairment (legally blind) who presents for followup for focal epilepsy that presented as self-limiting CSE. EEG with RF sharps. Personally reviewed all images, labs and other studies. \par \par Hasn’t had further seizures on LEV. However, still needs workup for etiology of old infarcts of different vascular territory. Patient was educated regarding risks and driving privileges associated with the [facility] Guidelines; patient does not drive. All questions and concerns answered and addressed in detail to patient's and wife’s complete satisfaction. Patient and wife verbalized understanding and agreed to plan.\par \par \par - continue LEV 750mg BID\par - continue ASA 81mg daily, Brilinta and statin\par - 48hr aEEG\par - CBC, CMP, LEV trough level\par - refer to Breanna Hunt NP for stroke management\par - pt to followup with Dr. Barton for afib eval (will fax my note to Dr. Barton)\par - does not drive \par - f/u in 3 months\par \par \par All relevant epilepsy AAN quality care measures were addressed and discussed with the patient and wife.\par \par More than 50% of time spent counseling and educating patient and wife about epilepsy specific safety issues including AED side effects and interactions, alcohol consumption, sleep deprivation, risks and driving privileges associated with the [place] Guidelines, death related to seizures/SUDEP, seizure 1st aid and risks. Patient and wife are educated on seizure precautions, including no driving, no operating machinery, no swimming or bathing, no climbing heights, or engage in any risky activities during which a seizure could cause further injury to pt or others.

## 2021-03-24 NOTE — CONSULT LETTER
[Dear  ___] : Dear  [unfilled], [Sincerely,] : Sincerely, [FreeTextEntry1] : I had the pleasure of seeing our mutual patient, SHASTA MCKINLEY, in my office today. Please see my note below. He has chronic cerebral infarcts in different vascular territories, concerning for embolic source. Please interrogate his ICD for arrhythmia. \par \par If you have any questions, do not hesitate to contact me.\par  [FreeTextEntry3] : Perla Celis MD\par  of Neurology\par [facility]\par [facility]\par [facility] [place]\par [address] [place] [address]\par Phone: [phone]; Fax: [phone]\par \par

## 2021-03-24 NOTE — PHYSICAL EXAM
[FreeTextEntry1] : GENERAL PHYSICAL EXAM:\par GEN: no distress, normal affect\par HEENT: NCAT, OP clear\par EYES: sclera white, conjunctiva clear, no nystagmus\par NECK: supple\par CV: RRR    		\par PULM: CTAB, no wheezing\par GI: soft ABD, +BS, NT, ND\par EXT: peripheral pulse intact, no cyanosis\par MSK: muscle tone and strength normal\par SKIN: warm, dry, no rash or lesion on exposed skin \par \par NEUROLOGICAL EXAM:\par Mental Status\par Orientation: alert and oriented to person, place, time, and situation \par Language: clear and fluent, intact comprehension and repetition, intact naming and reading\par \par Cranial Nerves\par II: right vision loss, poor left vision, difficult to evaluate for field cut\par III, IV, VI: PERRL, EOMI\par V, VII: facial sensation and movement intact and symmetric \par VIII: hearing intact \par IX, X: uvula midline, soft palate elevates normally \par XI: BL shoulder shrug intact \par XII: tongue midline\par \par Motor\par Shoulder abd: 5 (R), 5 (L)\par EF/EE: 5 (R), 5 (L)\par WF/WE: 5 (R), 5 (L)\par hand : 5 (R), 5 (L)\par HF/HE: 5 (R), 5 (L)\par KF/KE: 5 (R), 5 (L)\par DF/PF: 5 (R), 5 (L)                \par Tone and bulk are normal in upper and lower limbs\par No pronator drift\par \par Sensation\par Intact to light touch and pinprick in all 4 EXTs\par \par Reflex\par 2+ in BL biceps, triceps, brachioradialis, patella, ankle                                    \par Plantar responses downward bilaterally\par \par Coordination\par Normal FTN bilaterally\par \par Gait\par Normal stance, stride, and pivot turn\par Tandem walk intact\par Negative Romberg\par \par

## 2021-03-31 PROBLEM — Z86.69 HISTORY OF VISUAL IMPAIRMENT: Status: RESOLVED | Noted: 2021-01-01 | Resolved: 2021-01-01

## 2021-03-31 PROBLEM — Z86.79 HISTORY OF HYPERTENSION: Status: RESOLVED | Noted: 2021-01-01 | Resolved: 2021-01-01

## 2021-03-31 PROBLEM — Z97.3 WEARS GLASSES: Status: RESOLVED | Noted: 2021-01-01 | Resolved: 2021-01-01

## 2021-03-31 PROBLEM — Z86.79 HISTORY OF CORONARY ARTERY DISEASE: Status: RESOLVED | Noted: 2021-01-01 | Resolved: 2021-01-01

## 2021-04-02 NOTE — DISCUSSION/SUMMARY
[Antithrombotic therapy with ___] : antithrombotic therapy with  [unfilled] [Intensive Blood Pressure Control] : intensive blood pressure control [Lipid Lowering Therapy] : lipid lowering therapy [Patient encouraged to discuss with Primary MD] : I encouraged the patient to discuss these important issues with ~his/her~ primary care doctor [Goals and Counseling] : I have reviewed the goals of stroke risk factor modification. I counseled the patient on measures to reduce stroke risk, including the importance of medication compliance, risk factor control, exercise, healthy diet and avoidance of smoking. I reviewed stroke warning signs and symptoms and appropriate actions to take if such occur. [FreeTextEntry1] : Mr. Felix is a 65 year-old man with chronic infarcts in two different vacular territories in bilateral hemispheres, concerning for embolic etiology. I consulted with Mr. Barton about beginning anticoagulation in lieu or in addition to antiplatelet therapy. Her office will call patient today to schedule a HUSEYIN and ILR placement, as single chamber AICD may not detect a.fib. Dr. Barton to assess the need for anticoagulation therapy after testing is performed. Continue Brilinta and ASA as prescribed. Follow-up with PCP regarding elevated glucose levels on recent lab work. Blood work for Hgb A1C and lipid levels ordered. Consult made to neuro opthalmology. Follow-up with me in one month. All of their questions and concerns were addressed.

## 2021-04-02 NOTE — HISTORY OF PRESENT ILLNESS
[FreeTextEntry1] : PCP: Dr. Brian ([place])\par Cardiology: Dr. Autumn Barton (phone: [phone], fax: [phone])\par Wife: Promise\par \par Entire interview conducted with [employer]  Jailene, ID# [id number]. Mr. Felix is a 65 year-old man with a history of CAD s/p single-vessel CABG LIMA to LAD 4/2010, ischemic dilated CM with CHF low EF s/p MDT single chamber ICD implant 1/22/21, HTN, DM, chronic CVA's in right frontal and left parietooccipital lobes on CTH (he was previously unaware that he had h/o CVA), vision impairment (legally blind) who presents for posthospital followup for after new diagnosis of epilepsy. CT brain showed chronic infarcts in two different vacular territories in bilateral hemispheres, concerning for embolic etiology. As per patient, he has an upcoming appointment with cardiologist, Dr. Autumn Barton. Patient is currently taking ASA 81mg QD, Brilinta and statin therapy. He reports chronic vision loss, which he has been experiencing for over a year. He denies any new TIA or stroke-like symptoms. \par \par Previous Work-Up: \par CT head 3/2021: Mild parenchymal volume loss is noted with prominent ventricles and sulci. Gliosis and encephalomalacia is again seen in the right frontal and left parieto-occipital lobes likely a sequela of prior infarction. An old lacunar infarct is noted in the right thalamus. No acute territorial infarct is demonstrated, unchanged from 1/2021.\par Bilateral carotid duplex (1/2021): No significant hemodynamic stenosis of either carotid artery.

## 2021-04-02 NOTE — REVIEW OF SYSTEMS
[Eyesight Problems] : eyesight problems [As noted in HPI] : as noted in HPI [Negative] : Genitourinary [Confused or Disoriented] : no confusion [Memory Lapses or Loss] : no memory loss [Decr. Concentrating Ability] : no decrease in concentrating ability [Difficulty with Language] : no ~M difficulty with language [Changed Thought Patterns] : no change in thought patterns [Facial Weakness] : no facial weakness [Arm Weakness] : no arm weakness [Hand Weakness] : no hand weakness [Leg Weakness] : no leg weakness [Poor Coordination] : good coordination [Numbness] : no numbness [Tingling] : no tingling [Seizures] : no convulsions [Dizziness] : no dizziness [Fainting] : no fainting [Lightheadedness] : no lightheadedness [Difficulty Walking] : no difficulty walking [Inability to Walk] : able to walk [Ataxia] : no ataxia

## 2021-04-02 NOTE — CONSULT LETTER
[Dear  ___] : Dear  [unfilled], [Courtesy Letter:] : I had the pleasure of seeing your patient, [unfilled], in my office today. [Please see my note below.] : Please see my note below. [Sincerely,] : Sincerely, [DrJuanita  ___] : Dr. SYLVESTER [FreeTextEntry3] : Breanna Hunt NP\par [facility] [place]\par [address] [place] [address]\par Phone: [phone]; Fax: [phone]

## 2021-04-02 NOTE — PHYSICAL EXAM
[FreeTextEntry1] : GENERAL PHYSICAL EXAM:\par GEN: no distress, normal affect\par HEENT: NCAT, OP clear\par EYES: sclera white, conjunctiva clear, no nystagmus\par NECK: supple\par CV: RRR 		\par PULM: CTAB, no wheezing\par GI: soft ABD, +BS, NT, ND\par EXT: peripheral pulse intact, no cyanosis\par MSK: muscle tone and strength normal\par SKIN: warm, dry, no rash or lesion on exposed skin \par \par NEUROLOGICAL EXAM:\par Mental Status\par Orientation: alert and oriented to person, place, time, and situation, 3/3 recall after 5 minutes\par Language: clear and fluent, intact comprehension and repetition, intact naming \par \par Cranial Nerves\par II: visual fields full to confrontation \par III, IV, VI: RHH\par V, VII: facial sensation and movement intact and symmetric \par VIII: hearing intact \par IX, X: uvula midline, soft palate elevates normally \par XI: BL shoulder shrug intact \par XII: tongue midline\par \par Motor\par Shoulder abd: 5 (R), 5 (L)\par EF/EE: 5 (R), 5 (L)\par WF/WE: 5 (R), 5 (L)\par hand : 5 (R), 5 (L)\par HF/HE: 5 (R), 5 (L)\par KF/KE: 5 (R), 5 (L)\par DF/PF: 5 (R), 5 (L) \par Tone and bulk are normal in upper and lower limbs\par No pronator drift\par \par Sensation\par Intact to light touch in in all 4 EXTs\par \par Reflex\par 2+ in BL biceps, brachioradialis, patella\par \par Coordination\par Normal FTN bilaterally\par Dysdiadochokinesia not present. \par Able to perform rapid, alternating movements\par \par Gait\par Normal stance, stride, and pivot turn\par Negative Romberg\par \par

## 2021-04-30 PROBLEM — H54.7 VISION LOSS: Status: ACTIVE | Noted: 2021-01-01

## 2021-04-30 NOTE — CONSULT LETTER
[Dear  ___] : Dear  [unfilled], [Courtesy Letter:] : I had the pleasure of seeing your patient, [unfilled], in my office today. [Please see my note below.] : Please see my note below. [Sincerely,] : Sincerely, [FreeTextEntry3] : Breanna Hunt NP\par [facility] [place]\par [address] [place] [address]\par Phone: [phone]; Fax: [phone]

## 2021-04-30 NOTE — REVIEW OF SYSTEMS
[Eyesight Problems] : eyesight problems [As noted in HPI] : as noted in HPI [Negative] : Genitourinary [Diarrhea] : diarrhea [Confused or Disoriented] : no confusion [Memory Lapses or Loss] : no memory loss [Decr. Concentrating Ability] : no decrease in concentrating ability [Difficulty with Language] : no ~M difficulty with language [Changed Thought Patterns] : no change in thought patterns [Facial Weakness] : no facial weakness [Arm Weakness] : no arm weakness [Hand Weakness] : no hand weakness [Leg Weakness] : no leg weakness [Poor Coordination] : good coordination [Numbness] : no numbness [Tingling] : no tingling [Seizures] : no convulsions [Dizziness] : no dizziness [Fainting] : no fainting [Lightheadedness] : no lightheadedness [Difficulty Walking] : no difficulty walking [Inability to Walk] : able to walk [Ataxia] : no ataxia

## 2021-04-30 NOTE — PATIENT PROFILE ADULT - DO YOU FEEL THREATENED BY OTHERS?
d/c giorgio 5/1, robles TOV, keep NT indwelling - to be evaluated by Dr Hernandez 5/1 for removal, possible discharge home 5/1 with or without NT, robles am labs, plan per Dr Hernandez
no

## 2021-04-30 NOTE — PHYSICAL EXAM
[FreeTextEntry1] : GENERAL PHYSICAL EXAM:\par GEN: no distress, normal affect\par HEENT: NCAT, OP clear\par EYES: sclera white, conjunctiva clear, no nystagmus\par NECK: supple\par CV: RRR 		\par PULM: CTAB, no wheezing\par GI: soft ABD, +BS, NT, ND\par EXT: peripheral pulse intact, no cyanosis\par MSK: muscle tone and strength normal\par SKIN: warm, dry, no rash or lesion on exposed skin \par \par NEUROLOGICAL EXAM:\par Mental Status\par Orientation: alert and oriented to person, place, time, and situation\par Language: clear and fluent, intact comprehension and repetition\par \par Cranial Nerves\par II: RHH\par III, IV, VI: EOMI\par V, VII: facial sensation and movement intact and symmetric \par VIII: hearing intact \par IX, X: uvula midline, soft palate elevates normally \par XI: BL shoulder shrug intact \par XII: tongue midline\par \par Motor\par Shoulder abd: 5 (R), 5 (L)\par EF/EE: 5 (R), 5 (L)\par WF/WE: 5 (R), 5 (L)\par hand : 5 (R), 5 (L)\par HF/HE: 5 (R), 5 (L)\par KF/KE: 5 (R), 5 (L)\par DF/PF: 5 (R), 5 (L) \par Tone and bulk are normal in upper and lower limbs\par No pronator drift\par \par Sensation\par Intact to light touch in in all 4 EXTs\par \par Reflex\par 2+ in BL biceps, brachioradialis, patella\par \par Coordination\par Normal FTN bilaterally\par Dysdiadochokinesia not present. \par Able to perform rapid, alternating movements\par \par Gait\par Normal stance, stride, and pivot turn\par Negative Romberg\par \par

## 2021-04-30 NOTE — HISTORY OF PRESENT ILLNESS
[FreeTextEntry1] : PCP: Dr. Brian ([place])\par Cardiology: Dr. Autumn Barton (phone: [phone], fax: [phone])\par Wife: Promise\par \par INTERIM HISTORY: Since his last visit he has met with a neuro opthamologist to evaluate chronic vision loss. He has a HUSEYIN with possible ILR placement scheduled with Dr. Barton on 5/17/21. He reports vision loss, occasional loss of balance and forgetfulness and diarrhea. He denies any other new TIA or stroke-like symptoms. \par \par INITIAL OFFICE VISIT 3/31/21: Entire interview conducted with [facility]  Jailene, ID# [id number]. Mr. Felix is a 65 year-old man with a history of CAD s/p single-vessel CABG LIMA to LAD 4/2010, ischemic dilated CM with CHF low EF s/p MDT single chamber ICD implant 1/22/21, HTN, DM, chronic CVA's in right frontal and left parietooccipital lobes on CTH (he was previously unaware that he had h/o CVA), vision impairment (legally blind) who presents for posthospital followup for after new diagnosis of epilepsy. CT brain showed chronic infarcts in two different vacular territories in bilateral hemispheres, concerning for embolic etiology. As per patient, he has an upcoming appointment with cardiologist, Dr. Autumn Barton. Patient is currently taking ASA 81mg QD, Brilinta and statin therapy. He reports chronic vision loss, which he has been experiencing for over a year. He denies any new TIA or stroke-like symptoms. \par \par Previous Work-Up: \par CT head 3/2021: Mild parenchymal volume loss is noted with prominent ventricles and sulci. Gliosis and encephalomalacia is again seen in the right frontal and left parieto-occipital lobes likely a sequela of prior infarction. An old lacunar infarct is noted in the right thalamus. No acute territorial infarct is demonstrated, unchanged from 1/2021.\par Bilateral carotid duplex (1/2021): No significant hemodynamic stenosis of either carotid artery.

## 2021-05-05 NOTE — PROGRESS NOTE ADULT - PROBLEM SELECTOR PROBLEM 2
-- DO NOT REPLY / DO NOT REPLY ALL --  -- Message is from the [facility]--    COVID-19 Universal Screening: Positive    Transfer to RN      Chief Complaint:  Patient is currently pregnant. Has runny nose, congestion, headaches, body aches    Caller Information       Type Contact Phone    05/05/2021 05:21 PM CDT Phone (Incoming) Ekta Esquivel (Self) [phone] (Z)          Provider Name:  Fernanda Gallego MD    [facility] Practice Site Name:  [place]    [place] Phone Number:  None     Acute on chronic combined systolic (congestive) and diastolic (congestive) heart failure

## 2021-05-15 NOTE — H&P PST ADULT - ASSESSMENT
64 yo male with PMHX of HTN, HLD, DM single vessel CABG LIMA to LAD, remote MI, severe LV dysfunction with EF 33% at [facility]. Patient had cardiac catheterization Jan 2021 which revealed 80% prox Circumflex lesion treated with ELIZ. Had HUSEYIN revealing moderate MR, had single chamber ICD for syncope. He had a emergency room visit 2/25/21 for seizure. EEG was abnormal now on Keppra. Patient had echocardiogram 12/2020 which showed EF < 25% with inferolateral akinesia moderate to severe global hypokinesia, mild MR. Underwent cardiac catheterization 1/2021 which showed Severe pulmonary HTN. ICS x 1 to LCx w/ 0% residual stenosis and CARLEEN III flow maintained pre and post PCI. IVUS of LCx. Patent LIMA to LAD. Severe LV dysfunction w/ moderate-severe MR. Patent stent, LIMA to LAD. He now presents for HUSEYIN possible Mitral Clip.       -pt has been NPO since MN  -labs, ekg ordered  -consent to be obtained by attending and anesthesia  -HUSEYIN order placed

## 2021-05-15 NOTE — H&P PST ADULT - HISTORY OF PRESENT ILLNESS
Narrative:     66 yo male with PMHX of HTN, HLD, DM single vessel CABG LIMA to LAD, remote MI, severe LV dysfunction with EF 33% at [facility]. Patient had cardiac catheterization Jan 2021 which revealed 80% prox Circumflex lesion treated with ELIZ. Had HUSEYIN revealing moderate MR, had single chamber ICD for syncope. He had a emergency room visit 2/25/21 for seizure. EEG was abnormal now on Keppra. Patient had echocardiogram 12/2020 which showed EF < 25% with inferolateral akinesia moderate to severe global hypokinesia, mild MR. Underwent cardiac catheterization 1/2021 which showed Severe pulmonary HTN. ICS x 1 to LCx w/ 0% residual stenosis and CARLEEN III flow maintained pre and post PCI. IVUS of LCx. Patent LIMA to LAD. Severe LV dysfunction w/ moderate-severe MR. Patent stent, LIMA to LAD. He now presents for HUSEYIN possible Mitral Clip.     ASA  Mallampati  GFR  Creat  Bleeding Risk  COVID-19      Symptoms:        Angina (Class):        Ischemic Symptoms:     Heart Failure:        Systolic/Diastolic/Combined:        NYHA Class (within 2 weeks):     Assessment of LVEF (Must be within 6 months):       EF:   30%        Assessed by:  Echocardiogram        Date:  12/2020     Prior Cardiac Interventions (LHC, stents, CABG):       PCI's (Date, Stents, Vessels):        CABG (Date, Grafts):  single vessel CABG LIMA to LAD      :   Associated Risk Factors:        Cerebrovascular Disease: N/A       Chronic Lung Disease: N/A       Peripheral Arterial Disease: N/A       Chronic Kidney Disease (if yes, what is GFR): N/A       Uncontrolled Diabetes (if yes, what is HgbA1C or FBS): N/A       Poorly Controlled Hypertension (if yes, what is SBP): N/A       Morbid Obesity (if yes, what is BMI): N/A       History of Recent Ventricular Arrhythmia: N/A       Inability to Ambulate Safely: N/A       Need for Therapeutic Anticoagulation: N/A       Antiplatelet or Contrast Allergy: N/A Narrative:     64 yo male with PMHX of HTN, HLD, DM single vessel CABG LIMA to LAD, remote MI, severe LV dysfunction with EF 33% at [facility]. Patient had cardiac catheterization Jan 2021 which revealed 80% prox Circumflex lesion treated with ELIZ. Had HUSEYIN revealing moderate MR, had single chamber ICD for syncope. He had a emergency room visit 2/25/21 for seizure. EEG was abnormal now on Keppra. Patient had echocardiogram 12/2020 which showed EF < 25% with inferolateral akinesia moderate to severe global hypokinesia, mild MR. Underwent cardiac catheterization 1/2021 which showed Severe pulmonary HTN. ICS x 1 to LCx w/ 0% residual stenosis and CARLEEN III flow maintained pre and post PCI. IVUS of LCx. Patent LIMA to LAD. Severe LV dysfunction w/ moderate-severe MR. Patent stent, LIMA to LAD. He now presents for HUSEYIN possible Mitral Clip.     ASA and Mallampati per anesthesia  COVID-19  negative    Assessment of LVEF (Must be within 6 months):       EF:   30%        Assessed by:  Echocardiogram        Date:  12/2020   < from: HUSEYIN Echo Doppler (01.05.21 @ 08:34) >  EXAM:  ECHO TRANSESOPHAGEAL    EXAM:  DOPPLER ECHO COMP W SPECTRAL    EXAM:  DOPPLER ECHOCARD COLOR FLOW      PROCEDURE DATE:  Jan 5 2021   .      INTERPRETATION:  REPORT:  TRANSESOPHAGEAL ECHOCARDIOGRAM REPORT        Patient Name:   SHASTA MCKINLEY Patient Location: Inpatient  Medical Rec #:  [id number]          Accession #:      [id number]  Account #:                         Height:           63.0 in 160.0 cm  YOB: 1955           Weight:           136.0 lb 61.69 kg  Patient Age:    65 years           BSA:              1.64 m²  Patient Gender: M                  BP:               122/74 mmHg      Date of Exam:        1/5/2021 8:34:35 AM  Sonographer:         Zaid Coles  Referring Physician: Roc    Procedure:   Transesophageal Echocardiogram.  Indications: Nonrheumatic mitral (valve) insufficiency - I34.0  Diagnosis:   Nonrheumatic mitral (valve) insufficiency - I34.0    SPECTRAL DOPPLER ANALYSIS (where applicable):  Mitral Insufficiency by PISA:  MR Volume:12.00 ml MR Flow Rate: 35.29 ml/s MR EROA: 8.06 mm²    Tricuspid Valve and PA/RV Systolic Pressure: TR Max Velocity: 3.10 m/s RA Pressure: 12 mmHg RVSP/PASP: 50.4 mmHg        PROCEDURE: After discussion of the risks and benefits of the HUSEYIN, an informed consent was obtained by the cardiologist. Intravenous sedation was performed by anesthesia. Local oropharyngeal anesthetic was provided with viscous lidocaine. The HUSEYIN probe was passed by the cardiologist without difficulty. Images were obtained with the patient in a left lateral decubitus position. The patient's vital signs; including heart rate, blood pressure, and oxygen saturation; remained stable throughout the procedure.    PHYSICIAN INTERPRETATION:  Left Ventricle: The left ventricular internal cavity size is severely increased. Left ventricular wall thickness is mildly increased.  Left ventricular ejection fraction, by visual estimation, is 20 to 25%. Spectral Doppler shows restrictive pattern of left ventricular myocardial filling (Grade III diastolic dysfunction). Elevated mean left atrial pressure.      LV Wall Scoring:  The RCA distribution, entire septum, basal anterior segment, and apex are  akinetic. The entire lateral wall is hypokinetic. All remaining scored  segments are normal.    Right Ventricle: The right ventricular size is moderately enlarged. RV systolic function is severely reduced.  Left Atrium: Severely enlarged left atrium. The left atrial appendage is enlarged, decreased left atrial appendage velocities and no left atrial appendage thrombus is seen. Severely en;larged LA with SEC in LA and LAAA w/o thrombus. Color flow doppler and intravenous injection of agitated saline demonstrates the presence of an intact intra atrial septum.  Right Atrium: Moderate to severe right atrial enlargement.  Pericardium: There is no evidence of pericardial effusion. There is a moderate pleural effusion in the left lateral region.  Mitral Valve: The mitral valve is normal in structure. Moderate mitral valve regurgitation is seen. Moderate MR by color flow, PISA ERO 0.2 cm2, no evidence of pul vein reversal.  Tricuspid Valve: The tricuspid valve is normal in structure. Moderate tricuspid regurgitation is visualized. Estimated pulmonary artery systolic pressure is50.4 mmHg assuming a right atrial pressure of 12 mmHg, which is consistent with moderate pulmonary hypertension.  Aortic Valve: The aortic valve is trileaflet.  Pulmonic Valve: The pulmonic valve is normal. Mild pulmonic valve regurgitation.  Aorta: The aortic root, ascending aorta, aortic arch and descending aorta are all structurally normal, with no evidence of dilitation or obstruction. Simple atheroma seen in the aortic arch and descending aorta.  Venous: All four pulmonary veins are normal.  Shunts: Agitated saline contrast was given intravenously to evaluate for intracardiac shunting. There is no evidence of a patent foramen ovale.      Summary:   1. Left ventricular ejection fraction, by visual estimation, is 20 to 25%.   2. Multiple left ventricular regional wall motion abnormalities exist. See wall motion findings.   3. Moderate to severe right atrial enlargement.   4. Severely enlarged left atrium.   5. Elevated mean left atrial pressure.   6. Mildly increased LV wall thickness.   7. Spectral Doppler shows restrictive pattern of left ventricular myocardial filling (Grade III diastolic dysfunction).   8. There is mild concentric left ventricular hypertrophy.   9. Moderately enlarged right ventricle.  10. Severely reduced RV systolic function.  11. Severely en;larged LA with SEC in LA and LAAA w/o thrombus.  12. Moderate pleural effusion in the left lateral region.  13. Moderate mitral valve regurgitation.  14. Moderate MR by color flow, PISA ERO 0.2 cm2, no evidence of pul vein reversal.  15. Moderate tricuspid regurgitation.  16. Mild pulmonic valve regurgitation.  17. Estimated pulmonary artery systolic pressure is 50.4 mmHg assuming a right atrial pressure of 12 mmHg, which is consistent with moderate pulmonary hypertension.  18. Color flow doppler and intravenous injection of agitated saline demonstrates the presence of an intact intra atrial septum.        Prior Cardiac Interventions (LHC, stents, CABG):       PCI's (Date, Stents, Vessels):        CABG (Date, Grafts):  single vessel CABG LIMA to LAD      :

## 2021-05-17 NOTE — PROGRESS NOTE ADULT - SUBJECTIVE AND OBJECTIVE BOX
Department of Cardiology                                                                  [facility]/[place] [address] [address] [facility]                                                            Telephone: [phone]. Fax:[phone]                                                                         Post-Procedure Note: HUSEYIN      66 yo male with PMHX of HTN, HLD, DM single vessel CABG LIMA to LAD, remote MI, severe LV dysfunction with EF 33% at [facility]. Patient had cardiac catheterization Jan 2021 which revealed 80% prox Circumflex lesion treated with ELIZ. Had HUSEYIN revealing moderate MR, had single chamber ICD for syncope. He had a emergency room visit 2/25/21 for seizure. EEG was abnormal now on Keppra. Patient had echocardiogram 12/2020 which showed EF < 25% with inferolateral akinesia moderate to severe global hypokinesia, mild MR. Underwent cardiac catheterization 1/2021 which showed Severe pulmonary HTN. ICS x 1 to LCx w/ 0% residual stenosis and CARLEEN III flow maintained pre and post PCI. IVUS of LCx. Patent LIMA to LAD. Severe LV dysfunction w/ moderate-severe MR. Patent stent, LIMA to LAD. He now presents for HUSEYIN.     No Cardiac source of emboli  Tolerated procedure well  Plan for ILR    T(C): 36.6 (05-17-21 @ 08:09), Max: 36.6 (05-17-21 @ 08:09)  HR: 72 (05-17-21 @ 08:09) (72 - 72)  BP: 115/64 (05-17-21 @ 08:09) (115/64 - 115/64)  RR: 18 (05-17-21 @ 08:09) (18 - 18)  SpO2: 99% (05-17-21 @ 08:09) (99% - 99%)  Wt(kg): --    I&O's Summary      Daily Height in cm: 160.02 (17 May 2021 08:09)    Daily     Constitutional: Sleepy but arousable  Cardiovascular: Normal S1 S2  Respiratory: Lungs clear to auscultation	  Gastrointestinal:  Soft, Non-tender, + BS	  Extremities: No clubbing, cyanosis or edema  Vascular: Peripheral pulses palpable 2+ bilaterally    TELEMETRY: SR	        DIAGNOSTIC TESTING:  [x ] Echocardiogram: < from: HUSEYIN Echo Doppler (05.17.21 @ 09:13) >  EXAM:  ECHO TRANSESOPHAGEAL    EXAM:  DOPPLER ECHO COMP W SPECTRAL    EXAM:  DOPPLER ECHOCARD COLOR FLOW      PROCEDURE DATE:  May 17 2021   .      INTERPRETATION:  REPORT:  TRANSESOPHAGEAL ECHOCARDIOGRAM REPORT        Patient Name:   SHASTA MCKINLEY Patient Location: [facility] Rec #:  [id number]               Accession #:      [id number]  Account #:                              Height:           63.0 in 160.0 cm  YOB: 1955                Weight:           133.0 lb60.33 kg  Patient Age:    65 years                BSA:              1.63 m²  Patient Gender: M                       BP:               172/83 mmHg      Date of Exam:        5/17/2021 9:13:22 AM  Sonographer:         Faustino Hankins  Referring Physician: BANDAR  Copy report sent to: Roc    Procedure:   Transesophageal Echocardiogram.  Indications: Cerebral infarction, unspecified - I63.9  Diagnosis:   Cerebral infarction, unspecified - I63.9    SPECTRAL DOPPLER ANALYSIS (where applicable):  Tricuspid Valve and PA/RV Systolic Pressure: TR Max Velocity: 3.28 m/s RA Pressure: 10 mmHg RVSP/PASP: 53.0 mmHg        PROCEDURE: After discussion of the risks and benefits of the HUSEYIN, an informed consent was obtained by the cardiologist. Intravenoussedation was performed by anesthesia. Local oropharyngeal anesthetic was provided with viscous lidocaine. The HUSEYIN probe was passed by the cardiologist without difficulty. Images were obtained with the patient in a left lateral decubitus position. Thepatient's vital signs; including heart rate, blood pressure, and oxygen saturation; remained stable throughout the procedure.    PHYSICIAN INTERPRETATION:  Left Ventricle: The left ventricular internal cavity size is moderate to severely increased. Left ventricular wall thickness is moderately increased.  Left ventricular ejection fraction, by visual estimation, is 20 to 25%. Spectral Doppler shows restrictive pattern of left ventricular myocardial filling (Grade III diastolic dysfunction). Elevated mean left atrial pressure. Findings are consistent with ischemic cardiomyopathy. Severe global hypokinesia with anteroseptal akinesia, est EF 20-25%.  Right Ventricle: The right ventricular size is mildly enlarged. RV systolic function is moderately reduced.  Left Atrium: Mild to moderately enlarged left atrium. No left atrial appendage thrombus is seen, prominent left atrial appendage and normal left atrial appendage velocities. SEC seen in LA and JALEEL without thrombus, confirmed by 3D imaging. Color flow doppler and intravenous injection of agitated saline demonstrates the presence of an intact intra atrial septum.  Pericardium: There is no evidence of pericardial effusion.  Mitral Valve: The mitral valve is normal in structure. Mitral leaflet mobility is normal. Mild mitral valve regurgitation is seen.  Tricuspid Valve: The tricuspid valve is normal in structure. Moderate tricuspid regurgitation is visualized. Estimated pulmonary artery systolic pressure is 53.0 mmHg assuming a rightatrial pressure of 10 mmHg, which is consistent with moderate pulmonary hypertension.  Aortic Valve: The aortic valve is trileaflet. Trivial aortic valve regurgitation is seen.  Pulmonic Valve: Structurally normal pulmonic valve, with normal leaflet excursion. Trace pulmonic valve regurgitation.  Aorta: The aortic root, ascending aorta, aortic arch and descending aorta are all structurally normal, with no evidence of dilitation or obstruction. Simple atheroma seen in the aortic arch and descending aorta.  Venous: All four pulmonary veins are normal.  Shunts: Agitated saline contrast was given intravenously to evaluate for intracardiac shunting. There is no evidence of a patent foramen ovale.      Summary:   1. Left ventricular ejection fraction, by visual estimation, is 20 to 25%.   2. Severe global hypokinesia with anteroseptal akinesia, est EF 20-25%.   3. Ischemic cardiomyopathy.   4. Moderately increased LV wall thickness.   5. There is moderate concentric left ventricular hypertrophy.   6. Spectral Doppler shows restrictive pattern of left ventricular myocardial filling (Grade III diastolic dysfunction).   7. Moderately reduced RV systolic function.   8. Elevated mean left atrial pressure.   9. Mildly enlarged right ventricle.  10. SEC seen in LA and JALEEL without thrombus, confirmed by 3D imaging.  11. Mild to moderately enlarged left atrium.  12. Mild mitral valve regurgitation.  13. Moderate tricuspid regurgitation.  14. Estimated pulmonary artery systolic pressure is 53.0 mmHg assuming a right atrial pressure of 10 mmHg, which is consistent with moderate pulmonary hypertension.  15. Color flow doppler and intravenous injection of agitated saline demonstrates the presence of an intact intra atrial septum.  16. No cardiacsource of emboli detected.    MD Naren Electronically signed on 5/17/2021 at 9:50:10 AM        ASSESSMENT: 66 yo male with PMHX of HTN, HLD, DM single vessel CABG LIMA to LAD, remote MI, severe LV dysfunction with EF 33% at [facility]. Patient had cardiac catheterization Jan 2021 which revealed 80% prox Circumflex lesion treated with ELIZ. Had HUSEYIN revealing moderate MR, had single chamber ICD for syncope. He had a emergency room visit 2/25/21 for seizure. EEG was abnormal now on Keppra. Patient had echocardiogram 12/2020 which showed EF < 25% with inferolateral akinesia moderate to severe global hypokinesia, mild MR. Underwent cardiac catheterization 1/2021 which showed Severe pulmonary HTN. ICS x 1 to LCx w/ 0% residual stenosis and CARLEEN III flow maintained pre and post PCI. IVUS of LCx. Patent LIMA to LAD. Severe LV dysfunction w/ moderate-severe MR. Patent stent, LIMA to LAD. He now presents for HUSEYIN.     No Cardiac source of emboli  Tolerated procedure well  Plan for ILR      -Post HUSEYIN orders  -DC home after ILR

## 2021-05-17 NOTE — DISCHARGE NOTE PROVIDER - NSDCMRMEDTOKEN_GEN_ALL_CORE_FT
aspirin 81 mg oral delayed release tablet: 1 tab(s) orally once a day  atorvastatin 40 mg oral tablet: 1 tab(s) orally once a day  carvedilol 3.125 mg oral tablet: 1 tab(s) orally every 12 hours  gabapentin 100 mg oral capsule: 1 cap(s) orally 3 times a day  Keppra 750 mg oral tablet: 1 tab(s) orally 2 times a day   Lantus 100 units/mL subcutaneous solution: 20 unit(s) subcutaneous once a day (at bedtime)  Lasix 40 mg oral tablet: 1 tab(s) orally 2 times a day  NovoLOG 100 units/mL subcutaneous solution: 15 unit(s) subcutaneous  sacubitril-valsartan 24 mg-26 mg oral tablet: 1 tab(s) orally 2 times a day  ticagrelor 90 mg oral tablet: 1 tab(s) orally every 12 hours to start 1/5/21

## 2021-05-17 NOTE — DISCHARGE NOTE PROVIDER - NSDCCPTREATMENT_GEN_ALL_CORE_FT
PRINCIPAL PROCEDURE  Procedure: Transesophageal echocardiogram  Findings and Treatment: No cardiac source of emboli       PRINCIPAL PROCEDURE  Procedure: Transesophageal echocardiogram  Findings and Treatment: No cardiac source of emboli      SECONDARY PROCEDURE  Procedure: Insertion of loop recorder  Findings and Treatment: Loop Recorder Incision Care:     - Do not touch the incision until it is completely healed.   - There is Dermabond (skin glue), steri strips and a dressing on your incision,   - Please remove top dressing tomorrow 5/18/21  - Steri strips and dermabond will start to flake off on their  own over the next 2-3 weeks. Do not pick at or peel off the Dermabond.   - Do not apply soaps, creams, lotions, ointments or powders to the incision until it is completely healed.  - You should call the doctor if you notice redness, drainage, swelling, increased tenderness, hot sensation around the  incision, bleeding or incision edges pulling apart.

## 2021-05-17 NOTE — DISCHARGE NOTE NURSING/CASE MANAGEMENT/SOCIAL WORK - PATIENT PORTAL LINK FT
You can access the FollowMyHealth Patient Portal offered by [facility] by registering at the following website: [email or web address]. By joining [facility]’s FollowMyHealth portal, you will also be able to view your health information using other applications (apps) compatible with our system.

## 2021-05-17 NOTE — DISCHARGE NOTE PROVIDER - CARE PROVIDER_API CALL
Autumn Garcia)  Internal Medicine  [address] [place] [id number]  Phone: [phone]  Fax: [phone]  Follow Up Time:    Autumn Garcia)  Internal Medicine  [address] [place], [place] [id number]  Phone: [phone]  Fax: [phone]  Follow Up Time:     Luisito Leal)  Cardiac Electrophysiology; Cardiovascular Disease  [address] [place] [address]  Phone: [phone]  Fax: [phone]  Follow Up Time:

## 2021-05-17 NOTE — DISCHARGE NOTE PROVIDER - HOSPITAL COURSE
64 yo male with PMHX of HTN, HLD, DM single vessel CABG LIMA to LAD, remote MI, severe LV dysfunction with EF 33% at [facility]. Patient had cardiac catheterization Jan 2021 which revealed 80% prox Circumflex lesion treated with ELIZ. Had HUSEYIN revealing moderate MR, had single chamber ICD for syncope. He had a emergency room visit 2/25/21 for seizure. EEG was abnormal now on Keppra. Patient had echocardiogram 12/2020 which showed EF < 25% with inferolateral akinesia moderate to severe global hypokinesia, mild MR. Underwent cardiac catheterization 1/2021 which showed Severe pulmonary HTN. ICS x 1 to LCx w/ 0% residual stenosis and CARLEEN III flow maintained pre and post PCI. IVUS of LCx. Patent LIMA to LAD. Severe LV dysfunction w/ moderate-severe MR. Patent stent, LIMA to LAD. He now presents for HUSEYIN.     No Cardiac source of emboli  Tolerated procedure well  ILR implanted.

## 2021-05-17 NOTE — DISCHARGE NOTE PROVIDER - NSDCCPCAREPLAN_GEN_ALL_CORE_FT
PRINCIPAL DISCHARGE DIAGNOSIS  Diagnosis: Ischemic cardiomyopathy  Assessment and Plan of Treatment:

## 2021-05-17 NOTE — DISCHARGE NOTE PROVIDER - PROVIDER TOKENS
PROVIDER:[TOKEN:[[id number]]] PROVIDER:[TOKEN:[[id number]]],PROVIDER:[TOKEN:[[id number]]]

## 2021-05-17 NOTE — PROGRESS NOTE ADULT - ASSESSMENT
Assessment  Ischemic DCM with severe LV dysfunction compensated on medical therapy  Mild functional MR  remote CVA/TIA  VVI ICD  DM  No cardiac source of emboli detetced on HUSEYIN      Rec  Cont GDMT for CM  Cont antiplatelet therapy  implantable loop today to exclude PAF  FU in office 1 month

## 2021-05-17 NOTE — PROGRESS NOTE ADULT - SUBJECTIVE AND OBJECTIVE BOX
[facility] - [facility]                                   [address] [place] [address]                                                      PHONE: [phone]                                                         FAX: [phone]  [email or web address]. [email or web address]  ---------------------------------------------------------------------------------------------------------------------------------    Overnight events/patient complaints: here for HUSEYIN to exclude CSE      Toprol-XL (Faint; Other)    MEDICATIONS  (STANDING):  chlorhexidine 4% Liquid 1 Application(s) Topical Once    MEDICATIONS  (PRN):      Vital Signs Last 24 Hrs  T(C): 36.6 (17 May 2021 08:09), Max: 36.6 (17 May 2021 08:09)  T(F): 97.9 (17 May 2021 08:09), Max: 97.9 (17 May 2021 08:09)  HR: 72 (17 May 2021 08:09) (72 - 72)  BP: 115/64 (17 May 2021 08:09) (115/64 - 115/64)  BP(mean): --  RR: 18 (17 May 2021 08:09) (18 - 18)  SpO2: 99% (17 May 2021 08:09) (99% - 99%)  Daily Height in cm: 160.02 (17 May 2021 08:09)    Daily   ICU Vital Signs Last 24 Hrs  SHASTA MCKINLEY  I&O's Detail    I&O's Summary    Drug Dosing Weight  SHASTA MCKINLEY      PHYSICAL EXAM:  General: Appears well developed, well nourished alert and cooperative.  HEENT: Head; normocephalic, atraumatic.  Eyes: Pupils reactive, cornea wnl.  Neck: Supple, no nodes adenopathy, no NVD or carotid bruit or thyromegaly.  CARDIOVASCULAR: Normal S1 and S2, No murmur, rub, gallop or lift.   LUNGS: No rales, rhonchi or wheeze. Normal breath sounds bilaterally.  ABDOMEN: Soft, nontender without mass or organomegaly. bowel sounds normoactive.  EXTREMITIES: No clubbing, cyanosis or edema. Distal pulses wnl.   SKIN: warm and dry with normal turgor.  NEURO: Alert/oriented x 3/normal motor exam. No pathologic reflexes.    PSYCH: normal affect.        LABS:                        11.9   7.66  )-----------( 156      ( 17 May 2021 08:15 )             36.2     05-17    140  |  101  |  34.0<H>  ----------------------------<  166<H>  3.7   |  32.0<H>  |  1.26    Ca    8.7      17 May 2021 08:15      SHASTA MCKINLEY      PT/INR - ( 17 May 2021 08:15 )   PT: 13.6 sec;   INR: 1.18 ratio         PTT - ( 17 May 2021 08:15 )  PTT:32.2 sec      RADIOLOGY & ADDITIONAL STUDIES:    INTERPRETATION OF TELEMETRY (personally reviewed):    HUSEYIN performed with anesthesia standby, pt tolerated well  Results: Mod conc LVH with global hypokinesia, anteroseptal akinesia est EF 20-25%  Mild functional MR  Mod LAE with SEC in LA and JALEEL w/o thrombus'  No PFO  mod TR woth mod pul HTN  pacer lead in RV apex  No cardiac source of emboli detected

## 2021-06-11 NOTE — HISTORY OF PRESENT ILLNESS
[FreeTextEntry1] : LAST OFFICE VISIT: 3/24/21\par \par PCP: Dr. Brian\par Wife: Promise\par \par INTERIM HISTORY:\par Pt today is accompanied by wife. 48hr aEEG showed 1-2 Hz R FT LRDA. Doing well.\par \par CURRENT AEDs:\par LEV 750mg BID - started 2/2021, level 21.1 on 3/24\par gabapentin 100mg TID - for neuropathy\par \par \par PRIOR EPILEPSY HISTORY:\par 3/24/21: This is a 65 year-old ambidextrous male with a history of CAD s/p single-vessel CABG LIMA to LAD 4/2010, ischemic dilated CM with CHF, s/p MDT single chamber ICD implant 1/22/21, HTN, DM, chronic CVA's in right frontal and left parietooccipital lobes on CTH (pt not aware he has any h/o CVA), right vision impairment (legally blind) who presents for posthospital followup for newly diagnosed epilepsy presenting as self-limiting CSE.\par \par Patient presented to [facility] ER on 2/25 with new onset self-limiting CSE. Patient was on the couch at home, when he suddenly lost consciousness without any aura and proceeded to have a 10m full body convulsion with tongue bite. Spontaneously resolved. CTH showed old infarcts in bilateral hemispheres, but pt states he is not aware he has ever had a stroke. Routine EEG in ER with right frontal sharp wave discharges. He was discharged with LEV 750mg BID. Tolerating it well. No further sz.\par \par Because of chronic infarcts in two different vascular territories in BL hemispheres, concerning for embolic etiology. Recently had ICD implanted. No documented history of afib. He was instructed to followup with his cardiologist, Dr. Autumn Batron, to eval for afib. Pt never did. Already on ASA 81mg daily, statin and Brilinta. 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 Patient was a product of normal pregnancy and delivery. No history of febrile seizure, TBI, CNS infection, or FH of seizures.\par \par PREVIOUS AED:\par none\par \par IMAGING: \par CTH 2/25/21 ([facility]): Gliosis and encephalomalacia is again seen in the right frontal and left parieto-occipital lobes likely a sequela of prior infarction. An old lacunar infarct is noted in the right thalamus. \par \par NEUROPHYSIOLOGY:\par 48hr aEEG 4/1 – 4/3/21 ([facility]): 1) 1-2 Hz R FT LRDA, 2) intermittent slowing R FT\par \par rEEG 2/25/21 ([facility]): 1) RF sharps; 2) intermittent and independent slowing in the bilateral hemispheres \par \par NEUROPSYCHOLOGY: \par rEEG 2/25/21 ([facility]): 1) RF sharps, 2) intermittent, independent bihemispheric slowing\par \par NEUROPSYCHOLOGY: \par none

## 2021-06-11 NOTE — ASSESSMENT
[FreeTextEntry1] : SHASTA MCKINLEY is a 65 year-old ambidextrous male with a history of CAD s/p single-vessel CABG LIMA to LAD 4/2010, ischemic dilated CM with CHF, s/p MDT single chamber ICD implant 1/22/21 (unknown if MRI compatible), HTN, DM, chronic CVA's in right frontal and left parietooccipital lobes on CTH (pt not aware he has any h/o CVA), right vision impairment (legally blind) who presents for followup for focal epilepsy that presented as self-limiting CSE. EEG with RF sharps. Personally reviewed all images, labs and other studies. \par \par Doing well on LEv monotherapy. All questions and concerns answered and addressed in detail to patient's and wife’s complete satisfaction. Patient and wife verbalized understanding and agreed to plan.\par \par \par - continue LEV 750mg BID\par - continue ASA 81mg daily, Brilinta and statin\par - does not drive \par - f/u in 4 months\par \par \par All relevant epilepsy AAN quality care measures were addressed and discussed with the patient and wife.\par \par More than 50% of time spent counseling and educating patient and wife about epilepsy specific safety issues including AED side effects and interactions, alcohol consumption, sleep deprivation, risks and driving privileges associated with the [place] Guidelines, death related to seizures/SUDEP, seizure 1st aid and risks. Patient and wife are educated on seizure precautions, including no driving, no operating machinery, no swimming or bathing, no climbing heights, or engage in any risky activities during which a seizure could cause further injury to pt or others.

## 2021-06-11 NOTE — CONSULT LETTER
[Sincerely,] : Sincerely, [Dear  ___] : Dear  [unfilled], [FreeTextEntry1] : I had the pleasure of seeing our mutual patient, SHASTA MCKINLEY, in my office today. Please see my note below. He has chronic cerebral infarcts in different vascular territories, concerning for embolic source. Please interrogate his ICD for arrhythmia. \par \par If you have any questions, do not hesitate to contact me.\par  [FreeTextEntry3] : Perla Celis MD\par Director, Epilepsy/EMU\par [facility] \par [facility] [place]\par [address] [place] [address] \par Tel: [phone]; Fax: [phone]\par \par \par

## 2021-06-15 NOTE — ED STATDOCS - CLINICAL SUMMARY MEDICAL DECISION MAKING FREE TEXT BOX
PT with blister now open wound has mild surrounding erythema no purulent drainage, does no appear to be nec. fac. or pseudomonal infection has healing granulation tissue, labs x-ray sono, IV cephazolin, likely d/c PT with blister now open wound from wrapping foot likely to tightly last week. has mild surrounding erythema no purulent drainage, does no appear to be nec. fasc. or pseudomonal infection has healing granulation tissue, labs x-ray sono, IV cephazolin, likely d/c

## 2021-06-15 NOTE — ED STATDOCS - PROGRESS NOTE DETAILS
patient seen by attending for foot wound to dorsal foot.  no bleeding, no erythema, no warmth, no discharge.    will follow up results and dispo xeroform applied to foot

## 2021-06-15 NOTE — ED STATDOCS - PHYSICAL EXAMINATION
Gen: NAD, AOx3  Head: NCAT  HEENT: EOMI, oral mucosa moist, normal conjunctiva, neck supple  Lung: no respiratory distress  CV:  Normal perfusion  MSK: see skin  Neuro: No focal neurologic deficits  Skin: left leg +1 pitting edema, 8x6cm wound on dorsum non tender mild surrounding erythema, normal cap refill, hemorrhagic blister on dorsum of foot, non tender no edema,  Psych: normal affect Gen: NAD, AOx3  Head: NCAT  HEENT: EOMI, oral mucosa moist, normal conjunctiva, neck supple  Lung: no respiratory distress  CV:  Normal perfusion  MSK: left leg +1 pitting edema,  Neuro: No focal neurologic deficits  Skin:  8x6cm wound on dorsum non tender mild surrounding erythema, normal cap refill, hemorrhagic blister on dorsum of foot, non tender no edema,  Psych: normal affect

## 2021-06-15 NOTE — ED STATDOCS - OBJECTIVE STATEMENT
64 y/o male with PMHx of CAD, DM, HTN, diabetic neuropathy. c/o wound to left foot. Pt states he has had the sores on his left foot for a week. Pt had blisters on his foot after he wrapped it due to his diabetic neuropathy. The blisters the opened up causing ulcerations. Pt had put on antibiotic ointment, has not used it in the past 2 days. Pt denies fever. Pt denies drainage 64 y/o male with PMHx of CAD, DM, HTN, diabetic neuropathy. c/o wound to left foot. Pt states he has had the sores on his left foot for a week. Pt had blisters on his foot after he wrapped it due to his diabetic neuropathy. The blisters the opened up causing ulcerations. Pt had put on antibiotic ointment, has not used it in the past 2 days. Pt denies fever. Pt denies drainage. just wanted the wound checked because it isn't better. not spreading. pain mild.

## 2021-06-15 NOTE — ED STATDOCS - NS ED ROS FT
ROS: no CP/SOB. no cough. no fever. no n/v/d/c. no abd pain. no rash. + open wound left foot, + blister left foot, no bleeding. no urinary complaints. no weakness. no vision changes. no HA. no neck/back pain. + LLE extremity swelling, no deformity. No change in mental status.

## 2021-06-15 NOTE — ED STATDOCS - ATTENDING CONTRIBUTION TO CARE
I, Soniya Allan, performed the initial face to face bedside interview with this patient regarding history of present illness, review of symptoms and relevant past medical, social and family history.  I completed an independent physical examination.   The medical decision making and follow-up on ordered tests (ie labs, radiologic studies) and re-evaluation of the patient's status has been communicated to the ACP.  Disposition of the patient will be based on test outcome and response to ED interventions.  The history, relevant review of systems, past medical and surgical history, medical decision making, and physical examination was documented by the scribe in my presence and I attest to the accuracy of the documentation.

## 2021-06-15 NOTE — ED STATDOCS - PATIENT PORTAL LINK FT
You can access the FollowMyHealth Patient Portal offered by [facility] by registering at the following website: [email or web address]. By joining [facility]’s FollowMyHealth portal, you will also be able to view your health information using other applications (apps) compatible with our system.

## 2021-08-26 NOTE — ASSESSMENT
[FreeTextEntry1] : SHASTA MCKINLEY is a 66 year-old ambidextrous male with a history of CAD s/p single-vessel CABG LIMA to LAD 4/2010, ischemic dilated CM with CHF, s/p MDT single chamber ICD implant 1/22/21 (unknown if MRI compatible), HTN, DM, chronic CVA's in right frontal and left parietooccipital lobes on CTH (pt not aware he has any h/o CVA), right vision impairment (legally blind) who presents for followup for focal epilepsy that presented as self-limiting CSE. EEG with RF sharps. Personally reviewed all images, labs, EEG and other studies. \par \par Doing well on LEV monotherapy. All questions and concerns answered and addressed in detail to patient's and wife’s complete satisfaction. Patient and wife verbalized understanding and agreed to plan.\par \par \par - continue LEV 750mg BID\par - continue ASA 81mg daily, Brilinta and statin\par - does not drive \par - f/u in 5 months\par \par \par All relevant epilepsy AAN quality care measures were addressed and discussed with the patient and wife.\par \par More than 50% of time spent counseling and educating patient and wife about epilepsy specific safety issues including AED side effects and interactions, alcohol consumption, sleep deprivation, risks and driving privileges associated with the [place] Guidelines, death related to seizures/SUDEP, seizure 1st aid and risks. Patient and wife are educated on seizure precautions, including no driving, no operating machinery, no swimming or bathing, no climbing heights, or engage in any risky activities during which a seizure could cause further injury to pt or others.

## 2021-11-29 PROBLEM — E11.42 TYPE 2 DIABETES MELLITUS WITH DIABETIC POLYNEUROPATHY, WITH LONG-TERM CURRENT USE OF INSULIN: Status: ACTIVE | Noted: 2020-05-20

## 2021-11-29 PROBLEM — I63.9 CARDIOEMBOLIC STROKE: Status: ACTIVE | Noted: 2021-01-01

## 2021-11-29 NOTE — DISCUSSION/SUMMARY
[Antithrombotic therapy with ___] : antithrombotic therapy with  [unfilled] [Intensive Blood Pressure Control] : intensive blood pressure control [Lipid Lowering Therapy] : lipid lowering therapy [Patient encouraged to discuss with Primary MD] : I encouraged the patient to discuss these important issues with ~his/her~ primary care doctor [Goals and Counseling] : I have reviewed the goals of stroke risk factor modification. I counseled the patient on measures to reduce stroke risk, including the importance of medication compliance, risk factor control, exercise, healthy diet and avoidance of smoking. I reviewed stroke warning signs and symptoms and appropriate actions to take if such occur. [FreeTextEntry1] : Mr. Felix is a 66 year-old man with chronic infarcts in two different vacular territories in bilateral hemispheres, concerning for embolic etiology. He had a cardiac work-up done with Dr. Barton. Records not currently available but as per patient, no arrhythmias have been detected on ILR. Continue Brilinta and ASA as prescribed. Will reach out to Dr. Barton to assess patient's need for continue DAPT. Follow-up with PCP for blood glucose, pressure and cholesterol management.  Follow-up with me in six months or sooner should the need arise. All of their questions and concerns were addressed.

## 2021-11-29 NOTE — PHYSICAL EXAM
[FreeTextEntry1] : GENERAL PHYSICAL EXAM:\par GEN: no distress, normal affect\par HEENT: NCAT, OP clear\par EYES: sclera white, conjunctiva clear, no nystagmus\par NECK: supple\par CV: RRR 		\par PULM: CTAB, no wheezing\par GI: soft ABD, +BS, NT, ND\par EXT: peripheral pulse intact, no cyanosis\par MSK: muscle tone and strength normal\par SKIN: warm, dry, no rash or lesion on exposed skin \par \par NEUROLOGICAL EXAM:\par Mental Status\par Orientation: alert and oriented to person, place, time, and situation\par Language: clear and fluent, intact comprehension and repetition\par \par Cranial Nerves\par II: RHH\par III, IV, VI: EOMI\par V, VII: facial sensation and movement intact and symmetric \par VIII: hearing intact \par IX, X: uvula midline, soft palate elevates normally \par XI: BL shoulder shrug intact \par XII: tongue midline\par \par Motor\par Shoulder abd: 5 (R), 5 (L)\par EF/EE: 5 (R), 5 (L)\par hand : 5 (R), 5 (L)\par HF/HE: 5 (R), 5 (L)\par KF/KE: 5 (R), 5 (L)\par DF/PF: 5 (R), 5 (L) \par Tone and bulk are normal in upper and lower limbs\par No pronator drift\par \par Sensation\par Intact to light touch in in all 4 EXTs\par \par Reflex\par 2+ in BL biceps, brachioradialis, patella\par \par Coordination\par Normal FTN bilaterally\par Dysdiadochokinesia not present. \par Able to perform rapid, alternating movements\par \par Gait\par Normal stance, stride, and pivot turn\par Negative Romberg\par \par

## 2021-11-29 NOTE — HISTORY OF PRESENT ILLNESS
[FreeTextEntry1] : PCP: Dr. Brian ([place])\par Cardiology: Dr. Autumn Barton (phone: [phone], fax: [phone])\par Wife: Promise\par \par INTERIM HISTORY: Since his last visit, he has been doing well. He has an appointment with opthalmology for scar tissue removal in the right eye and possibly blepharoplasty. He reports compliance with all medications. He denies any seizures or seizure-like activity. He denies any new TIA or stroke-like symptoms. \par \par INITIAL OFFICE VISIT 3/31/21: Entire interview conducted with Warren  Jailene, ID# [id number]. Mr. Felix is a 65 year-old man with a history of CAD s/p single-vessel CABG LIMA to LAD 4/2010, ischemic dilated CM with CHF low EF s/p MDT single chamber ICD implant 1/22/21, HTN, DM, chronic CVA's in right frontal and left parietooccipital lobes on CTH (he was previously unaware that he had h/o CVA), vision impairment (legally blind) who presents for posthospital followup for after new diagnosis of epilepsy. CT brain showed chronic infarcts in two different vacular territories in bilateral hemispheres, concerning for embolic etiology. As per patient, he has an upcoming appointment with cardiologist, Dr. Autumn Barton. Patient is currently taking ASA 81mg QD, Brilinta and statin therapy. He reports chronic vision loss, which he has been experiencing for over a year. He denies any new TIA or stroke-like symptoms. \par \par Previous Work-Up: \par CT head 3/2021: Mild parenchymal volume loss is noted with prominent ventricles and sulci. Gliosis and encephalomalacia is again seen in the right frontal and left parieto-occipital lobes likely a sequela of prior infarction. An old lacunar infarct is noted in the right thalamus. No acute territorial infarct is demonstrated, unchanged from 1/2021.\par Bilateral carotid duplex (1/2021): No significant hemodynamic stenosis of either carotid artery.\par \par 4/30/21: Since his last visit he has met with a neuro opthamologist to evaluate chronic vision loss. He has a HUSEYIN with possible ILR placement scheduled with Dr. Barton on 5/17/21. He reports vision loss, occasional loss of balance and forgetfulness and diarrhea. He denies any other new TIA or stroke-like symptoms.

## 2021-11-29 NOTE — REVIEW OF SYSTEMS
[Eyesight Problems] : eyesight problems [As noted in HPI] : as noted in HPI [Diarrhea] : diarrhea [Negative] : Genitourinary [Confused or Disoriented] : no confusion [Memory Lapses or Loss] : no memory loss [Decr. Concentrating Ability] : no decrease in concentrating ability [Difficulty with Language] : no ~M difficulty with language [Changed Thought Patterns] : no change in thought patterns [Facial Weakness] : no facial weakness [Arm Weakness] : no arm weakness [Hand Weakness] : no hand weakness [Leg Weakness] : no leg weakness [Poor Coordination] : good coordination [Numbness] : no numbness [Tingling] : no tingling [Seizures] : no convulsions [Dizziness] : no dizziness [Fainting] : no fainting [Lightheadedness] : no lightheadedness [Difficulty Walking] : no difficulty walking [Inability to Walk] : able to walk [Ataxia] : no ataxia

## 2021-11-29 NOTE — CONSULT LETTER
[Dear  ___] : Dear  [unfilled], [Courtesy Letter:] : I had the pleasure of seeing your patient, [unfilled], in my office today. [Please see my note below.] : Please see my note below. [Sincerely,] : Sincerely, [DrJuanita  ___] : Dr. SYLVESTER [FreeTextEntry3] : Breanna Hunt NP\par [facility] [place]\par [address] [place] [address]\par Phone: [phone]; Fax: [phone]

## 2021-12-16 NOTE — ED PROVIDER NOTE - CLINICAL SUMMARY MEDICAL DECISION MAKING FREE TEXT BOX
66yM with htn, CAD s/p stent x1, CHF, MI, DM presenting with 2 days of nausea, vomiting, and diarrhea. Patient abdominal exam non-focal and without signs or peritonitis. Likely viral gastroenteritis. Given significant cardiac history will obtain tropx1 and EKG in addition to lytes, swab, and saline bolus, zofran

## 2021-12-16 NOTE — H&P ADULT - ATTENDING COMMENTS
66M w/ PMHx DM, HTN, CAD s/p PCI, CHF s/p ICD admitted w/ N/V and profuse diarrhea of 2 days duration. He has been off insulin for the past 2 days as well. CT w/ pancolitis and also found to be parainfluenza +ve  Admitted w/ DKA   Started on IV fluids and insulin gtt. Aggressive lyte repletion. Started on empiric Abx for colitis. Pan culture including stool PCR, O&P and C diff  Droplet precautions  Check A1c, lipid panel and TSH 66M w/ PMHx DM, HTN, CAD s/p PCI, CHF s/p ICD admitted w/ N/V and profuse diarrhea of 2 days duration. He has been off insulin for the past 2 days as well. CT w/ pancolitis and also found to be parainfluenza +ve  Admitted w/ DKA   Started on IV fluids and insulin gtt. Aggressive lyte repletion. Started on empiric Abx for colitis. Pan culture including stool PCR, O&P and C diff  Droplet precautions  Check A1c, lipid panel and TSH  Hold lasix

## 2021-12-16 NOTE — ED PROVIDER NOTE - OBJECTIVE STATEMENT
66yM with htn, CAD s/p stent x1, CHF, MI, DM presenting with 2 days of nausea, vomiting, and diarrhea. Has had 5 episodes of NBNB vomiting and poor PO intake. Reports multiple episodes of non-bloody diarrhea today as well. Denies fevers, chest pain or dysuria. Reports some trouble breathing today after vomiting multiple times. No vaccinated for COVID. Denies sick contacts or recent travel.

## 2021-12-16 NOTE — H&P ADULT - HISTORY OF PRESENT ILLNESS
Patient is a 66y old  Male who presents with a chief complaint of nausea, vomiting, cough.     BRIEF HOSPITAL COURSE:     Patient is a 66y male with history of htn, CAD s/p stent x1, CHF, MI, DM on Basalgar and Novolog who presents with 2 days of nausea, vomiting, and diarrhea. Patient states that he developed cough several days ago and felt unwell. He stopped taking his insulin at this time. This am, patient experienced several episodes of nausea and vomiting, prompting him to visit the ED. Reports mild epigastric abdominal pain. He is not vaccinated for COVID. Denies recent fever/chills, sore throat, cp, sob, back pain, urinary complaints, headache.       Events last 24 hours: ***    PAST MEDICAL & SURGICAL HISTORY:  CAD (Coronary Artery Disease)  LAD occlusion    HTN (Hypertension)  controled with meds    Diabetes    Depression    Abnormal nuclear stress test    S/P Inguinal Hernia Repair  ishaan with mesh 2008    S/P CABG (coronary artery bypass graft)      Allergies    Toprol-XL (Faint; Other)    Intolerances      FAMILY HISTORY:      Review of Systems:  CONSTITUTIONAL: No fever, chills, or fatigue  EYES: No eye pain, visual disturbances, or discharge  ENMT:  No difficulty hearing, tinnitus, vertigo; No sinus or throat pain  NECK: No pain or stiffness  RESPIRATORY: No cough, wheezing, chills or hemoptysis; No shortness of breath  CARDIOVASCULAR: No chest pain, palpitations, dizziness, or leg swelling  GASTROINTESTINAL: No abdominal or epigastric pain. No nausea, vomiting, or hematemesis; No diarrhea or constipation. No melena or hematochezia.  GENITOURINARY: No dysuria, frequency, hematuria, or incontinence  NEUROLOGICAL: No headaches, memory loss, loss of strength, numbness, or tremors  SKIN: No itching, burning, rashes, or lesions   MUSCULOSKELETAL: No joint pain or swelling; No muscle, back, or extremity pain  PSYCHIATRIC: No depression, anxiety, mood swings, or difficulty sleeping      Medications:                  insulin regular Infusion 7 Unit(s)/Hr IV Continuous <Continuous>    lactated ringers. 1000 milliLiter(s) IV Continuous <Continuous>                ICU Vital Signs Last 24 Hrs  T(C): 36.5 (16 Dec 2021 18:48), Max: 37.3 (16 Dec 2021 13:58)  T(F): 97.7 (16 Dec 2021 18:48), Max: 99.1 (16 Dec 2021 13:58)  HR: 109 (16 Dec 2021 18:48) (95 - 109)  BP: 176/108 (16 Dec 2021 18:48) (176/108 - 192/112)  BP(mean): --  ABP: --  ABP(mean): --  RR: 20 (16 Dec 2021 18:48) (20 - 20)  SpO2: 96% (16 Dec 2021 18:48) (96% - 100%)    Vital Signs Last 24 Hrs  T(C): 36.5 (16 Dec 2021 18:48), Max: 37.3 (16 Dec 2021 13:58)  T(F): 97.7 (16 Dec 2021 18:48), Max: 99.1 (16 Dec 2021 13:58)  HR: 109 (16 Dec 2021 18:48) (95 - 109)  BP: 176/108 (16 Dec 2021 18:48) (176/108 - 192/112)  BP(mean): --  RR: 20 (16 Dec 2021 18:48) (20 - 20)  SpO2: 96% (16 Dec 2021 18:48) (96% - 100%)    ABG - ( 16 Dec 2021 21:46 )  pH, Arterial: 7.320 pH, Blood: x     /  pCO2: 34    /  pO2: 75    / HCO3: 18    / Base Excess: -8.6  /  SaO2: 94.3                I&O's Detail        LABS:                        12.8   10.13 )-----------( 166      ( 16 Dec 2021 15:16 )             37.9     12-16    139  |  102  |  38.2<H>  ----------------------------<  476<H>  3.8   |  17.0<L>  |  1.20    Ca    7.4<L>      16 Dec 2021 21:22    TPro  7.0  /  Alb  3.1<L>  /  TBili  0.6  /  DBili  x   /  AST  44<H>  /  ALT  28  /  AlkPhos  105  12-16      CARDIAC MARKERS ( 16 Dec 2021 15:16 )  x     / 0.03 ng/mL / x     / x     / x          CAPILLARY BLOOD GLUCOSE      POCT Blood Glucose.: 441 mg/dL (16 Dec 2021 16:30)        CULTURES:      Physical Examination:    General: No acute distress.  Alert, oriented, interactive, nonfocal    HEENT: Pupils equal, reactive to light.  Symmetric.    PULM: Clear to auscultation bilaterally, no significant sputum production    CVS: Regular rate and rhythm, no murmurs, rubs, or gallops    ABD: Soft, nondistended, nontender, normoactive bowel sounds, no masses    EXT: No edema, nontender    SKIN: Warm and well perfused, no rashes noted.    RADIOLOGY: ***    CRITICAL CARE TIME SPENT: ***     Patient is a 66y old  Male who presents with a chief complaint of nausea, vomiting, cough.     BRIEF HOSPITAL COURSE:     Patient is a 66y male with history of htn, CAD s/p stent x1, CHF, MI, DM on Basalgar and Novolog who presents with 2 days of nausea, vomiting, and diarrhea. Patient states that he developed cough several days ago and felt unwell. He stopped taking his insulin at this time. This am, patient experienced several episodes of nausea and vomiting, prompting him to visit the ED. Reports mild epigastric abdominal pain. He is not vaccinated for COVID. Denies recent fever/chills, sore throat, cp, sob, back pain, urinary complaints, headache.     In ED, patient found to be in DKA, started on insulin and fluids. Additionally, patient underwent CT scan which revealed Pancolitis. Patient denies recent antibiotic use, denies other recent GI symptoms.     PAST MEDICAL & SURGICAL HISTORY:  CAD (Coronary Artery Disease)  LAD occlusion    HTN (Hypertension)  controled with meds    Diabetes    Depression    Abnormal nuclear stress test    S/P Inguinal Hernia Repair  ishaan with mesh 2008    S/P CABG (coronary artery bypass graft)      Allergies    Toprol-XL (Faint; Other)    Intolerances      FAMILY HISTORY:      Review of Systems:  CONSTITUTIONAL: No fever, chills, or fatigue  EYES: No eye pain, visual disturbances, or discharge  ENMT:  No difficulty hearing, tinnitus, vertigo; No sinus or throat pain  NECK: No pain or stiffness  RESPIRATORY: No cough, wheezing, chills or hemoptysis; No shortness of breath  CARDIOVASCULAR: No chest pain, palpitations, dizziness, or leg swelling  GASTROINTESTINAL: Mild abdominal pain, epigastric region. Positive nausea, vomiting, diarrhea.   GENITOURINARY: No dysuria, frequency, hematuria, or incontinence  NEUROLOGICAL: No headaches, memory loss, loss of strength, numbness, or tremors  SKIN: No itching, burning, rashes, or lesions   MUSCULOSKELETAL: No joint pain or swelling; No muscle, back, or extremity pain  PSYCHIATRIC: No depression, anxiety, mood swings, or difficulty sleeping      Medications:                  insulin regular Infusion 7 Unit(s)/Hr IV Continuous <Continuous>    lactated ringers. 1000 milliLiter(s) IV Continuous <Continuous>                ICU Vital Signs Last 24 Hrs  T(C): 36.5 (16 Dec 2021 18:48), Max: 37.3 (16 Dec 2021 13:58)  T(F): 97.7 (16 Dec 2021 18:48), Max: 99.1 (16 Dec 2021 13:58)  HR: 109 (16 Dec 2021 18:48) (95 - 109)  BP: 176/108 (16 Dec 2021 18:48) (176/108 - 192/112)  BP(mean): --  ABP: --  ABP(mean): --  RR: 20 (16 Dec 2021 18:48) (20 - 20)  SpO2: 96% (16 Dec 2021 18:48) (96% - 100%)    Vital Signs Last 24 Hrs  T(C): 36.5 (16 Dec 2021 18:48), Max: 37.3 (16 Dec 2021 13:58)  T(F): 97.7 (16 Dec 2021 18:48), Max: 99.1 (16 Dec 2021 13:58)  HR: 109 (16 Dec 2021 18:48) (95 - 109)  BP: 176/108 (16 Dec 2021 18:48) (176/108 - 192/112)  BP(mean): --  RR: 20 (16 Dec 2021 18:48) (20 - 20)  SpO2: 96% (16 Dec 2021 18:48) (96% - 100%)    ABG - ( 16 Dec 2021 21:46 )  pH, Arterial: 7.320 pH, Blood: x     /  pCO2: 34    /  pO2: 75    / HCO3: 18    / Base Excess: -8.6  /  SaO2: 94.3                I&O's Detail        LABS:                        12.8   10.13 )-----------( 166      ( 16 Dec 2021 15:16 )             37.9     12-16    139  |  102  |  38.2<H>  ----------------------------<  476<H>  3.8   |  17.0<L>  |  1.20    Ca    7.4<L>      16 Dec 2021 21:22    TPro  7.0  /  Alb  3.1<L>  /  TBili  0.6  /  DBili  x   /  AST  44<H>  /  ALT  28  /  AlkPhos  105  12-16      CARDIAC MARKERS ( 16 Dec 2021 15:16 )  x     / 0.03 ng/mL / x     / x     / x          CAPILLARY BLOOD GLUCOSE      POCT Blood Glucose.: 441 mg/dL (16 Dec 2021 16:30)        CULTURES:      Physical Examination:    General: No acute distress.  Alert, oriented, interactive, nonfocal    HEENT: Pupils equal, reactive to light.  Symmetric.    PULM: Clear to auscultation bilaterally, no significant sputum production    CVS: Regular rate and rhythm, no murmurs, rubs, or gallops    ABD: Soft, nondistended, nontender, normoactive bowel sounds, no masses    EXT: No edema, nontender    SKIN: Warm and well perfused, no rashes noted.    CRITICAL CARE TIME SPENT: 45 minutes critical care time spent on encounter including interpreting labs, imaging, speaking with patient and determining appropriate medical management.

## 2021-12-16 NOTE — ED PROVIDER NOTE - ATTENDING CONTRIBUTION TO CARE
Kun: I performed a face to face evaluation of this patient and performed a full history and physical examination on the patient.  I agree with the resident's history, physical examination, and plan of the patient unless otherwise noted. My brief assessment is as follows: 2 days vomiting and diarrhea, no blood in either, no melena/coffee ground emesis. denies cp/significnat abd pain. no f/c. mild sob after vomiting. no a/c. non toxic, dry heaving intermittently, equal pulses b/l. ctab, rrr, abd benign, neuro intact, ekg, labs, symptom control, reassess

## 2021-12-16 NOTE — ED PROVIDER NOTE - PROGRESS NOTE DETAILS
Kun: pt with hyperglycemia, ketosis, no acidosis. still with vomiting. has pancolitis and parainfluenza positive. dehydration based on labs, getting fluid but given prior EF's slower fluid. micu consulted at hospitalist request. : took sign out from , patient seen by ICU. recommend another LR 1 L slow. repeat BMP and ABG @ 9 pm. : repeat BMP and ABG done, glucose still high with a gap, pH 7.3. evaluated by MICU, will admit to MICU.

## 2021-12-16 NOTE — H&P ADULT - ASSESSMENT
65 yo male hx of DM presents w cough for past several days, nausea and vomiting this am. Found to be in DKA in the ED.     DKA:  -Administer 1L Lactated Ringers  -Continue on insulin drip until Glucose <200, then switch to SQ insulin.   -Start on maintenance fluids (LR at 100 ml/hr)  -Monitor for closure of anion gap    Parainfluenza Positive:  -Droplet precautions    Neuro:  -Regular neuro checks    Respiratory:  -Incentive spirometry  -Parainfluenza positive, institute droplet precautions    CV:   -Restart home HTN medications    GI:  -Pancolitis: No recent antibiotic use, do not suspect C. Diff at this time. Will start patient on Zosyn.   -Diet NPO    Endocrine:  -Diabetes management as indicated above    Heme:  Start on SQ Heparin.     Case discussed with MICU attending, Dr. Pack, who also saw and examined the patient.

## 2021-12-16 NOTE — ED PROVIDER NOTE - NS ED ROS FT
Gen: No fever, decreased PO intake  Resp: +shortness of brreath  Cardiovascular: No chest pain  Gastrointestinal: +Nausea, +vomiting, +diarrhea  : ; no dysuria  Skin: No rashes  Neuro: No headache  Remainder negative, except as per the HPI

## 2021-12-17 NOTE — ED ADULT NURSE REASSESSMENT NOTE - NS ED NURSE REASSESS COMMENT FT1
Received patient from off going shift. Patient AXOX3. Denies pain or discomfort. Remains on contact and droplet isolation. education provided with positive teach back. Patient reports no BMs throughout the night. Iv aBX as RX. Blood sugarwas 130. Per VANESA Solano restart insulin drip @ 1unit. Give 40meq of potassium po as ordered. Do not give additionaL ivpb. Patient updated on plan of care.Verbalize understanding call bell within reach.

## 2021-12-17 NOTE — ED ADULT NURSE REASSESSMENT NOTE - NS ED NURSE REASSESS COMMENT FT1
[facility] is now 96, [facility] insulin drip for one hour and then recheck fs.

## 2021-12-17 NOTE — ED ADULT NURSE REASSESSMENT NOTE - NS ED NURSE REASSESS COMMENT FT1
Assumed care of pt from previous RN. A&Ox4, RR even and unlabored on RA. Skin is warm and dry. PT placed on insulin drip at this time.  Reports he has not taken his diabetes medication in 2 days due to not feeling well.  Denies pain or SOB at this time. sinus tach on the CM.

## 2021-12-17 NOTE — CHART NOTE - NSCHARTNOTEFT_GEN_A_CORE
Pt seen and examined.  Reports he had N/V/D x a few days and also a "cold" and because he wasn't feeling well he didn't take his medication.  Presented to ed f/w pancolitis and DKA.  Zosyn started stool cultures pending as patient has had no diarrhea since inpatinet.  AG closed and patient weaned off gtt back to home regimen.  Endo consulted and patient downgraded to hospitalists service.  D/w Dr Banerjee, in agreement with plan.

## 2021-12-17 NOTE — CONSULT NOTE ADULT - ASSESSMENT
66y male with history of htn, CAD s/p stent x1, CHF, MI, DM on Basalgar and Novolog who presents with 2 days of nausea, vomiting, and diarrhea. Patient states that he developed cough several days ago and felt unwell. He stopped taking his insulin sec to diarrhea, N/V. Unvaccinated. Treated by MICU. AG closed. D/G. still with MA and MING. Incidental Parainfluenza +. Pancolitis on CT.    # DKA  Now AG closed  still MING and MA  RL at 100ml to continue  Home dose insulin restarted by ICU  His PM Lantus was given at AM here  Endocrine following    # MING  sec to above  aggressive hydration    # N/V/D at home, CT with Pancolitis  no antibx use history  Empiric Zosyn started by ICU  stool studies pending as no BMs here    # Parainfluenza virus +  Symptomatic treatment  precautions    # CAD, CHF  COnt home meds  ASA, Brilinta, Coreg, Statin    Heparin    Plan of care discussed with patient

## 2021-12-17 NOTE — CONSULT NOTE ADULT - SUBJECTIVE AND OBJECTIVE BOX
MICU DOWNGRADE ACCEPTANCE NOTE      HPI:  Patient is a 66y male with history of htn, CAD s/p stent x1, CHF, MI, DM on Basalgar and Novolog who presents with 2 days of nausea, vomiting, and diarrhea. Patient states that he developed cough several days ago and felt unwell. He stopped taking his insulin sec to diarrhea. This am, patient experienced several episodes of nausea and vomiting, prompting him to visit the ED. Reports mild epigastric abdominal pain. He is not vaccinated for COVID. Denies recent fever/chills, sore throat, cp, sob, back pain, urinary complaints, headache.     In ED, patient found to be in DKA, started on insulin and fluids. Additionally, patient underwent CT scan which revealed Pancolitis. Patient denies recent antibiotic use, denies other recent GI symptoms.     BRIEF HOSPITAL COURSE:   Managed by MICU. AG closed. Empiric Zosyn started for Pancolitis on CT. However stool studies not done as patient without BM now. Downgraded to Med/ Surg.   Seen by ENdocrine    PAST MEDICAL & SURGICAL HISTORY:  CAD (Coronary Artery Disease)  LAD occlusion  HTN (Hypertension)  controled with meds  Diabetes  Depression  Abnormal nuclear stress test  S/P Inguinal Hernia Repair  ishaan with mesh   S/P CABG (coronary artery bypass graft)      Allergies  Toprol-XL (Faint; Other)    FAMILY HISTORY:  DM int he family    SOCIAL HISTORY:  Denies habits    SUBJECTIVE:    comfortable  offers no complaints  plan of care explained    Review of Systems:  as above    ICU Vital Signs Last 24 Hrs  T(C): 36.5 (16 Dec 2021 18:48), Max: 37.3 (16 Dec 2021 13:58)  T(F): 97.7 (16 Dec 2021 18:48), Max: 99.1 (16 Dec 2021 13:58)  HR: 109 (16 Dec 2021 18:48) (95 - 109)  BP: 176/108 (16 Dec 2021 18:48) (176/108 - 192/112)  BP(mean): --  ABP: --  ABP(mean): --  RR: 20 (16 Dec 2021 18:48) (20 - 20)  SpO2: 96% (16 Dec 2021 18:48) (96% - 100%)    PHYSICAL EXAM:    GENERAL: Comfortable, denies N/V?D  HEAD:  Atraumatic, Normocephalic  EYES: EOMI, conjunctiva and sclera clear  ENT: Moist mucous membranes, No lesions  NECK: Supple, No JVD, Normal thyroid  NERVOUS SYSTEM:  Alert & Oriented X 3, Good concentration; Motor Strength 5/5 B/L upper and lower extremities  CHEST/LUNG: CTA bilaterally; No rales, rhonchi, wheezing, or rubs  HEART: Regular rate and rhythm; No murmurs, rubs, or gallops  ABDOMEN: Soft, Nontender, Nondistended; Bowel sounds present  EXTREMITIES:  2+ Peripheral Pulses, No clubbing, cyanosis, or edema  SKIN: No rashes or lesions    MEDICATIONS  (STANDING):  aspirin enteric coated 81 milliGRAM(s) Oral daily  atorvastatin Oral Tab/Cap - Peds 40 milliGRAM(s) Oral daily  carvedilol 3.125 milliGRAM(s) Oral every 12 hours  dextrose 40% Gel 15 Gram(s) Oral once  dextrose 40% Gel 15 Gram(s) Oral once  dextrose 50% Injectable 25 Gram(s) IV Push once  dextrose 50% Injectable 12.5 Gram(s) IV Push once  dextrose 50% Injectable 25 Gram(s) IV Push once  dextrose 50% Injectable 25 Gram(s) IV Push once  dextrose 50% Injectable 12.5 Gram(s) IV Push once  dextrose 50% Injectable 25 Gram(s) IV Push once  gabapentin 100 milliGRAM(s) Oral three times a day  glucagon  Injectable 1 milliGRAM(s) IntraMuscular once  glucagon  Injectable 1 milliGRAM(s) IntraMuscular once  heparin   Injectable 5000 Unit(s) SubCutaneous every 12 hours  insulin lispro (ADMELOG) corrective regimen sliding scale  SubCutaneous Before meals and at bedtime  insulin lispro Injectable (ADMELOG) 15 Unit(s) SubCutaneous three times a day before meals  lactated ringers. 1000 milliLiter(s) (100 mL/Hr) IV Continuous <Continuous>  levETIRAcetam 750 milliGRAM(s) Oral two times a day  piperacillin/tazobactam IVPB.. 3.375 Gram(s) IV Intermittent every 8 hours  sodium chloride 0.9% lock flush 3 milliLiter(s) IV Push every 8 hours  ticagrelor 90 milliGRAM(s) Oral every 12 hours      LABS:                        12.8   10.13 )-----------( 166      ( 16 Dec 2021 15:16 )             37.9     12-16    139  |  102  |  38.2<H>  ----------------------------<  476<H>  3.8   |  17.0<L>  |  1.20    Ca    7.4<L>      16 Dec 2021 21:22    TPro  7.0  /  Alb  3.1<L>  /  TBili  0.6  /  DBili  x   /  AST  44<H>  /  ALT  28  /  AlkPhos  105  12-16      CARDIAC MARKERS ( 16 Dec 2021 15:16 )  x     / 0.03 ng/mL / x     / x     / x          CAPILLARY BLOOD GLUCOSE      POCT Blood Glucose.: 441 mg/dL (16 Dec 2021 16:30)    Urinalysis Basic - ( 16 Dec 2021 23:01 )    Color: Yellow / Appearance: Clear / S.015 / pH: x  Gluc: x / Ketone: Small  / Bili: Negative / Urobili: Negative mg/dL   Blood: x / Protein: 300 mg/dL / Nitrite: Negative   Leuk Esterase: Negative / RBC: 6-10 /HPF / WBC 0-2   Sq Epi: x / Non Sq Epi: Occasional / Bacteria: Occasional      RADIOLOGY & ADDITIONAL STUDIES:  Reviewed by me

## 2021-12-17 NOTE — CONSULT NOTE ADULT - SUBJECTIVE AND OBJECTIVE BOX
HPI:  Patient is a 66y male with history of htn, CAD s/p stent x1, CHF, MI, DM on Basalgar and Novolog who presents with 2 days of nausea, vomiting, and diarrhea. Patient states that he developed cough several days ago and felt unwell. He stopped taking his insulin at this time. This am, patient experienced several episodes of nausea and vomiting, prompting him to visit the ED. Reports mild epigastric abdominal pain. Denies recent fever/chills, sore throat, cp, sob, back pain, urinary complaints, headache. Found to be in DKA, started on insulin and fluids. CT scan which revealed Pancolitis. Patient denies recent antibiotic use, denies other recent GI symptoms.     PAST MEDICAL & SURGICAL HISTORY:  CAD (Coronary Artery Disease)  LAD occlusion  HTN (Hypertension) controled with meds  Diabetes  Depression  Abnormal nuclear stress test    S/P Inguinal Hernia Repair  ishaan with mesh   S/P CABG (coronary artery bypass graft)    Allergies  Toprol-XL (Faint; Other)    FAMILY HISTORY:    Review of Systems:  CONSTITUTIONAL: No fever, chills, or fatigue  EYES: No eye pain, visual disturbances, or discharge  ENMT:  No difficulty hearing, tinnitus, vertigo; No sinus or throat pain  NECK: No pain or stiffness  RESPIRATORY: No cough, wheezing, chills or hemoptysis; No shortness of breath  CARDIOVASCULAR: No chest pain, palpitations, dizziness, or leg swelling  GASTROINTESTINAL: Mild abdominal pain, epigastric region. Positive nausea, vomiting, diarrhea.   GENITOURINARY: No dysuria, frequency, hematuria, or incontinence  NEUROLOGICAL: No headaches, memory loss, loss of strength, numbness, or tremors  SKIN: No itching, burning, rashes, or lesions   MUSCULOSKELETAL: No joint pain or swelling; No muscle, back, or extremity pain  PSYCHIATRIC: No depression, anxiety, mood swings, or difficulty sleeping    Medications:  insulin regular Infusion 7 Unit(s)/Hr IV Continuous <Continuous>  lactated ringers. 1000 milliLiter(s) IV Continuous <Continuous>    ICU Vital Signs Last 24 Hrs  T(C): 36.5 (16 Dec 2021 18:48), Max: 37.3 (16 Dec 2021 13:58)  T(F): 97.7 (16 Dec 2021 18:48), Max: 99.1 (16 Dec 2021 13:58)  HR: 109 (16 Dec 2021 18:48) (95 - 109)  BP: 176/108 (16 Dec 2021 18:48) (176/108 - 192/112)  BP(mean): --  ABP: --  ABP(mean): --  RR: 20 (16 Dec 2021 18:48) (20 - 20)  SpO2: 96% (16 Dec 2021 18:48) (96% - 100%)    Vital Signs Last 24 Hrs  T(C): 36.5 (16 Dec 2021 18:48), Max: 37.3 (16 Dec 2021 13:58)  T(F): 97.7 (16 Dec 2021 18:48), Max: 99.1 (16 Dec 2021 13:58)  HR: 109 (16 Dec 2021 18:48) (95 - 109)  BP: 176/108 (16 Dec 2021 18:48) (176/108 - 192/112)  BP(mean): --  RR: 20 (16 Dec 2021 18:48) (20 - 20)  SpO2: 96% (16 Dec 2021 18:48) (96% - 100%)    ABG - ( 16 Dec 2021 21:46 )  pH, Arterial: 7.320 pH, Blood: x     /  pCO2: 34    /  pO2: 75    / HCO3: 18    / Base Excess: -8.6  /  SaO2: 94.3      LABS:                        12.8   10.13 )-----------( 166      ( 16 Dec 2021 15:16 )             37.9     12-16    139  |  102  |  38.2<H>  ----------------------------<  476<H>  3.8   |  17.0<L>  |  1.20    Ca    7.4<L>      16 Dec 2021 21:22    TPro  7.0  /  Alb  3.1<L>  /  TBili  0.6  /  DBili  x   /  AST  44<H>  /  ALT  28  /  AlkPhos  105  12-16      CARDIAC MARKERS ( 16 Dec 2021 15:16 )  x     / 0.03 ng/mL / x     / x     / x          CAPILLARY BLOOD GLUCOSE  POCT Blood Glucose.: 441 mg/dL (16 Dec 2021 16:30)    Physical Examination:  General: No acute distress.  Alert, oriented, interactive, nonfocal  HEENT: Pupils equal, reactive to light.  Symmetric.  PULM: Clear to auscultation bilaterally, no significant sputum production  CVS: Regular rate and rhythm, no murmurs, rubs, or gallops  ABD: Soft, nondistended, nontender, normoactive bowel sounds, no masses  EXT: No edema, nontender  SKIN: Warm and well perfused, no rashes noted.    LABS:                        9.8    12.09 )-----------( 118      ( 17 Dec 2021 03:38 )             28.9     -    135  |  103  |  40.7<H>  ----------------------------<  188<H>  4.1   |  17.0<L>  |  1.45<H>    Ca    7.6<L>      17 Dec 2021 10:08  Phos  3.0       Mg     1.9         TPro  5.6<L>  /  Alb  2.7<L>  /  TBili  0.4  /  DBili  x   /  AST  119<H>  /  ALT  35  /  AlkPhos  75      Urinalysis Basic - ( 16 Dec 2021 23:01 )    Color: Yellow / Appearance: Clear / S.015 / pH: x  Gluc: x / Ketone: Small  / Bili: Negative / Urobili: Negative mg/dL   Blood: x / Protein: 300 mg/dL / Nitrite: Negative   Leuk Esterase: Negative / RBC: 6-10 /HPF / WBC 0-2   Sq Epi: x / Non Sq Epi: Occasional / Bacteria: Occasional      LIVER FUNCTIONS - ( 17 Dec 2021 10:08 )  Alb: 2.7 g/dL / Pro: 5.6 g/dL / ALK PHOS: 75 U/L / ALT: 35 U/L / AST: 119 U/L / GGT: x                  HPI:  Patient is a 66y male with history of htn, CAD s/p stent x1, CHF, MI, DM on Basalgar and Novolog who presents with 2 days of nausea, vomiting, and diarrhea. Patient states that he developed cough several days ago and felt unwell. He stopped taking his insulin at this time. This am, patient experienced several episodes of nausea and vomiting, prompting him to visit the ED. Reports mild epigastric abdominal pain. Denies recent fever/chills, sore throat, cp, sob, back pain, urinary complaints, headache. Found to be in DKA, started on insulin and fluids. CT scan which revealed Pancolitis. Patient denies recent antibiotic use, denies other recent GI symptoms. Has had DM2 since . He trakes absaglar 25 u prn and novolog TID w meals. HE has advanced retinopathy and has neuropathy in treatment with gabapentin.     PAST MEDICAL & SURGICAL HISTORY:  CAD (Coronary Artery Disease)  LAD occlusion  HTN (Hypertension) controled with meds  Diabetes  Depression  Abnormal nuclear stress test    S/P Inguinal Hernia Repair  ishaan with mesh   S/P CABG (coronary artery bypass graft)    Allergies  Toprol-XL (Faint; Other)    FAMILY HISTORY: no dm,    Review of Systems:  CONSTITUTIONAL: No fever, chills, or fatigue  EYES: No eye pain, visual disturbances  ENMT:  No difficulty hearing, No sinus or throat pain  NECK: No pain or stiffness  RESPIRATORY: No cough, wheezing, chills or hemoptysis; No shortness of breath  CARDIOVASCULAR: No chest pain, palpitations, dizziness, or leg swelling  GASTROINTESTINAL: Mild abdominal pain, epigastric region. Positive nausea, vomitin  , diarrhea.   GENITOURINARY: No dysuria, frequency, hematuria, or incontinence  NEUROLOGICAL: No headaches,  loss of strength, numbness, or tremors  SKIN: No itching, burning, rashes  MUSCULOSKELETAL: No joint pain or swelling; No muscle, back, or extremity pain  PSYCHIATRIC: No depression, anxiety  Medications:  insulin regular Infusion 7 Unit(s)/Hr IV Continuous <Continuous>  lactated ringers. 1000 milliLiter(s) IV Continuous <Continuous>    ICU Vital Signs Last 24 Hrs  T(C): 36.5 (16 Dec 2021 18:48), Max: 37.3 (16 Dec 2021 13:58)  T(F): 97.7 (16 Dec 2021 18:48), Max: 99.1 (16 Dec 2021 13:58)  HR: 109 (16 Dec 2021 18:48) (95 - 109)  BP: 176/108 (16 Dec 2021 18:48) (176/108 - 192/112)  BP(mean): --  ABP: --  ABP(mean): --  RR: 20 (16 Dec 2021 18:48) (20 - 20)  SpO2: 96% (16 Dec 2021 18:48) (96% - 100%)    Vital Signs Last 24 Hrs  T(C): 36.5 (16 Dec 2021 18:48), Max: 37.3 (16 Dec 2021 13:58)  T(F): 97.7 (16 Dec 2021 18:48), Max: 99.1 (16 Dec 2021 13:58)  HR: 109 (16 Dec 2021 18:48) (95 - 109)  BP: 176/108 (16 Dec 2021 18:48) (176/108 - 192/112)  BP(mean): --  RR: 20 (16 Dec 2021 18:48) (20 - 20)  SpO2: 96% (16 Dec 2021 18:48) (96% - 100%)    ABG - ( 16 Dec 2021 21:46 )  pH, Arterial: 7.320 pH, Blood: x     /  pCO2: 34    /  pO2: 75    / HCO3: 18    / Base Excess: -8.6  /  SaO2: 94.3      LABS:                        12.8   10.13 )-----------( 166      ( 16 Dec 2021 15:16 )             37.9     12-16    139  |  102  |  38.2<H>  ----------------------------<  476<H>  3.8   |  17.0<L>  |  1.20    Ca    7.4<L>      16 Dec 2021 21:22    TPro  7.0  /  Alb  3.1<L>  /  TBili  0.6  /  DBili  x   /  AST  44<H>  /  ALT  28  /  AlkPhos  105  12-16      CARDIAC MARKERS ( 16 Dec 2021 15:16 )  x     / 0.03 ng/mL / x     / x     / x          CAPILLARY BLOOD GLUCOSE  POCT Blood Glucose.: 441 mg/dL (16 Dec 2021 16:30)    Physical Examination:General: No acute distress.  Alert, oriented, interactive, nonfocal  HEENT: Pupils equal, reactive to light.  Symmetric.  PULM: Clear to auscultation bilaterally, no significant sputum production  CVS: Regular rate and rhythm, no murmurs, rubs, or gallops  ABD: Soft, nondistended, nontender, normoactive bowel sounds, no masses  EXT: No edema, nontender  SKIN: Warm and well perfused, no rashes noted.    LABS:                        9.8    12.09 )-----------( 118      ( 17 Dec 2021 03:38 )             28.9     12-17    135  |  103  |  40.7<H>  ----------------------------<  188<H>  4.1   |  17.0<L>  |  1.45<H>    Ca    7.6<L>      17 Dec 2021 10:08  Phos  3.0     -  Mg     1.9     17    TPro  5.6<L>  /  Alb  2.7<L>  /  TBili  0.4  /  DBili  x   /  AST  119<H>  /  ALT  35  /  AlkPhos  75  12-17    Urinalysis Basic - ( 16 Dec 2021 23:01 )    Color: Yellow / Appearance: Clear / S.015 / pH: x  Gluc: x / Ketone: Small  / Bili: Negative / Urobili: Negative mg/dL   Blood: x / Protein: 300 mg/dL / Nitrite: Negative   Leuk Esterase: Negative / RBC: 6-10 /HPF / WBC 0-2   Sq Epi: x / Non Sq Epi: Occasional / Bacteria: Occasional      LIVER FUNCTIONS - ( 17 Dec 2021 10:08 )  Alb: 2.7 g/dL / Pro: 5.6 g/dL / ALK PHOS: 75 U/L / ALT: 35 U/L / AST: 119 U/L / GGT: x

## 2021-12-17 NOTE — CONSULT NOTE ADULT - ASSESSMENT
65 yo male hx of DM presents w cough for past several days, nausea and vomiting this am. Found to be in DKA in the ED.     DKA:  -Administer 1L Lactated Ringers  -Continue on insulin drip until Glucose <200, then switch to SQ insulin.   -Start on maintenance fluids (LR at 100 ml/hr)  -Monitor for closure of anion gap      CV:   -Restart home HTN medications    GI:  -Pancolitis: No recent antibiotic use, do not suspect C. Diff at this time. Will start patient on Zosyn.   -Diet NPO 65 yo male hx of DM presents w cough for past several days, nausea and vomiting this am. Found to be in DKA in the ED.     DKA secondary to respiratory infection and colitis, most likely. Gap closed now, bicarbonate still low but recovering.   - continue iv fluids  cc /hr   - monitor electrolytes and replete accordingly.   - he got lantus 20 u sc this am .    MING / CKI:   - severe dehydration   - cont iv fluids.  - monitor gFR     CV:   -Restart home HTN medications    GI:  -Pancolitis: No recent antibiotic use, do not suspect C. Diff at this time. Will start patient on Zosyn.   -Diet NPO

## 2021-12-17 NOTE — ED ADULT NURSE REASSESSMENT NOTE - NS ED NURSE REASSESS COMMENT FT1
Fs is 222, Micu Pa called and placed on 6 units of insulin and placed on dextrose 5% with LR at 150ml/hr.

## 2021-12-18 NOTE — PROGRESS NOTE ADULT - SUBJECTIVE AND OBJECTIVE BOX
SHASTA MCKINLEY  ----------------------------------------  The patient was seen at bedside. Patient with diabetic ketoacidosis. Reports persistent nausea but no vomiting or abdominal pain.    Vital Signs Last 24 Hrs  T(C): 36.3 (18 Dec 2021 05:00), Max: 36.3 (18 Dec 2021 05:00)  T(F): 97.3 (18 Dec 2021 05:00), Max: 97.3 (18 Dec 2021 05:00)  HR: 76 (18 Dec 2021 05:00) (76 - 86)  BP: 128/73 (18 Dec 2021 05:00) (112/62 - 149/78)  BP(mean): 96 (17 Dec 2021 23:00) (96 - 107)  RR: 16 (18 Dec 2021 05:00) (15 - 20)  SpO2: 98% (18 Dec 2021 05:00) (92% - 99%)    CAPILLARY BLOOD GLUCOSE  POCT Blood Glucose.: 103 mg/dL (18 Dec 2021 11:41)  POCT Blood Glucose.: 84 mg/dL (18 Dec 2021 08:09)  POCT Blood Glucose.: 86 mg/dL (17 Dec 2021 22:59)  POCT Blood Glucose.: 69 mg/dL (17 Dec 2021 18:13)  POCT Blood Glucose.: 88 mg/dL (17 Dec 2021 15:02)  POCT Blood Glucose.: 58 mg/dL (17 Dec 2021 14:33)    PHYSICAL EXAMINATION:  ----------------------------------------  General appearance: No acute distress, Awake, Alert  HEENT: Normocephalic, Atraumatic, Conjunctiva clear, EOMI  Neck: Supple, No JVD, No tenderness  Lungs: Breath sound equal bilaterally, No wheezes, No rales  Cardiovascular: S1S2, Regular rhythm  Abdomen: Soft, Nontender, Nondistended, No guarding/rebound, Positive bowel sounds  Extremities: No clubbing, No cyanosis, No edema, No calf tenderness  Neuro: Strength equal bilaterally, No tremors  Psychiatric: Appropriate mood, Normal affect    LABORATORY STUDIES:  ----------------------------------------             9.8    12.66 )-----------( 124      ( 18 Dec 2021 03:02 )             28.8     12-18    139  |  107  |  36.9<H>  ----------------------------<  69<L>  3.9   |  22.0  |  1.53<H>    Ca    7.4<L>      18 Dec 2021 03:02  Phos  2.4     12-18  Mg     1.9     12-18    TPro  5.6<L>  /  Alb  2.7<L>  /  TBili  0.4  /  DBili  x   /  AST  119<H>  /  ALT  35  /  AlkPhos  75  12-17    LIVER FUNCTIONS - ( 17 Dec 2021 10:08 )  Alb: 2.7 g/dL / Pro: 5.6 g/dL / ALK PHOS: 75 U/L / ALT: 35 U/L / AST: 119 U/L / GGT: x           CARDIAC MARKERS ( 16 Dec 2021 15:16 )  x     / 0.03 ng/mL / x     / x     / x        Urinalysis Basic - ( 16 Dec 2021 23:01 )  Color: Yellow / Appearance: Clear / S.015 / pH: x  Gluc: x / Ketone: Small  / Bili: Negative / Urobili: Negative mg/dL   Blood: x / Protein: 300 mg/dL / Nitrite: Negative   Leuk Esterase: Negative / RBC: 6-10 /HPF / WBC 0-2   Sq Epi: x / Non Sq Epi: Occasional / Bacteria: Occasional    MEDICATIONS  (STANDING):  aspirin enteric coated 81 milliGRAM(s) Oral daily  atorvastatin Oral Tab/Cap - Peds 40 milliGRAM(s) Oral daily  carvedilol 3.125 milliGRAM(s) Oral every 12 hours  dextrose 40% Gel 15 Gram(s) Oral once  dextrose 40% Gel 15 Gram(s) Oral once  dextrose 50% Injectable 25 Gram(s) IV Push once  dextrose 50% Injectable 12.5 Gram(s) IV Push once  dextrose 50% Injectable 25 Gram(s) IV Push once  dextrose 50% Injectable 25 Gram(s) IV Push once  dextrose 50% Injectable 12.5 Gram(s) IV Push once  dextrose 50% Injectable 25 Gram(s) IV Push once  gabapentin 100 milliGRAM(s) Oral three times a day  glucagon  Injectable 1 milliGRAM(s) IntraMuscular once  glucagon  Injectable 1 milliGRAM(s) IntraMuscular once  heparin   Injectable 5000 Unit(s) SubCutaneous every 12 hours  insulin glargine Injectable (LANTUS) 20 Unit(s) SubCutaneous every morning  insulin lispro (ADMELOG) corrective regimen sliding scale   SubCutaneous Before meals and at bedtime  lactated ringers. 1000 milliLiter(s) (100 mL/Hr) IV Continuous <Continuous>  levETIRAcetam 750 milliGRAM(s) Oral two times a day  piperacillin/tazobactam IVPB.. 3.375 Gram(s) IV Intermittent every 8 hours  sodium chloride 0.9% lock flush 3 milliLiter(s) IV Push every 8 hours  ticagrelor 90 milliGRAM(s) Oral every 12 hours    MEDICATIONS  (PRN):  ondansetron Injectable 4 milliGRAM(s) IV Push every 6 hours PRN Nausea and/or Vomiting      ASSESSMENT / PLAN:  ----------------------------------------  66y male with history of htn, CAD s/p stent x1, CHF, MI, DM on Basalgar and Novolog who presents with 2 days of nausea, vomiting, and diarrhea. Patient states that he developed cough several days ago and felt unwell. He stopped taking his insulin sec to diarrhea, N/V. Unvaccinated. Treated by MICU. AG closed. D/G. still with MA and MING. Incidental Parainfluenza +. Pancolitis on CT.    Diabetes / Diabetic ketoacidosis - Insulin infusion previously discontinued. On subcutaneous insulin coverage. To hold premeal insulin due to low glucose reading. Poor oral intake due to nausea.    Pancolitis / Nausea - CT abdomen results noted. Antiemetic medications as needed. Empiric antibiotics initiated by ICU. Blood culture results to be reviewed when available. Stool for GI PCR pending collection.    Acute kidney injury - On intravenous fluids. Monitoring renal function.    Parainfluenza - No respiratory symptoms. Supportive care.    Coronary artery disease - On aspirin, ticagrelor, atorvastatin, and carvedilol.

## 2021-12-18 NOTE — PROGRESS NOTE ADULT - ASSESSMENT
67 yo male hx of DM presents w cough for past several days, nausea and vomiting this am. Found to be in DKA in the ED.     DKA secondary to respiratory infection and colitis, most likely. Gap closed now, bicarbonate still low but recovering.   - pt bgs' low side. Hold insulin for now  - continue to monitor Bgs actid and hs   - monitor electrolytes and replete accordingly.     MING / CKI:   - cont iv fluids.  - monitor gFR     CV:   -Restart home HTN medications    GI:  -Pancolitis: No recent antibiotic use, do not suspect C. Diff at this time. Will start patient on Zosyn.   -Diet NPO

## 2021-12-18 NOTE — PROGRESS NOTE ADULT - SUBJECTIVE AND OBJECTIVE BOX
INTERVAL HPI/OVERNIGHT EVENTS:  followup for dm2 management     MEDICATIONS  (STANDING):  aspirin enteric coated 81 milliGRAM(s) Oral daily  atorvastatin Oral Tab/Cap - Peds 40 milliGRAM(s) Oral daily  carvedilol 3.125 milliGRAM(s) Oral every 12 hours  dextrose 40% Gel 15 Gram(s) Oral once  dextrose 40% Gel 15 Gram(s) Oral once  dextrose 50% Injectable 25 Gram(s) IV Push once  dextrose 50% Injectable 12.5 Gram(s) IV Push once  dextrose 50% Injectable 25 Gram(s) IV Push once  dextrose 50% Injectable 25 Gram(s) IV Push once  dextrose 50% Injectable 12.5 Gram(s) IV Push once  dextrose 50% Injectable 25 Gram(s) IV Push once  gabapentin 100 milliGRAM(s) Oral three times a day  glucagon  Injectable 1 milliGRAM(s) IntraMuscular once  glucagon  Injectable 1 milliGRAM(s) IntraMuscular once  heparin   Injectable 5000 Unit(s) SubCutaneous every 12 hours  insulin glargine Injectable (LANTUS) 20 Unit(s) SubCutaneous every morning  insulin lispro (ADMELOG) corrective regimen sliding scale   SubCutaneous Before meals and at bedtime  insulin lispro Injectable (ADMELOG) 15 Unit(s) SubCutaneous three times a day before meals  lactated ringers. 1000 milliLiter(s) (100 mL/Hr) IV Continuous <Continuous>  levETIRAcetam 750 milliGRAM(s) Oral two times a day  piperacillin/tazobactam IVPB.. 3.375 Gram(s) IV Intermittent every 8 hours  sodium chloride 0.9% lock flush 3 milliLiter(s) IV Push every 8 hours  ticagrelor 90 milliGRAM(s) Oral every 12 hours    MEDICATIONS  (PRN):  ondansetron Injectable 4 milliGRAM(s) IV Push every 6 hours PRN Nausea and/or Vomiting    Allergies  Toprol-XL (Faint; Other)    Review of systems: no sob , no n/v     Vital Signs Last 24 Hrs  T(C): 36.3 (18 Dec 2021 05:00), Max: 36.3 (18 Dec 2021 05:00)  T(F): 97.3 (18 Dec 2021 05:00), Max: 97.3 (18 Dec 2021 05:00)  HR: 76 (18 Dec 2021 05:00) (76 - 86)  BP: 128/73 (18 Dec 2021 05:00) (112/62 - 149/78)  BP(mean): 96 (17 Dec 2021 23:00) (96 - 107)  RR: 16 (18 Dec 2021 05:00) (15 - 20)  SpO2: 98% (18 Dec 2021 05:00) (92% - 99%)    PHYSICAL EXAM:  General: No acute distress.  Alert, oriented, interactive, nonfocal  HEENT: Pupils equal, reactive to light.  Symmetric.  PULM: Clear to auscultation bilaterally, no significant sputum production  CVS: Regular rate and rhythm, no murmurs, rubs, or gallops  ABD: Soft, nondistended, nontender, normoactive bowel sounds, no masses  EXT: No edema, nontender  SKIN: Warm and well perfused, no rashes noted.    LABS:                        9.8    12.66 )-----------( 124      ( 18 Dec 2021 03:02 )             28.8     12-18    139  |  107  |  36.9<H>  ----------------------------<  69<L>  3.9   |  22.0  |  1.53<H>    Ca    7.4<L>      18 Dec 2021 03:02  Phos  2.4     12-18  Mg     1.9     12-18    TPro  5.6<L>  /  Alb  2.7<L>  /  TBili  0.4  /  DBili  x   /  AST  119<H>  /  ALT  35  /  AlkPhos  75  12-17    Urinalysis Basic - ( 16 Dec 2021 23:01 )    Color: Yellow / Appearance: Clear / S.015 / pH: x  Gluc: x / Ketone: Small  / Bili: Negative / Urobili: Negative mg/dL   Blood: x / Protein: 300 mg/dL / Nitrite: Negative   Leuk Esterase: Negative / RBC: 6-10 /HPF / WBC 0-2   Sq Epi: x / Non Sq Epi: Occasional / Bacteria: Occasional    CAPILLARY BLOOD GLUCOSE  POCT Blood Glucose.: 103 mg/dL (18 Dec 2021 11:41)  POCT Blood Glucose.: 84 mg/dL (18 Dec 2021 08:09)  POCT Blood Glucose.: 86 mg/dL (17 Dec 2021 22:59)  POCT Blood Glucose.: 69 mg/dL (17 Dec 2021 18:13)  POCT Blood Glucose.: 88 mg/dL (17 Dec 2021 15:02)  POCT Blood Glucose.: 58 mg/dL (17 Dec 2021 14:33)

## 2021-12-19 NOTE — PATIENT PROFILE ADULT - FALL HARM RISK - HARM RISK INTERVENTIONS

## 2021-12-19 NOTE — PROGRESS NOTE ADULT - SUBJECTIVE AND OBJECTIVE BOX
SHASTA MCKINLEY  ----------------------------------------  The patient was seen at bedside. Patient with pancolitis. Reports feeling better today with less nausea and tolerated oral intake.    Vital Signs Last 24 Hrs  T(C): 36.5 (19 Dec 2021 05:20), Max: 36.8 (18 Dec 2021 16:32)  T(F): 97.7 (19 Dec 2021 05:20), Max: 98.3 (18 Dec 2021 16:32)  HR: 62 (19 Dec 2021 05:20) (62 - 93)  BP: 161/80 (19 Dec 2021 05:20) (159/89 - 162/90)  BP(mean): --  RR: 18 (19 Dec 2021 05:20) (16 - 18)  SpO2: 95% (19 Dec 2021 05:20) (93% - 99%)    CAPILLARY BLOOD GLUCOSE  POCT Blood Glucose.: 116 mg/dL (19 Dec 2021 11:59)  POCT Blood Glucose.: 97 mg/dL (19 Dec 2021 08:25)  POCT Blood Glucose.: 128 mg/dL (18 Dec 2021 21:33)  POCT Blood Glucose.: 106 mg/dL (18 Dec 2021 16:06)    PHYSICAL EXAMINATION:  ----------------------------------------  General appearance: No acute distress, Awake, Alert  HEENT: Normocephalic, Atraumatic, Conjunctiva clear, EOMI  Neck: Supple, No JVD, No tenderness  Lungs: Breath sound equal bilaterally, No wheezes, No rales  Cardiovascular: S1S2, Regular rhythm  Abdomen: Soft, Nontender, Nondistended, No guarding/rebound, Positive bowel sounds  Extremities: No clubbing, No cyanosis, No edema, No calf tenderness  Neuro: Strength equal bilaterally, No tremors  Psychiatric: Appropriate mood, Normal affect    LABORATORY STUDIES:  ----------------------------------------             9.7    8.50  )-----------( 120      ( 19 Dec 2021 07:32 )             28.3     12-19    138  |  105  |  29.2<H>  ----------------------------<  104<H>  3.6   |  20.0<L>  |  1.21    Ca    7.6<L>      19 Dec 2021 07:32  Phos  2.4     12-18  Mg     1.9     12-18    Culture - Blood (collected 16 Dec 2021 23:34)  Source: .Blood Blood-Venous  Preliminary Report (19 Dec 2021 01:00):    No growth at 48 hours    Culture - Blood (collected 16 Dec 2021 23:34)  Source: .Blood Blood-Venous  Preliminary Report (19 Dec 2021 01:00):    No growth at 48 hours    MEDICATIONS  (STANDING):  aspirin enteric coated 81 milliGRAM(s) Oral daily  atorvastatin Oral Tab/Cap - Peds 40 milliGRAM(s) Oral daily  carvedilol 3.125 milliGRAM(s) Oral every 12 hours  dextrose 40% Gel 15 Gram(s) Oral once  dextrose 40% Gel 15 Gram(s) Oral once  dextrose 50% Injectable 25 Gram(s) IV Push once  dextrose 50% Injectable 12.5 Gram(s) IV Push once  dextrose 50% Injectable 25 Gram(s) IV Push once  dextrose 50% Injectable 25 Gram(s) IV Push once  dextrose 50% Injectable 12.5 Gram(s) IV Push once  dextrose 50% Injectable 25 Gram(s) IV Push once  gabapentin 100 milliGRAM(s) Oral three times a day  glucagon  Injectable 1 milliGRAM(s) IntraMuscular once  glucagon  Injectable 1 milliGRAM(s) IntraMuscular once  heparin   Injectable 5000 Unit(s) SubCutaneous every 12 hours  insulin glargine Injectable (LANTUS) 20 Unit(s) SubCutaneous every morning  insulin lispro (ADMELOG) corrective regimen sliding scale   SubCutaneous Before meals and at bedtime  lactated ringers. 1000 milliLiter(s) (100 mL/Hr) IV Continuous <Continuous>  levETIRAcetam 750 milliGRAM(s) Oral two times a day  piperacillin/tazobactam IVPB.. 3.375 Gram(s) IV Intermittent every 8 hours  sodium chloride 0.9% lock flush 3 milliLiter(s) IV Push every 8 hours  ticagrelor 90 milliGRAM(s) Oral every 12 hours    MEDICATIONS  (PRN):  ondansetron Injectable 4 milliGRAM(s) IV Push every 6 hours PRN Nausea and/or Vomiting      ASSESSMENT / PLAN:  ----------------------------------------  66y male with history of htn, CAD s/p stent x1, CHF, MI, DM on Basalgar and Novolog who presents with 2 days of nausea, vomiting, and diarrhea. Patient states that he developed cough several days ago and felt unwell. He stopped taking his insulin sec to diarrhea, N/V. Unvaccinated. Treated by MICU. AG closed. D/G. still with MA and MING. Incidental Parainfluenza +. Pancolitis on CT.    Diabetes / Diabetic ketoacidosis - Anion gap resolved and insulin infusion previously discontinued. On subcutaneous insulin coverage. Will require further adjustment as the patient's nausea improves and oral intake increases.    Pancolitis / Nausea - CT abdomen results noted. Antiemetic medications as needed. Empiric antibiotics initiated by ICU, day #3. Blood cultures were without growth. Stool culture and GI PCR results to be reviewed when available.    Acute kidney injury - On intravenous fluids with improvement in renal function.    Parainfluenza - No respiratory symptoms. Supportive care.    Coronary artery disease - On aspirin, ticagrelor, atorvastatin, and carvedilol.

## 2021-12-20 NOTE — DISCHARGE NOTE PROVIDER - CARE PROVIDER_API CALL
Jimy Brian)  Family Medicine  [address] [place] [id number]  Phone: [phone]  Fax: [phone]  Follow Up Time:     Autumn Garcia)  Internal Medicine  [address] [place] [id number]  Phone: [phone]  Fax: [phone]  Follow Up Time:

## 2021-12-20 NOTE — DISCHARGE NOTE PROVIDER - NSDCMRMEDTOKEN_GEN_ALL_CORE_FT
aspirin 81 mg oral delayed release tablet: 1 tab(s) orally once a day  atorvastatin 40 mg oral tablet: 1 tab(s) orally once a day  Basaglar KwikPen 100 units/mL subcutaneous solution: subcutaneous once a day  carvedilol 12.5 mg oral tablet: 1 tab(s) orally 2 times a day  gabapentin 100 mg oral capsule: 1 cap(s) orally 3 times a day  Keppra 750 mg oral tablet: 1 tab(s) orally 2 times a day   Lasix 20 mg oral tablet: 1 tab(s) orally once a day  NovoLOG 100 units/mL subcutaneous solution: 15 unit(s) subcutaneous  ticagrelor 90 mg oral tablet: 1 tab(s) orally every 12 hours to start 1/5/21    aspirin 81 mg oral delayed release tablet: 1 tab(s) orally once a day  atorvastatin 40 mg oral tablet: 1 tab(s) orally once a day  carvedilol 12.5 mg oral tablet: 1 tab(s) orally 2 times a day  ciprofloxacin 500 mg oral tablet: 1 tab(s) orally every 12 hours   Entresto 49 mg-51 mg oral tablet: 1 tab(s) orally 2 times a day  gabapentin 100 mg oral capsule: 1 cap(s) orally 3 times a day  Keppra 750 mg oral tablet: 1 tab(s) orally 2 times a day   metroNIDAZOLE 500 mg oral tablet: 1 tab(s) orally 3 times a day   NovoLOG 100 units/mL subcutaneous solution: 15 unit(s) subcutaneous  ticagrelor 90 mg oral tablet: 1 tab(s) orally every 12 hours to start 1/5/21

## 2021-12-20 NOTE — DISCHARGE NOTE PROVIDER - NSDCCPCAREPLAN_GEN_ALL_CORE_FT
PRINCIPAL DISCHARGE DIAGNOSIS  Diagnosis: Vomiting  Assessment and Plan of Treatment: Continue on the the antibiotics. Follow up with your primary care physician for further management.      SECONDARY DISCHARGE DIAGNOSES  Diagnosis: Diabetes  Assessment and Plan of Treatment: Continue on Novolog at home and monitor glucose levels. Maintain adequate oral intake of food and liquids. Follow up with your primary care physician for resumption of basaglar.    Diagnosis: Heart failure  Assessment and Plan of Treatment: Continue on Entresto. Follow up with your cardiologist for resumption of Lasix and Aldactone.    Diagnosis: CAD (coronary artery disease)  Assessment and Plan of Treatment: Continue on Brilinta.

## 2021-12-20 NOTE — PROGRESS NOTE ADULT - ATTENDING COMMENTS
65 yo male hx of T2DM and CAD s/p DKA due to infection and nonadherence with meds  Fs improved, mild hypoglycemia this am that resolved w treatment, resume outpt diabetic meds on dicsharge

## 2021-12-20 NOTE — PROGRESS NOTE ADULT - SUBJECTIVE AND OBJECTIVE BOX
SHASTA MCKINLEY  ----------------------------------------  The patient was seen at bedside. Patient with diabetic ketoacidosis and colitis. Had some diabetes this morning. Tolerated oral intake.    Vital Signs Last 24 Hrs  T(C): 36.7 (20 Dec 2021 05:30), Max: 36.8 (19 Dec 2021 16:20)  T(F): 98 (20 Dec 2021 05:30), Max: 98.2 (19 Dec 2021 16:20)  HR: 79 (20 Dec 2021 05:30) (76 - 87)  BP: 174/86 (20 Dec 2021 05:30) (136/88 - 178/78)  BP(mean): --  RR: 18 (20 Dec 2021 05:30) (18 - 18)  SpO2: 96% (20 Dec 2021 05:30) (95% - 98%)    CAPILLARY BLOOD GLUCOSE  POCT Blood Glucose.: 125 mg/dL (20 Dec 2021 10:04)  POCT Blood Glucose.: 83 mg/dL (20 Dec 2021 09:06)  POCT Blood Glucose.: 51 mg/dL (20 Dec 2021 08:24)  POCT Blood Glucose.: 52 mg/dL (20 Dec 2021 08:17)  POCT Blood Glucose.: 125 mg/dL (19 Dec 2021 21:12)  POCT Blood Glucose.: 77 mg/dL (19 Dec 2021 17:19)  POCT Blood Glucose.: 75 mg/dL (19 Dec 2021 17:17)  POCT Blood Glucose.: 116 mg/dL (19 Dec 2021 11:59)    PHYSICAL EXAMINATION:  ----------------------------------------  General appearance: No acute distress, Awake, Alert  HEENT: Normocephalic, Atraumatic, Conjunctiva clear, EOMI  Neck: Supple, No JVD, No tenderness  Lungs: Breath sound equal bilaterally, No wheezes, No rales  Cardiovascular: S1S2, Regular rhythm  Abdomen: Soft, Nontender, Nondistended, No guarding/rebound, Positive bowel sounds  Extremities: No clubbing, No cyanosis, No edema, No calf tenderness  Neuro: Strength equal bilaterally, No tremors  Psychiatric: Appropriate mood, Normal affect    LABORATORY STUDIES:  ----------------------------------------             9.7    8.50  )-----------( 120      ( 19 Dec 2021 07:32 )             28.3     12-19    138  |  105  |  29.2<H>  ----------------------------<  104<H>  3.6   |  20.0<L>  |  1.21    Ca    7.6<L>      19 Dec 2021 07:32    GI PCR Panel, Stool (collected 19 Dec 2021 16:34)  Source: .Stool Feces  Final Report (19 Dec 2021 18:54):    GI PCR Results: NOT detected    *******Please Note:*******    GI panel PCR evaluates for:    Campylobacter, Plesiomonas shigelloides, Salmonella,    Vibrio, Yersinia enterocolitica, Enteroaggregative    Escherichia coli (EAEC), Enteropathogenic E.coli (EPEC),    Enterotoxigenic E. coli (ETEC) lt/st, Shiga-like    toxin-producing E. coli (STEC) stx1/stx2,    Shigella/ Enteroinvasive E. coli (EIEC), Cryptosporidium,    Cyclospora cayetanensis, Entamoeba histolytica,    Giardia lamblia, Adenovirus F 40/41, Astrovirus,    Norovirus GI/GII, Rotavirus A, Sapovirus    MEDICATIONS  (STANDING):  aspirin enteric coated 81 milliGRAM(s) Oral daily  atorvastatin Oral Tab/Cap - Peds 40 milliGRAM(s) Oral daily  carvedilol 3.125 milliGRAM(s) Oral every 12 hours  dextrose 40% Gel 15 Gram(s) Oral once  dextrose 40% Gel 15 Gram(s) Oral once  dextrose 50% Injectable 25 Gram(s) IV Push once  dextrose 50% Injectable 12.5 Gram(s) IV Push once  dextrose 50% Injectable 25 Gram(s) IV Push once  dextrose 50% Injectable 25 Gram(s) IV Push once  dextrose 50% Injectable 12.5 Gram(s) IV Push once  dextrose 50% Injectable 25 Gram(s) IV Push once  gabapentin 100 milliGRAM(s) Oral three times a day  glucagon  Injectable 1 milliGRAM(s) IntraMuscular once  glucagon  Injectable 1 milliGRAM(s) IntraMuscular once  heparin   Injectable 5000 Unit(s) SubCutaneous every 12 hours  insulin lispro (ADMELOG) corrective regimen sliding scale   SubCutaneous Before meals and at bedtime  levETIRAcetam 750 milliGRAM(s) Oral two times a day  piperacillin/tazobactam IVPB.. 3.375 Gram(s) IV Intermittent every 8 hours  sodium chloride 0.9% lock flush 3 milliLiter(s) IV Push every 8 hours  ticagrelor 90 milliGRAM(s) Oral every 12 hours    MEDICATIONS  (PRN):  ondansetron Injectable 4 milliGRAM(s) IV Push every 6 hours PRN Nausea and/or Vomiting      ASSESSMENT / PLAN:  ----------------------------------------  66y male with history of htn, CAD s/p stent x1, CHF, MI, DM on Basalgar and Novolog who presents with 2 days of nausea, vomiting, and diarrhea. Patient states that he developed cough several days ago and felt unwell. He stopped taking his insulin sec to diarrhea, N/V. Unvaccinated. Treated by MICU. AG closed. D/G. still with MA and MING. Incidental Parainfluenza +. Pancolitis on CT.    Diabetes / Diabetic ketoacidosis - Anion gap resolved and insulin infusion previously discontinued. On subcutaneous insulin coverage, monitoring glucose levels.    Pancolitis / Nausea - CT abdomen results noted. Empiric antibiotics were initiated in the ICU, day #4. Blood cultures were without growth. GI PCR results to be reviewed when available.    Acute kidney injury - On intravenous fluids with improvement in renal function.    Parainfluenza - No respiratory symptoms. Supportive care.    Coronary artery disease - On aspirin, ticagrelor, atorvastatin, and carvedilol. SHASTA MCKINLEY  ----------------------------------------  The patient was seen at bedside. Patient with diabetic ketoacidosis and colitis. Had some diabetes this morning. Tolerated oral intake.    Vital Signs Last 24 Hrs  T(C): 36.7 (20 Dec 2021 05:30), Max: 36.8 (19 Dec 2021 16:20)  T(F): 98 (20 Dec 2021 05:30), Max: 98.2 (19 Dec 2021 16:20)  HR: 79 (20 Dec 2021 05:30) (76 - 87)  BP: 174/86 (20 Dec 2021 05:30) (136/88 - 178/78)  BP(mean): --  RR: 18 (20 Dec 2021 05:30) (18 - 18)  SpO2: 96% (20 Dec 2021 05:30) (95% - 98%)    CAPILLARY BLOOD GLUCOSE  POCT Blood Glucose.: 125 mg/dL (20 Dec 2021 10:04)  POCT Blood Glucose.: 83 mg/dL (20 Dec 2021 09:06)  POCT Blood Glucose.: 51 mg/dL (20 Dec 2021 08:24)  POCT Blood Glucose.: 52 mg/dL (20 Dec 2021 08:17)  POCT Blood Glucose.: 125 mg/dL (19 Dec 2021 21:12)  POCT Blood Glucose.: 77 mg/dL (19 Dec 2021 17:19)  POCT Blood Glucose.: 75 mg/dL (19 Dec 2021 17:17)  POCT Blood Glucose.: 116 mg/dL (19 Dec 2021 11:59)    PHYSICAL EXAMINATION:  ----------------------------------------  General appearance: No acute distress, Awake, Alert  HEENT: Normocephalic, Atraumatic, Conjunctiva clear, EOMI  Neck: Supple, No JVD, No tenderness  Lungs: Breath sound equal bilaterally, No wheezes, No rales  Cardiovascular: S1S2, Regular rhythm  Abdomen: Soft, Nontender, Nondistended, No guarding/rebound, Positive bowel sounds  Extremities: No clubbing, No cyanosis, No edema, No calf tenderness  Neuro: Strength equal bilaterally, No tremors  Psychiatric: Appropriate mood, Normal affect    LABORATORY STUDIES:  ----------------------------------------             9.7    8.50  )-----------( 120      ( 19 Dec 2021 07:32 )             28.3     12-19    138  |  105  |  29.2<H>  ----------------------------<  104<H>  3.6   |  20.0<L>  |  1.21    Ca    7.6<L>      19 Dec 2021 07:32    GI PCR Panel, Stool (collected 19 Dec 2021 16:34)  Source: .Stool Feces  Final Report (19 Dec 2021 18:54):    GI PCR Results: NOT detected    *******Please Note:*******    GI panel PCR evaluates for:    Campylobacter, Plesiomonas shigelloides, Salmonella,    Vibrio, Yersinia enterocolitica, Enteroaggregative    Escherichia coli (EAEC), Enteropathogenic E.coli (EPEC),    Enterotoxigenic E. coli (ETEC) lt/st, Shiga-like    toxin-producing E. coli (STEC) stx1/stx2,    Shigella/ Enteroinvasive E. coli (EIEC), Cryptosporidium,    Cyclospora cayetanensis, Entamoeba histolytica,    Giardia lamblia, Adenovirus F 40/41, Astrovirus,    Norovirus GI/GII, Rotavirus A, Sapovirus    MEDICATIONS  (STANDING):  aspirin enteric coated 81 milliGRAM(s) Oral daily  atorvastatin Oral Tab/Cap - Peds 40 milliGRAM(s) Oral daily  carvedilol 3.125 milliGRAM(s) Oral every 12 hours  dextrose 40% Gel 15 Gram(s) Oral once  dextrose 40% Gel 15 Gram(s) Oral once  dextrose 50% Injectable 25 Gram(s) IV Push once  dextrose 50% Injectable 12.5 Gram(s) IV Push once  dextrose 50% Injectable 25 Gram(s) IV Push once  dextrose 50% Injectable 25 Gram(s) IV Push once  dextrose 50% Injectable 12.5 Gram(s) IV Push once  dextrose 50% Injectable 25 Gram(s) IV Push once  gabapentin 100 milliGRAM(s) Oral three times a day  glucagon  Injectable 1 milliGRAM(s) IntraMuscular once  glucagon  Injectable 1 milliGRAM(s) IntraMuscular once  heparin   Injectable 5000 Unit(s) SubCutaneous every 12 hours  insulin lispro (ADMELOG) corrective regimen sliding scale   SubCutaneous Before meals and at bedtime  levETIRAcetam 750 milliGRAM(s) Oral two times a day  piperacillin/tazobactam IVPB.. 3.375 Gram(s) IV Intermittent every 8 hours  sodium chloride 0.9% lock flush 3 milliLiter(s) IV Push every 8 hours  ticagrelor 90 milliGRAM(s) Oral every 12 hours    MEDICATIONS  (PRN):  ondansetron Injectable 4 milliGRAM(s) IV Push every 6 hours PRN Nausea and/or Vomiting      ASSESSMENT / PLAN:  ----------------------------------------  66y male with history of htn, CAD s/p stent x1, CHF, MI, DM on Basalgar and Novolog who presents with 2 days of nausea, vomiting, and diarrhea. Patient states that he developed cough several days ago and felt unwell. He stopped taking his insulin sec to diarrhea, N/V. Unvaccinated. Treated by MICU. AG closed. D/G. still with MA and MING. Incidental Parainfluenza +. Pancolitis on CT.    Diabetes / Diabetic ketoacidosis - Anion gap resolved and insulin infusion previously discontinued. On subcutaneous insulin coverage, monitoring glucose levels.    Pancolitis / Nausea - CT abdomen results noted. Empiric antibiotics were initiated in the ICU, day #4. Blood cultures were without growth. GI PCR was negative.    Acute kidney injury - Renal function improved with intravenous fluids. Tolerating oral intake.    Parainfluenza - No respiratory symptoms. Supportive care.    Coronary artery disease - On aspirin, ticagrelor, atorvastatin, and carvedilol.

## 2021-12-20 NOTE — PROVIDER CONTACT NOTE (HYPOGLYCEMIA EVENT) - NS PROVIDER CONTACT ASSESS-HYPO
A&Ox4, sitting upright in bed. Denies any nausea, dizziness, diaphoresis or faintness. Breakfast tray at bedside, pt ready to eat

## 2021-12-20 NOTE — DISCHARGE NOTE NURSING/CASE MANAGEMENT/SOCIAL WORK - NSDCPEFALRISK_GEN_ALL_CORE
For information on Fall & Injury Prevention, visit: [email or web address] OR  [email or web address] OR  https://www.cdc.gov/steadi/patient.html

## 2021-12-20 NOTE — PROVIDER CONTACT NOTE (HYPOGLYCEMIA EVENT) - NS PROVIDER CONTACT RECOMMEND-HYPO
eat full breakfast tray, give extra juice.  Hold AM Lantus. Re-check sugar after pt finishes eating/drinking.

## 2021-12-20 NOTE — DISCHARGE NOTE PROVIDER - HOSPITAL COURSE
66M presented with a two day history of nausea, vomiting, and diarrhea. He reported that he had stopped taking his insulin due to the diarrhea. On presentation, Bicarb(18), BUN/Cr(36.8/1.15). Respiratory viral panel was positive for parainfluenza. CT of the abdomen noted small bilateral pleural effusions, basilar atelectasis, enlarged prostate, no bowel obstruction, diffuse colonic wall thickening/inflammation, no ascites. The patient was admitted to the intensive care unit for diabetic ketoacidosis with initiation of intravenous insulin and fluids. Empiric antibiotics were initiated for colitis. Repeat studies noted resolution of the anion gap but also acute kidney injury. The patient was transferred to the Medical service for further management. Blood cultures were without growth. Antiemetic medications were initiated for nausea. Premeal insulin was held due to poor oral intake and low glucose levels. Blood cultures were without growth. The patient had improvement in the nausea and was tolerant of oral intake. Repeat studies noted improvement in the renal function. GI PCR was negative. The patient was discharged home with instructions to follow up with his primary care physician for further management.      36 minutes total time

## 2021-12-20 NOTE — PROVIDER CONTACT NOTE (HYPOGLYCEMIA EVENT) - NS PROVIDER CONTACT BACKGROUND-HYPO
Age: 66y    Gender: Male    POCT Blood Glucose:  51 mg/dL (12-20-21 @ 08:24)  52 mg/dL (12-20-21 @ 08:17)  125 mg/dL (12-19-21 @ 21:12)  77 mg/dL (12-19-21 @ 17:19)  75 mg/dL (12-19-21 @ 17:17)  116 mg/dL (12-19-21 @ 11:59)      eMAR:atorvastatin Oral Tab/Cap - Peds   40 milliGRAM(s) Oral (12-19-21 @ 21:55)    insulin glargine Injectable (LANTUS)   20 Unit(s) SubCutaneous (12-19-21 @ 09:14)

## 2021-12-20 NOTE — DISCHARGE NOTE NURSING/CASE MANAGEMENT/SOCIAL WORK - PATIENT PORTAL LINK FT
You can access the FollowMyHealth Patient Portal offered by [facility] by registering at the following website: [email or web address]. By joining [employer]’s FollowMyHealth portal, you will also be able to view your health information using other applications (apps) compatible with our system.

## 2021-12-20 NOTE — PROGRESS NOTE ADULT - SUBJECTIVE AND OBJECTIVE BOX
CC: Follow up T2DM management     INTERVAL HPI/OVERNIGHT EVENTS:  No acute events, pt denies discomfort    ROS: Patient denies chest pain, SOB, abd pain, N/V, or any other complaints. All remainder ROS negative.    MEDICATIONS  (STANDING):  aspirin enteric coated 81 milliGRAM(s) Oral daily  atorvastatin Oral Tab/Cap - Peds 40 milliGRAM(s) Oral daily  carvedilol 3.125 milliGRAM(s) Oral every 12 hours  dextrose 40% Gel 15 Gram(s) Oral once  dextrose 40% Gel 15 Gram(s) Oral once  dextrose 50% Injectable 25 Gram(s) IV Push once  dextrose 50% Injectable 12.5 Gram(s) IV Push once  dextrose 50% Injectable 25 Gram(s) IV Push once  dextrose 50% Injectable 25 Gram(s) IV Push once  dextrose 50% Injectable 12.5 Gram(s) IV Push once  dextrose 50% Injectable 25 Gram(s) IV Push once  gabapentin 100 milliGRAM(s) Oral three times a day  glucagon  Injectable 1 milliGRAM(s) IntraMuscular once  glucagon  Injectable 1 milliGRAM(s) IntraMuscular once  heparin   Injectable 5000 Unit(s) SubCutaneous every 12 hours  insulin lispro (ADMELOG) corrective regimen sliding scale   SubCutaneous Before meals and at bedtime  lactated ringers. 1000 milliLiter(s) (100 mL/Hr) IV Continuous <Continuous>  levETIRAcetam 750 milliGRAM(s) Oral two times a day  piperacillin/tazobactam IVPB.. 3.375 Gram(s) IV Intermittent every 8 hours  sodium chloride 0.9% lock flush 3 milliLiter(s) IV Push every 8 hours  ticagrelor 90 milliGRAM(s) Oral every 12 hours    MEDICATIONS  (PRN):  ondansetron Injectable 4 milliGRAM(s) IV Push every 6 hours PRN Nausea and/or Vomiting    Allergies  Toprol-XL (Faint; Other)    Vital Signs Last 24 Hrs  T(C): 36.7 (20 Dec 2021 05:30), Max: 36.8 (19 Dec 2021 16:20)  T(F): 98 (20 Dec 2021 05:30), Max: 98.2 (19 Dec 2021 16:20)  HR: 79 (20 Dec 2021 05:30) (76 - 87)  BP: 174/86 (20 Dec 2021 05:30) (136/88 - 178/78)  BP(mean): --  RR: 18 (20 Dec 2021 05:30) (18 - 18)  SpO2: 96% (20 Dec 2021 05:30) (95% - 98%)    PHYSICAL EXAM:  General: No apparent distress  Neck: Supple, trachea midline, no thyromegaly  Respiratory: Lungs clear bilaterally, normal rate, effort  Cardiac: +S1, S2, no m/r/g  GI: +BS, soft, non tender, non distended  Extremities: No peripheral edema, no pedal lesions  Neuro: A+O X3, no tremor  Pysch: Normal mood, normal affect  Skin: No rashes, acanthosis    LABS:                        9.7    8.50  )-----------( 120      ( 19 Dec 2021 07:32 )             28.3     12-19    138  |  105  |  29.2<H>  ----------------------------<  104<H>  3.6   |  20.0<L>  |  1.21    Ca    7.6<L>      19 Dec 2021 07:32      POCT Blood Glucose.: 83 mg/dL (12-20-21 @ 09:06)  POCT Blood Glucose.: 51 mg/dL (12-20-21 @ 08:24)  POCT Blood Glucose.: 52 mg/dL (12-20-21 @ 08:17)  POCT Blood Glucose.: 125 mg/dL (12-19-21 @ 21:12)  POCT Blood Glucose.: 77 mg/dL (12-19-21 @ 17:19)  POCT Blood Glucose.: 75 mg/dL (12-19-21 @ 17:17)  POCT Blood Glucose.: 116 mg/dL (12-19-21 @ 11:59)     CC: Follow up T2DM management     INTERVAL HPI/OVERNIGHT EVENTS:  No acute events, pt denies discomfort    ROS: Patient denies chest pain, SOB, abd pain, N/V, or any other complaints. All remainder ROS negative.    MEDICATIONS  (STANDING):  aspirin enteric coated 81 milliGRAM(s) Oral daily  atorvastatin Oral Tab/Cap - Peds 40 milliGRAM(s) Oral daily  carvedilol 3.125 milliGRAM(s) Oral every 12 hours  dextrose 40% Gel 15 Gram(s) Oral once  dextrose 40% Gel 15 Gram(s) Oral once  dextrose 50% Injectable 25 Gram(s) IV Push once  dextrose 50% Injectable 12.5 Gram(s) IV Push once  dextrose 50% Injectable 25 Gram(s) IV Push once  dextrose 50% Injectable 25 Gram(s) IV Push once  dextrose 50% Injectable 12.5 Gram(s) IV Push once  dextrose 50% Injectable 25 Gram(s) IV Push once  gabapentin 100 milliGRAM(s) Oral three times a day  glucagon  Injectable 1 milliGRAM(s) IntraMuscular once  glucagon  Injectable 1 milliGRAM(s) IntraMuscular once  heparin   Injectable 5000 Unit(s) SubCutaneous every 12 hours  insulin lispro (ADMELOG) corrective regimen sliding scale   SubCutaneous Before meals and at bedtime  lactated ringers. 1000 milliLiter(s) (100 mL/Hr) IV Continuous <Continuous>  levETIRAcetam 750 milliGRAM(s) Oral two times a day  piperacillin/tazobactam IVPB.. 3.375 Gram(s) IV Intermittent every 8 hours  sodium chloride 0.9% lock flush 3 milliLiter(s) IV Push every 8 hours  ticagrelor 90 milliGRAM(s) Oral every 12 hours    MEDICATIONS  (PRN):  ondansetron Injectable 4 milliGRAM(s) IV Push every 6 hours PRN Nausea and/or Vomiting    Allergies  Toprol-XL (Faint; Other)    Vital Signs Last 24 Hrs  T(C): 36.7 (20 Dec 2021 05:30), Max: 36.8 (19 Dec 2021 16:20)  T(F): 98 (20 Dec 2021 05:30), Max: 98.2 (19 Dec 2021 16:20)  HR: 79 (20 Dec 2021 05:30) (76 - 87)  BP: 174/86 (20 Dec 2021 05:30) (136/88 - 178/78)  BP(mean): --  RR: 18 (20 Dec 2021 05:30) (18 - 18)  SpO2: 96% (20 Dec 2021 05:30) (95% - 98%)    PHYSICAL EXAM:  General: No apparent distress  Respiratory: Lungs clear bilaterally, normal rate, effort  Cardiac: +S1, S2, no m/r/g  GI: +BS, soft, non tender, non distended  Extremities: No peripheral edema  Neuro: A+O X3, no tremor  Pysch: Normal mood, normal affect  Skin: No rashes, acanthosis    LABS:                        9.7    8.50  )-----------( 120      ( 19 Dec 2021 07:32 )             28.3     12-19    138  |  105  |  29.2<H>  ----------------------------<  104<H>  3.6   |  20.0<L>  |  1.21    Ca    7.6<L>      19 Dec 2021 07:32      POCT Blood Glucose.: 83 mg/dL (12-20-21 @ 09:06)  POCT Blood Glucose.: 51 mg/dL (12-20-21 @ 08:24)  POCT Blood Glucose.: 52 mg/dL (12-20-21 @ 08:17)  POCT Blood Glucose.: 125 mg/dL (12-19-21 @ 21:12)  POCT Blood Glucose.: 77 mg/dL (12-19-21 @ 17:19)  POCT Blood Glucose.: 75 mg/dL (12-19-21 @ 17:17)  POCT Blood Glucose.: 116 mg/dL (12-19-21 @ 11:59)

## 2021-12-20 NOTE — PROGRESS NOTE ADULT - ASSESSMENT
65 yo male hx of DM presents w cough for past several days, nausea and vomiting this am. Found to be in DKA in the ED.   - Of note, pt home regimen for diabetes is Novolog 15 units TID and Basaglar 25 units qhs.    1. T2DM - A1C 12%  - Hypoglycemic this morning, FS 52  - Poor appetite, encouraged PO intake  - Continue SSI  - Will continue to monitor sugars    2. CAD  - Continue aspirin, ticagrelor, atorvastatin, and carvedilol    3. Pancolitis  - Continue zosyn 67 yo male hx of T2DM and CAD presents w cough for past several days, nausea and vomiting this am. Found to be in DKA in the ED.   - Of note, pt home regimen for diabetes is Novolog 15 units TID and Basaglar 25 units qhs.    1. T2DM complicated by CAD - A1C 12%  - Hypoglycemic this morning, FS 52 - improved with treatment  - Poor appetite, encouraged PO intake  - Continue SSI  - Will continue to monitor sugars    2. CAD  - Continue aspirin, ticagrelor, atorvastatin, and carvedilol    3. Pancolitis  - Continue zosyn

## 2021-12-30 NOTE — ED ADULT NURSE REASSESSMENT NOTE - NS ED NURSE REASSESS COMMENT FT1
Received report from ALMA Hernandez from critical. Pt is A&OX4. Resp. even and unlabored. Denies any complaints. NSR on the CM. Pt drank 230mL of apple juice. Conversating properly. VS as noted. In NAD.

## 2021-12-30 NOTE — H&P ADULT - NSHPPHYSICALEXAM_GEN_ALL_CORE
Vital Signs Last 24 Hrs  T(C): 36.5 (12-30-21 @ 15:43), Max: 36.5 (12-30-21 @ 15:43)  T(F): 97.7 (12-30-21 @ 15:43), Max: 97.7 (12-30-21 @ 15:43)  HR: 97 (12-30-21 @ 15:43) (76 - 97)  BP: 159/88 (12-30-21 @ 15:43) (131/74 - 159/88)  BP(mean): --  RR: 18 (12-30-21 @ 15:43) (18 - 22)  SpO2: 97% (12-30-21 @ 15:43) (97% - 100%)    GENERAL: comfortable, conversing, slightly slow to respond ?? baseline  HEAD:  Atraumatic, Normocephalic  EYES: EOMI, PERRLA, conjunctiva and sclera clear  NECK: Supple, No JVD, Normal thyroid  NERVOUS SYSTEM:  Alert & Oriented X 2-3, Motor Strength 5/5 B/L upper and lower extremities  CHEST/LUNG: CTA bilaterally; No rales, rhonchi, wheezing, or rubs  HEART: Regular rate and rhythm; No murmurs, rubs, or gallops  ABDOMEN: Soft, Nontender, Nondistended; Bowel sounds present  EXTREMITIES:  2+ Peripheral Pulses, No clubbing, cyanosis, or edema  SKIN: No rashes or lesions

## 2021-12-30 NOTE — ED PROVIDER NOTE - CARE PLAN
1 Principal Discharge DX:	Hypoglycemia  Secondary Diagnosis:	2019 novel coronavirus disease (COVID-19)

## 2021-12-30 NOTE — ED PROVIDER NOTE - CLINICAL SUMMARY MEDICAL DECISION MAKING FREE TEXT BOX
seizure secondary to hypoglycemia   likely secondary o covid seizure secondary to hypoglycemia   likely secondary o covid  admit

## 2021-12-30 NOTE — ED ADULT NURSE REASSESSMENT NOTE - NS ED NURSE REASSESS COMMENT FT1
assumed care from Rn. Patient AXOX4. Cardiac monitor in place. HR 93 NSR, CPOX @ 96%. B/L 18g in right arm.

## 2021-12-30 NOTE — ED ADULT NURSE NOTE - NSIMPLEMENTINTERV_GEN_ALL_ED
Implemented All Fall with Harm Risk Interventions:  [place] to call system. Call bell, personal items and telephone within reach. Instruct patient to call for assistance. Room bathroom lighting operational. Non-slip footwear when patient is off stretcher. Physically safe environment: no spills, clutter or unnecessary equipment. Stretcher in lowest position, wheels locked, appropriate side rails in place. Provide visual cue, wrist band, yellow gown, etc. Monitor gait and stability. Monitor for mental status changes and reorient to person, place, and time. Review medications for side effects contributing to fall risk. Reinforce activity limits and safety measures with patient and family. Provide visual clues: red socks.

## 2021-12-30 NOTE — H&P ADULT - HISTORY OF PRESENT ILLNESS
66y male with history of HTN, CAD s/p stent x1, CHF, MI, DM on Basaglar and Novolog, PVD, Epilepsy presented to ED with c/o per family- AM glucose was 40 after which they gave him BF to eat and after that his glucose was 57. Then he had witnessed Sz. EMS witnessed second seizure and gave Versed and D 50 x 1.   Per ED Pt awake and talking  but not completely 'alert'. Incidental finding of COVID +.  He is unvaccinated.    SH- Denies habits  FH- no pertinent contributory medical history in family

## 2021-12-30 NOTE — ED PROVIDER NOTE - CADM POA CENTRAL LINE
Patient Education     Breast Health: Breast Self-Awareness  What is breast self-awareness?  Breast self-awareness is knowing how your breasts normally look and feel. Your breasts change as you go through different stages of your life. So it s important to learn what is normal for your breasts. Knowing about your breasts helps you spot any changes in them right away. Tell your healthcare provider about any changes.  Why is breast self-awareness important?  Many experts now say that women should focus on breast self-awareness instead of doing a breast self-examination (BSE). These experts include the American Cancer Society and the American Congress of Obstetricians and Gynecologists. Some experts even advise not teaching women to do a BSE. That s because research hasn t shown a clear benefit to doing BSEs.  Breast self-awareness is different than a BSE. It isn t about following a certain method and schedule. It s about knowing what's normal for your breasts. That way you can spot even small changes right away. If you see any changes, tell your healthcare provider.  Changes to look for  Call your healthcare provider if you find any changes in your breasts that worry you. These changes may be:    A lump    Nipple discharge other than breastmilk, especially if it's bloody    Swelling    A change in size or shape    Skin changes, such as redness, thickening, or dimpling of the skin    Swollen lymph nodes in the armpit    Nipple problems, such as pain or redness  If you find a lump  Call your provider if you find lumpiness in one breast. Also call if you feel something different in the tissue or feel a definite lump. Sometimes lumpiness may be due to menstrual changes. But there may be reason for concern.  Your provider may want to see you right away if you have:    Nipple discharge that is bloody    Skin changes on your breast, such as dimpling or puckering  It s okay to be upset if you find a lump. Be sure to call  your provider right away. Remember that most breast lumps are benign. This means they are not cancer.  [employer] last reviewed this educational content on 8/1/2017 2000-2020 [email or web address]. [address] [place] [address], [place] [address]. All rights reserved. This information is not intended as a substitute for professional medical care. Always follow your healthcare professional's instructions.           Patient Education     The Range of Pap Test Results  When your Pap test is sent to the lab, the lab studies your cell samples and reports any abnormal cell changes. Your healthcare provider can discuss these changes with you. In some cases, an abnormal Pap test is due to an infection. More serious cell changes range from dysplasia to cancer. Talk to your healthcare provider about your Pap test.    Normal results  Cervical cells, even normal ones, are always changing. As they mature, normal squamous cells move from deeper layers within the cervix. Over time, these cells flatten and cover the surface of the cervix. Within the cervical canal, the cells are different. These glandular cells are taller and not as flat as the cells on the surface of the cervix. When a Pap test sample shows healthy cells of both types, the results are negative. Keep having Pap tests as often as directed.  Abnormal results  A positive Pap test result means some cells in the sample showed abnormal changes. These results are grouped by the type of cell change and the location, or extent, of the changes. Depending on the results, you may need further testing.    Inflammation. Noncancerous changes are present. They may be due to normal cell repair. Or, they may be caused by an infection, such as HPV or yeast. Further testing may be needed. (Also called reactive cellular changes.)    Atypical squamous cells. Test results are unclear. Cells on the surface of the cervix show changes, but their significance is not yet known. Testing for HPV  and other sexually transmitted infections (STIs) may be needed. Treatment may be required. (Reported as ASC-US or ASC-H.)    Atypical glandular cells. Cells lining the cervical canal show abnormal changes. Further testing is likely. You may also have treatment to destroy or remove problem cells. (Reported as AGC.)    Mild dysplasia. Cells show distinct changes. More testing or HPV typing may be done. You may also have treatment to destroy or remove problem cells. (Reported as low-grade ALEX or JOSE ARMANDO 1.)    Moderate to severe dysplasia. Cells show precancerous changes. Or, noninvasive cancer (carcinoma in situ) may be present. Treatment to destroy or remove problem cells is likely. (Reported as high-grade ALEX or JOSE ARMANDO 2 or JOSE ARMANDO 3.)    Cancer. Different types of cancer may be detected by your Pap test. More tests to assess the cancer's extent are likely. The type of treatment will depend on the test results and other factors, such as age and health history. (Reported as squamous cell carcinoma, endocervical adenocarcinoma in situ, or adenocarcinoma.)  [employer] last reviewed this educational content on 8/1/2017 2000-2020 [employer]. [address] [place] [id number]. All rights reserved. This information is not intended as a substitute for professional medical care. Always follow your healthcare professional's instructions.            No

## 2021-12-30 NOTE — H&P ADULT - ASSESSMENT
66y male with history of HTN, CAD s/p stent x1, CHF, MI, DM on Basaglar and Novolog, PVD, Epilepsy  came in with Hypoglycemia and Seizures. Incidental COVID +    # Hypoglycemia  Accuchecks Q 2 hr  x 24 hrs  Stepdown  Hypoglycemia protocol  PO diet  Endocrine consult    # Seizures  precipitated by hypoglycemia  on Keppra  will get Epilepsy consult  CTH non acute  Ammonia WNL    # COVID +  Incidental finding  unvaccinated  Isolation  asymptomatic    # AE combined CHF  CXR with effusions and pulm vasc congestion significant  start lasix IVP bid  known EF 20% and grade 3 diastolic dysfxn and mod pulm HTN    toprol listed as allergy; on entresto, asa, lasix, brilinta, spirono at home    # Normocytic Anemia  Iron studies in AM, Retic count    # HTN mildly uncontrolled  monitor with lasix    Lovenox

## 2021-12-30 NOTE — ED ADULT NURSE NOTE - HIV OFFER
Instructed patient/caregiver regarding signs and symptoms of infection./Keep the cast/splint/dressing clean and dry./Verbal/written post procedure instructions were given to patient/caregiver./Elevate the injured extremity as instructed./Instructed patient/caregiver to follow-up with primary care physician.
Unable to answer due to medical condition/unresponsive/etc...

## 2021-12-30 NOTE — ED PROVIDER NOTE - OBJECTIVE STATEMENT
65 y/o male this am was in [facility] asked family to check his sugar   FS 40 family gave sweets repeat FS 57   pt then seized no h/o seizure disorder   EMS witnessed another e seizure and posturing and   was given dextrose 67 y/o male this am was in usual state of health asked family to check his sugar   FS 40 family gave sweets repeat FS 57   pt then seized no h/o seizure disorder   EMS witnessed another e seizure and posturing and   was given dextrose and versed

## 2021-12-30 NOTE — ED ADULT NURSE NOTE - OBJECTIVE STATEMENT
BIBEMS unresponsive from home. EMS states family heard a "yell" and found patient unresponsive in the room and patient has been having "problems with diabetes". EMS states BG in field was 84, medicated with one amp of dextrose, patient was also having seizure like activity medicated with 5mg of versed. Unknown history of seizures. Hx of DM.

## 2021-12-30 NOTE — ED ADULT TRIAGE NOTE - CHIEF COMPLAINT QUOTE
Pt BIBA, per ems pt was found by family unresponsive after he yelled for help. Reportedly was having seizure like activity - rec'd 5mg versed by ems. Pt BS 84 for ems, was given an amp of D50 pta. Pt unresponsive in triage. Breathing even and unlabored. Brought directly to critical care for eval by MD and CC team.

## 2021-12-30 NOTE — ED ADULT TRIAGE NOTE - AS HEIGHT TYPE
Report called to EMIL Castellano in [facility]. Patient tolerated the procedure well. VSS. 0.5 mg versed and 50 mcg fentanyl for sedation. 50mg benadryl and 125mg solumedrol given prior to procedure for contrast allergy. 3 hours bedrest. Ambulate ant 1300     stated

## 2021-12-31 NOTE — PROGRESS NOTE ADULT - ASSESSMENT
65 y/o M w/ PMH of HTN, CAD (s/p PCI x 1stent), CHF, IDDM, PVD, epilepsy came in w/ hypoglycemia and seizures.  Incidentally found to have COVID+.      Seizure likely triggered by hypoglycemia  - Glucose levels stable   - Will change to q4h accuchecks   Hypoglycemia protocol  PO diet  Endocrine consult    precipitated by hypoglycemia  on Keppra  will get Epilepsy consult  CTH non acute  Ammonia WNL    # COVID +  Incidental finding  unvaccinated  Isolation  asymptomatic    # AE combined CHF  CXR with effusions and pulm vasc congestion significant  start lasix IVP bid  known EF 20% and grade 3 diastolic dysfxn and mod pulm HTN    toprol listed as allergy; on entresto, asa, lasix, brilinta, spirono at home    # Normocytic Anemia  Iron studies in AM, Retic count    # HTN mildly uncontrolled  monitor with lasix    Lovenox 67 y/o M w/ PMH of HTN, CAD (s/p PCI x 1stent), CHF, IDDM, PVD, epilepsy came in w/ hypoglycemia and seizures.  Incidentally found to have COVID+.      Seizure likely triggered by hypoglycemia  - Glucose levels stable   - Will change to q4h accuchecks   - Hypoglycemia protocol on board  - [facility] (-) for acute pathology   - Ammonia levels WNL  - On keppra  - vEEG in progress   - Epilepsy and endocrinology consulted      Acute decomponsated HFrEF / CAD s/p PCI (x1 stent)  - TTE 12/30/21 w/ LVEF 20-25%   - CXR on admission w/ effusions and significant pulm vasc congestion   - Maintain on IV lasix bid  - Monitor lytes and renal fxn while aggressively diuresed   - Daily weights, strict I/o's and fluid restrict   - Maintain K>4 and Mg>2  - Monitor on telemetry   - On entresto, ASA, brilinta, aldactone   - Not on toprol given allergy      Hypokalemia   - Supplemented  - Likely 2/2 ongoing aggressive diuresis   - Monitor closely and replete as needed       COVID +  - Incidental finding on admission  - Pt is unvaccinated  - Maintain on airborn isolation precautions  - Remains asymptomatic at this time      Normocytic Anemia  - Iron panel reviewed and started on po iron  - H/H stable and hemodynamically stable  - Monitor CBC and transfuse if Hb <8      HTN   - Resumed home medications   - On IV lasix for diuresis   - Titrate medications as needed       VTE ppx: Lovenox    Dispo: Pending clinical course.  vEEG ongoing.

## 2021-12-31 NOTE — PROGRESS NOTE ADULT - SUBJECTIVE AND OBJECTIVE BOX
[facility]                                     Neurology at [place]                                 Laura Harvey, & Bob                                  [address]                                        [place], [place], [address]                                             [phone]      CC/HPI:  seizure  67 yo man with medical conditions as outlined below who follows with Breanna Hunt and Dr. Celis as an outpatient for epilepsy and hx of stroke admitted for seizure this morning likely in the setting of hypoglycemia. He presented to [facility] ED 2/25 with new onset self limiting convulsive status epilepticus and has been on keppra 750mg bid since. He states he is compliant with his medications and has not had a seizure since. He does not recall what happened this morning, but now feels back to baseline. He was recently admitted for DKA. His blood sugar was 40 this morning. He then had a convulsive seizure at home and one witnessed by EMS. He was found to be COVID + in the ED, but denies feeling ill. (TK)    Interval HPI: no seizures    Review of systems (neurology): Denies headache or dizziness. Denies weakness/numbness.  Denies speech/language deficits. Denies diplopia/blurred vision.  Denies confusion    MEDICATIONS  (STANDING):  aspirin enteric coated 81 milliGRAM(s) Oral daily  dextrose 40% Gel 15 Gram(s) Oral once  dextrose 5%. 1000 milliLiter(s) (50 mL/Hr) IV Continuous <Continuous>  dextrose 5%. 1000 milliLiter(s) (100 mL/Hr) IV Continuous <Continuous>  dextrose 50% Injectable 25 Gram(s) IV Push once  dextrose 50% Injectable 12.5 Gram(s) IV Push once  dextrose 50% Injectable 25 Gram(s) IV Push once  enoxaparin Injectable 40 milliGRAM(s) SubCutaneous daily  ferrous    sulfate 325 milliGRAM(s) Oral daily  furosemide   Injectable 40 milliGRAM(s) IV Push two times a day  gabapentin 100 milliGRAM(s) Oral three times a day  glucagon  Injectable 1 milliGRAM(s) IntraMuscular once  insulin lispro (ADMELOG) corrective regimen sliding scale   SubCutaneous at bedtime  levETIRAcetam 750 milliGRAM(s) Oral two times a day  sacubitril 49 mG/valsartan 51 mG 1 Tablet(s) Oral two times a day  spironolactone 25 milliGRAM(s) Oral daily  ticagrelor 90 milliGRAM(s) Oral daily    MEDICATIONS  (PRN):      Vital Signs Last 24 Hrs  T(C): 37 (31 Dec 2021 08:10), Max: 37 (31 Dec 2021 08:10)  T(F): 98.6 (31 Dec 2021 08:10), Max: 98.6 (31 Dec 2021 08:10)  HR: 86 (31 Dec 2021 08:10) (86 - 97)  BP: 138/82 (31 Dec 2021 08:10) (130/70 - 171/82)  BP(mean): 84 (31 Dec 2021 04:00) (84 - 84)  RR: 17 (31 Dec 2021 08:10) (14 - 18)  SpO2: 98% (31 Dec 2021 08:10) (97% - 98%)    Detailed Neurologic Exam:    Mental status: The patient is awake and alert and has normal attention span.  The patient is fully oriented in 3 spheres. The patient is oriented to current events. The patient is able to name objects, follow commands, repeat sentences.    Cranial nerves: . Pupils equal and react symmetrically to light. There is no visual field deficit to confrontation. Extraocular motion is full with no nystagmus. There is no ptosis. Facial sensation is intact. Facial musculature is symmetric. Palate elevates symmetrically.  Tongue is midline.    Motor: There is normal bulk and tone.  There is no tremor.  Strength is 5/5 in the right arm and leg.   Strength is 5/5 in the left arm and leg.    Sensation: Intact to light touch and pin in 4 extremities    Reflexes: 2+ throughout and plantar responses are flexor.    Cerebellar: There is no dysmetria on finger to nose testing.    Gait : deferred    Labs:                          9.1    7.65  )-----------( 178      ( 30 Dec 2021 12:16 )             27.9     12-31    142  |  104  |  11.1  ----------------------------<  78  3.4<L>   |  25.0  |  0.87    Ca    7.9<L>      31 Dec 2021 09:55  Phos  3.1     12-31  Mg     1.6     12-31    TPro  5.3<L>  /  Alb  2.4<L>  /  TBili  0.3<L>  /  DBili  x   /  AST  32  /  ALT  16  /  AlkPhos  67  12-30    LIVER FUNCTIONS - ( 30 Dec 2021 12:16 )  Alb: 2.4 g/dL / Pro: 5.3 g/dL / ALK PHOS: 67 U/L / ALT: 16 U/L / AST: 32 U/L / GGT: x           PT/INR - ( 30 Dec 2021 12:16 )   PT: 13.4 sec;   INR: 1.16 ratio         PTT - ( 30 Dec 2021 12:16 )  PTT:30.9 sec    Radiology (studies independently reviewed unless otherwise noted):  Head CT 12/30 showed old right frontal stroke, no acute CVA. mass or blood

## 2021-12-31 NOTE — PROGRESS NOTE ADULT - SUBJECTIVE AND OBJECTIVE BOX
Chief Complaint:  covid and seizures    SUBJECTIVE / OVERNIGHT EVENTS:  No acute events reported overnight.  Pt undergoing vEEG.  He offers no acute complaints at this time.  Patient denies chest pain, SOB, abd pain, N/V, fever, chills, dysuria or any other complaints. All remainder ROS negative.       I&O's Summary    30 Dec 2021 07:01  -  31 Dec 2021 07:00  --------------------------------------------------------  IN: 0 mL / OUT: 400 mL / NET: -400 mL    31 Dec 2021 07:01  -  31 Dec 2021 12:29  --------------------------------------------------------  IN: 350 mL / OUT: 0 mL / NET: 350 mL          PHYSICAL EXAM:  Vital Signs Last 24 Hrs  T(C): 37 (31 Dec 2021 08:10), Max: 37 (31 Dec 2021 08:10)  T(F): 98.6 (31 Dec 2021 08:10), Max: 98.6 (31 Dec 2021 08:10)  HR: 86 (31 Dec 2021 08:10) (76 - 97)  BP: 138/82 (31 Dec 2021 08:10) (130/70 - 171/82)  BP(mean): 84 (31 Dec 2021 04:00) (84 - 84)  RR: 17 (31 Dec 2021 08:10) (14 - 20)  SpO2: 98% (31 Dec 2021 08:10) (97% - 99%)      GENERAL: pt examined bedside, laying comfortably in bed in NAD  HEENT: NC/AT, moist oral mucosa, clear conjunctiva, sclera nonicteric  RESPIRATORY: Normal respiratory effort; CTA b/l, no wheezing, rhonchi, rales  CARDIOVASCULAR: RRR, normal S1 and S2  ABDOMEN: soft, NT/ND, normoactive bowel sounds, no rebound/guarding  EXTREMITIES: No cynaosis, no clubbing, no lower extremity edema; Peripheral pulses are 2+ bilaterally  PSYCH: affect appropriate and cooperative  NEUROLOGY: A+O to person, place, and time, no focal neurologic deficits appreciated   SKIN: No rashes or no palpable lesions        LABS:                        9.1    7.65  )-----------( 178      ( 30 Dec 2021 12:16 )             27.9     12-31    142  |  104  |  11.1  ----------------------------<  78  3.4<L>   |  25.0  |  0.87    Ca    7.9<L>      31 Dec 2021 09:55  Phos  3.1     12-31  Mg     1.6     12-31    TPro  5.3<L>  /  Alb  2.4<L>  /  TBili  0.3<L>  /  DBili  x   /  AST  32  /  ALT  16  /  AlkPhos  67  12-30    PT/INR - ( 30 Dec 2021 12:16 )   PT: 13.4 sec;   INR: 1.16 ratio         PTT - ( 30 Dec 2021 12:16 )  PTT:30.9 sec          CAPILLARY BLOOD GLUCOSE      POCT Blood Glucose.: 105 mg/dL (31 Dec 2021 11:35)  POCT Blood Glucose.: 86 mg/dL (31 Dec 2021 08:14)  POCT Blood Glucose.: 115 mg/dL (31 Dec 2021 04:05)  POCT Blood Glucose.: 123 mg/dL (31 Dec 2021 01:56)  POCT Blood Glucose.: 145 mg/dL (31 Dec 2021 00:20)  POCT Blood Glucose.: 137 mg/dL (30 Dec 2021 21:54)  POCT Blood Glucose.: 176 mg/dL (30 Dec 2021 19:01)  POCT Blood Glucose.: 171 mg/dL (30 Dec 2021 16:58)  POCT Blood Glucose.: 155 mg/dL (30 Dec 2021 12:57)        RADIOLOGY & ADDITIONAL TESTS:          MEDICATIONS  (STANDING):  aspirin enteric coated 81 milliGRAM(s) Oral daily  dextrose 40% Gel 15 Gram(s) Oral once  dextrose 5%. 1000 milliLiter(s) (50 mL/Hr) IV Continuous <Continuous>  dextrose 5%. 1000 milliLiter(s) (100 mL/Hr) IV Continuous <Continuous>  dextrose 50% Injectable 25 Gram(s) IV Push once  dextrose 50% Injectable 12.5 Gram(s) IV Push once  dextrose 50% Injectable 25 Gram(s) IV Push once  enoxaparin Injectable 40 milliGRAM(s) SubCutaneous daily  furosemide   Injectable 40 milliGRAM(s) IV Push two times a day  gabapentin 100 milliGRAM(s) Oral three times a day  glucagon  Injectable 1 milliGRAM(s) IntraMuscular once  insulin lispro (ADMELOG) corrective regimen sliding scale   SubCutaneous at bedtime  levETIRAcetam 750 milliGRAM(s) Oral two times a day  sacubitril 49 mG/valsartan 51 mG 1 Tablet(s) Oral two times a day  spironolactone 25 milliGRAM(s) Oral daily  ticagrelor 90 milliGRAM(s) Oral daily    MEDICATIONS  (PRN):                                     Chief Complaint:  covid and seizures    SUBJECTIVE / OVERNIGHT EVENTS:  No acute events reported overnight.  Pt undergoing vEEG.  He offers no acute complaints at this time.  Patient denies chest pain, SOB, abd pain, N/V, fever, chills, dysuria or any other complaints. All remainder ROS negative.       I&O's Summary    30 Dec 2021 07:01  -  31 Dec 2021 07:00  --------------------------------------------------------  IN: 0 mL / OUT: 400 mL / NET: -400 mL    31 Dec 2021 07:01  -  31 Dec 2021 12:29  --------------------------------------------------------  IN: 350 mL / OUT: 0 mL / NET: 350 mL          PHYSICAL EXAM:  Vital Signs Last 24 Hrs  T(C): 37 (31 Dec 2021 08:10), Max: 37 (31 Dec 2021 08:10)  T(F): 98.6 (31 Dec 2021 08:10), Max: 98.6 (31 Dec 2021 08:10)  HR: 86 (31 Dec 2021 08:10) (76 - 97)  BP: 138/82 (31 Dec 2021 08:10) (130/70 - 171/82)  BP(mean): 84 (31 Dec 2021 04:00) (84 - 84)  RR: 17 (31 Dec 2021 08:10) (14 - 20)  SpO2: 98% (31 Dec 2021 08:10) (97% - 99%)      GENERAL: pt examined bedside, laying comfortably in bed in NAD  HEENT: NC/AT, moist oral mucosa, clear conjunctiva, sclera nonicteric  RESPIRATORY: Normal respiratory effort; no wheezing, rhonchi, rales  CARDIOVASCULAR: RRR, normal S1 and S2  ABDOMEN: soft, NT/ND, normoactive bowel sounds, no rebound/guarding  EXTREMITIES: No cynaosis, no clubbing, +LE edema; Peripheral pulses are 2+ bilaterally  PSYCH: affect appropriate and cooperative  NEUROLOGY: A+O to person, place, and time, no focal neurologic deficits appreciated   SKIN: No rashes or no palpable lesions        LABS:                        9.1    7.65  )-----------( 178      ( 30 Dec 2021 12:16 )             27.9     12-31    142  |  104  |  11.1  ----------------------------<  78  3.4<L>   |  25.0  |  0.87    Ca    7.9<L>      31 Dec 2021 09:55  Phos  3.1     12-31  Mg     1.6     12-31    TPro  5.3<L>  /  Alb  2.4<L>  /  TBili  0.3<L>  /  DBili  x   /  AST  32  /  ALT  16  /  AlkPhos  67  12-30    PT/INR - ( 30 Dec 2021 12:16 )   PT: 13.4 sec;   INR: 1.16 ratio         PTT - ( 30 Dec 2021 12:16 )  PTT:30.9 sec          CAPILLARY BLOOD GLUCOSE      POCT Blood Glucose.: 105 mg/dL (31 Dec 2021 11:35)  POCT Blood Glucose.: 86 mg/dL (31 Dec 2021 08:14)  POCT Blood Glucose.: 115 mg/dL (31 Dec 2021 04:05)  POCT Blood Glucose.: 123 mg/dL (31 Dec 2021 01:56)  POCT Blood Glucose.: 145 mg/dL (31 Dec 2021 00:20)  POCT Blood Glucose.: 137 mg/dL (30 Dec 2021 21:54)  POCT Blood Glucose.: 176 mg/dL (30 Dec 2021 19:01)  POCT Blood Glucose.: 171 mg/dL (30 Dec 2021 16:58)  POCT Blood Glucose.: 155 mg/dL (30 Dec 2021 12:57)        RADIOLOGY & ADDITIONAL TESTS:          MEDICATIONS  (STANDING):  aspirin enteric coated 81 milliGRAM(s) Oral daily  dextrose 40% Gel 15 Gram(s) Oral once  dextrose 5%. 1000 milliLiter(s) (50 mL/Hr) IV Continuous <Continuous>  dextrose 5%. 1000 milliLiter(s) (100 mL/Hr) IV Continuous <Continuous>  dextrose 50% Injectable 25 Gram(s) IV Push once  dextrose 50% Injectable 12.5 Gram(s) IV Push once  dextrose 50% Injectable 25 Gram(s) IV Push once  enoxaparin Injectable 40 milliGRAM(s) SubCutaneous daily  furosemide   Injectable 40 milliGRAM(s) IV Push two times a day  gabapentin 100 milliGRAM(s) Oral three times a day  glucagon  Injectable 1 milliGRAM(s) IntraMuscular once  insulin lispro (ADMELOG) corrective regimen sliding scale   SubCutaneous at bedtime  levETIRAcetam 750 milliGRAM(s) Oral two times a day  sacubitril 49 mG/valsartan 51 mG 1 Tablet(s) Oral two times a day  spironolactone 25 milliGRAM(s) Oral daily  ticagrelor 90 milliGRAM(s) Oral daily    MEDICATIONS  (PRN):

## 2021-12-31 NOTE — PROGRESS NOTE ADULT - ASSESSMENT
The patient is a 66y Male who is followed by neurology because of seizure    Seizure  breakthrough insetting of hypoglycemia  await cvEEG report  continue with kep[pra 750 mg po bid  will adjust as appropriate if any significant findings on EEG  If no significant findings on EEG he can be discharged when medically stable    Thank you for allowing me to participate in the care of your patient    Arcenio Sanchez MD, PhD   [id number]

## 2021-12-31 NOTE — CONSULT NOTE ADULT - ASSESSMENT
66 year old male with Type 2 DM complicated by neuropathy and retinopathy, CAD, CHF, seizure disorder admitted with seizure occurring in setting of hypoglycemic event which was likely the result of excessive dose of prandial insulin.  He was also found to be + for COVID-19.      1.  Type 2 DM/ Hypoglycemia-   he is likely on too much outpatient prandial insulin as his diet seems to have improved since his last admission.  Would continue to observe off standing prandial insulin and basal insulin for the time being and use low dose sliding scale as needed.  On discharge, would not resume prandial insulin, but could consider addition of DPPIV- inhibitor or SGLT2- inhibitor + low dose of basal insulin in order to reduce the hypoglycemic risk in this patient.    2.  Seizure-  likely from hypoglycemia.  Currently on Keppra and EEG is being done.   CT shows chronic CVAs, but no acute changes  3.  CHF-  admission CXR showed effusions and vascular congestion-  on IV lasix.   4.  COVID-19-  incidental finding.  No current symptoms.  Continue isolation precautions.

## 2021-12-31 NOTE — CHART NOTE - NSCHARTNOTEFT_GEN_A_CORE
Spoke with EEG at [place]  slowing in right, has only been on EEG for 3 hours    will continue EEG another 24 hours  continue keppra  750 bid    will follow with you    Arcenio Sanchez MD PhD   [phone]

## 2021-12-31 NOTE — PATIENT PROFILE ADULT - FALL HARM RISK - UNIVERSAL INTERVENTIONS
Bed in lowest position, wheels locked, appropriate side rails in place/Call bell, personal items and telephone in reach/Instruct patient to call for assistance before getting out of bed or chair/Non-slip footwear when patient is out of bed/[place] to call system/Physically safe environment - no spills, clutter or unnecessary equipment/Purposeful Proactive Rounding/Room/bathroom lighting operational, light cord in reach

## 2022-01-01 ENCOUNTER — RX RENEWAL (OUTPATIENT)
Age: 67
End: 2022-01-01

## 2022-01-01 ENCOUNTER — APPOINTMENT (OUTPATIENT)
Dept: NEUROLOGY | Facility: CLINIC | Age: 67
End: 2022-01-01

## 2022-01-01 ENCOUNTER — INPATIENT (INPATIENT)
Facility: HOSPITAL | Age: 67
LOS: 0 days | DRG: 208 | End: 2022-03-24
Attending: HOSPITALIST | Admitting: INTERNAL MEDICINE
Payer: MEDICARE

## 2022-01-01 ENCOUNTER — TRANSCRIPTION ENCOUNTER (OUTPATIENT)
Age: 67
End: 2022-01-01

## 2022-01-01 VITALS
SYSTOLIC BLOOD PRESSURE: 158 MMHG | RESPIRATION RATE: 18 BRPM | TEMPERATURE: 98 F | HEART RATE: 88 BPM | DIASTOLIC BLOOD PRESSURE: 87 MMHG | OXYGEN SATURATION: 100 %

## 2022-01-01 VITALS — RESPIRATION RATE: 21 BRPM | TEMPERATURE: 96 F

## 2022-01-01 VITALS — HEIGHT: 63 IN

## 2022-01-01 DIAGNOSIS — I46.9 CARDIAC ARREST, CAUSE UNSPECIFIED: ICD-10-CM

## 2022-01-01 DIAGNOSIS — Z95.1 PRESENCE OF AORTOCORONARY BYPASS GRAFT: Chronic | ICD-10-CM

## 2022-01-01 LAB
ALBUMIN SERPL ELPH-MCNC: 2.4 G/DL — LOW (ref 3.3–5.2)
ALBUMIN SERPL ELPH-MCNC: 2.6 G/DL — LOW (ref 3.3–5.2)
ALP SERPL-CCNC: 139 U/L — HIGH (ref 40–120)
ALP SERPL-CCNC: 77 U/L — SIGNIFICANT CHANGE UP (ref 40–120)
ALT FLD-CCNC: 15 U/L — SIGNIFICANT CHANGE UP
ALT FLD-CCNC: 57 U/L — HIGH
AMPHET UR-MCNC: NEGATIVE — SIGNIFICANT CHANGE UP
ANION GAP SERPL CALC-SCNC: 10 MMOL/L — SIGNIFICANT CHANGE UP (ref 5–17)
ANION GAP SERPL CALC-SCNC: 20 MMOL/L — HIGH (ref 5–17)
APPEARANCE UR: CLEAR — SIGNIFICANT CHANGE UP
APTT BLD: 37.5 SEC — HIGH (ref 27.5–35.5)
AST SERPL-CCNC: 29 U/L — SIGNIFICANT CHANGE UP
AST SERPL-CCNC: 93 U/L — HIGH
BACTERIA # UR AUTO: ABNORMAL
BARBITURATES UR SCN-MCNC: NEGATIVE — SIGNIFICANT CHANGE UP
BASOPHILS # BLD AUTO: 0.06 K/UL — SIGNIFICANT CHANGE UP (ref 0–0.2)
BASOPHILS # BLD AUTO: 0.12 K/UL — SIGNIFICANT CHANGE UP (ref 0–0.2)
BASOPHILS NFR BLD AUTO: 0.9 % — SIGNIFICANT CHANGE UP (ref 0–2)
BASOPHILS NFR BLD AUTO: 0.9 % — SIGNIFICANT CHANGE UP (ref 0–2)
BENZODIAZ UR-MCNC: NEGATIVE — SIGNIFICANT CHANGE UP
BILIRUB SERPL-MCNC: 0.3 MG/DL — LOW (ref 0.4–2)
BILIRUB SERPL-MCNC: <0.2 MG/DL — LOW (ref 0.4–2)
BILIRUB UR-MCNC: NEGATIVE — SIGNIFICANT CHANGE UP
BLD GP AB SCN SERPL QL: SIGNIFICANT CHANGE UP
BUN SERPL-MCNC: 14.1 MG/DL — SIGNIFICANT CHANGE UP (ref 8–20)
BUN SERPL-MCNC: 26.8 MG/DL — HIGH (ref 8–20)
CALCIUM SERPL-MCNC: 7.7 MG/DL — LOW (ref 8.6–10.2)
CALCIUM SERPL-MCNC: 7.9 MG/DL — LOW (ref 8.6–10.2)
CHLORIDE SERPL-SCNC: 104 MMOL/L — SIGNIFICANT CHANGE UP (ref 98–107)
CHLORIDE SERPL-SCNC: 108 MMOL/L — HIGH (ref 98–107)
CK MB CFR SERPL CALC: 4.1 NG/ML — SIGNIFICANT CHANGE UP (ref 0–6.7)
CK SERPL-CCNC: 564 U/L — HIGH (ref 30–200)
CO2 SERPL-SCNC: 17 MMOL/L — LOW (ref 22–29)
CO2 SERPL-SCNC: 24 MMOL/L — SIGNIFICANT CHANGE UP (ref 22–29)
COCAINE METAB.OTHER UR-MCNC: NEGATIVE — SIGNIFICANT CHANGE UP
COLOR SPEC: YELLOW — SIGNIFICANT CHANGE UP
CREAT SERPL-MCNC: 0.81 MG/DL — SIGNIFICANT CHANGE UP (ref 0.5–1.3)
CREAT SERPL-MCNC: 1.44 MG/DL — HIGH (ref 0.5–1.3)
CULTURE RESULTS: SIGNIFICANT CHANGE UP
CULTURE RESULTS: SIGNIFICANT CHANGE UP
DIFF PNL FLD: ABNORMAL
EGFR: 54 ML/MIN/1.73M2 — LOW
ELLIPTOCYTES BLD QL SMEAR: SLIGHT — SIGNIFICANT CHANGE UP
EOSINOPHIL # BLD AUTO: 0 K/UL — SIGNIFICANT CHANGE UP (ref 0–0.5)
EOSINOPHIL # BLD AUTO: 0.1 K/UL — SIGNIFICANT CHANGE UP (ref 0–0.5)
EOSINOPHIL NFR BLD AUTO: 0 % — SIGNIFICANT CHANGE UP (ref 0–6)
EOSINOPHIL NFR BLD AUTO: 1.5 % — SIGNIFICANT CHANGE UP (ref 0–6)
EPI CELLS # UR: SIGNIFICANT CHANGE UP
GAS PNL BLDA: SIGNIFICANT CHANGE UP
GIANT PLATELETS BLD QL SMEAR: PRESENT — SIGNIFICANT CHANGE UP
GLUCOSE BLDC GLUCOMTR-MCNC: 129 MG/DL — HIGH (ref 70–99)
GLUCOSE BLDC GLUCOMTR-MCNC: 129 MG/DL — HIGH (ref 70–99)
GLUCOSE BLDC GLUCOMTR-MCNC: 144 MG/DL — HIGH (ref 70–99)
GLUCOSE BLDC GLUCOMTR-MCNC: 153 MG/DL — HIGH (ref 70–99)
GLUCOSE BLDC GLUCOMTR-MCNC: 181 MG/DL — HIGH (ref 70–99)
GLUCOSE BLDC GLUCOMTR-MCNC: 185 MG/DL — HIGH (ref 70–99)
GLUCOSE SERPL-MCNC: 141 MG/DL — HIGH (ref 70–99)
GLUCOSE SERPL-MCNC: 316 MG/DL — HIGH (ref 70–99)
GLUCOSE UR QL: 100 MG/DL
HCT VFR BLD CALC: 29.1 % — LOW (ref 39–50)
HCT VFR BLD CALC: 31.4 % — LOW (ref 39–50)
HGB BLD-MCNC: 9.4 G/DL — LOW (ref 13–17)
HGB BLD-MCNC: 9.5 G/DL — LOW (ref 13–17)
IMM GRANULOCYTES NFR BLD AUTO: 0.2 % — SIGNIFICANT CHANGE UP (ref 0–1.5)
INR BLD: 1.32 RATIO — HIGH (ref 0.88–1.16)
KETONES UR-MCNC: NEGATIVE — SIGNIFICANT CHANGE UP
LEUKOCYTE ESTERASE UR-ACNC: NEGATIVE — SIGNIFICANT CHANGE UP
LYMPHOCYTES # BLD AUTO: 1.89 K/UL — SIGNIFICANT CHANGE UP (ref 1–3.3)
LYMPHOCYTES # BLD AUTO: 28.5 % — SIGNIFICANT CHANGE UP (ref 13–44)
LYMPHOCYTES # BLD AUTO: 52.2 % — HIGH (ref 13–44)
LYMPHOCYTES # BLD AUTO: 7 K/UL — HIGH (ref 1–3.3)
MAGNESIUM SERPL-MCNC: 2 MG/DL — SIGNIFICANT CHANGE UP (ref 1.6–2.6)
MAGNESIUM SERPL-MCNC: 2.1 MG/DL — SIGNIFICANT CHANGE UP (ref 1.6–2.6)
MANUAL SMEAR VERIFICATION: SIGNIFICANT CHANGE UP
MCHC RBC-ENTMCNC: 28.2 PG — SIGNIFICANT CHANGE UP (ref 27–34)
MCHC RBC-ENTMCNC: 28.3 PG — SIGNIFICANT CHANGE UP (ref 27–34)
MCHC RBC-ENTMCNC: 30.3 GM/DL — LOW (ref 32–36)
MCHC RBC-ENTMCNC: 32.3 GM/DL — SIGNIFICANT CHANGE UP (ref 32–36)
MCV RBC AUTO: 87.7 FL — SIGNIFICANT CHANGE UP (ref 80–100)
MCV RBC AUTO: 93.2 FL — SIGNIFICANT CHANGE UP (ref 80–100)
METHADONE UR-MCNC: NEGATIVE — SIGNIFICANT CHANGE UP
MONOCYTES # BLD AUTO: 0.68 K/UL — SIGNIFICANT CHANGE UP (ref 0–0.9)
MONOCYTES # BLD AUTO: 0.71 K/UL — SIGNIFICANT CHANGE UP (ref 0–0.9)
MONOCYTES NFR BLD AUTO: 10.3 % — SIGNIFICANT CHANGE UP (ref 2–14)
MONOCYTES NFR BLD AUTO: 5.3 % — SIGNIFICANT CHANGE UP (ref 2–14)
NEUTROPHILS # BLD AUTO: 3.88 K/UL — SIGNIFICANT CHANGE UP (ref 1.8–7.4)
NEUTROPHILS # BLD AUTO: 5.58 K/UL — SIGNIFICANT CHANGE UP (ref 1.8–7.4)
NEUTROPHILS NFR BLD AUTO: 40.7 % — LOW (ref 43–77)
NEUTROPHILS NFR BLD AUTO: 58.6 % — SIGNIFICANT CHANGE UP (ref 43–77)
NEUTS BAND # BLD: 0.9 % — SIGNIFICANT CHANGE UP (ref 0–8)
NITRITE UR-MCNC: NEGATIVE — SIGNIFICANT CHANGE UP
NT-PROBNP SERPL-SCNC: HIGH PG/ML (ref 0–300)
OPIATES UR-MCNC: POSITIVE
OVALOCYTES BLD QL SMEAR: SLIGHT — SIGNIFICANT CHANGE UP
PCP SPEC-MCNC: SIGNIFICANT CHANGE UP
PCP UR-MCNC: NEGATIVE — SIGNIFICANT CHANGE UP
PH UR: 5 — SIGNIFICANT CHANGE UP (ref 5–8)
PLAT MORPH BLD: NORMAL — SIGNIFICANT CHANGE UP
PLATELET # BLD AUTO: 146 K/UL — LOW (ref 150–400)
PLATELET # BLD AUTO: 190 K/UL — SIGNIFICANT CHANGE UP (ref 150–400)
POIKILOCYTOSIS BLD QL AUTO: SIGNIFICANT CHANGE UP
POLYCHROMASIA BLD QL SMEAR: SLIGHT — SIGNIFICANT CHANGE UP
POTASSIUM SERPL-MCNC: 4.1 MMOL/L — SIGNIFICANT CHANGE UP (ref 3.5–5.3)
POTASSIUM SERPL-MCNC: 4.6 MMOL/L — SIGNIFICANT CHANGE UP (ref 3.5–5.3)
POTASSIUM SERPL-SCNC: 4.1 MMOL/L — SIGNIFICANT CHANGE UP (ref 3.5–5.3)
POTASSIUM SERPL-SCNC: 4.6 MMOL/L — SIGNIFICANT CHANGE UP (ref 3.5–5.3)
PROT SERPL-MCNC: 5.3 G/DL — LOW (ref 6.6–8.7)
PROT SERPL-MCNC: 5.4 G/DL — LOW (ref 6.6–8.7)
PROT UR-MCNC: 100
PROTHROM AB SERPL-ACNC: 15.3 SEC — HIGH (ref 10.5–13.4)
RBC # BLD: 3.32 M/UL — LOW (ref 4.2–5.8)
RBC # BLD: 3.37 M/UL — LOW (ref 4.2–5.8)
RBC # FLD: 14.8 % — HIGH (ref 10.3–14.5)
RBC # FLD: 15.1 % — HIGH (ref 10.3–14.5)
RBC BLD AUTO: ABNORMAL
RBC CASTS # UR COMP ASSIST: ABNORMAL /HPF (ref 0–4)
SARS-COV-2 RNA SPEC QL NAA+PROBE: SIGNIFICANT CHANGE UP
SCHISTOCYTES BLD QL AUTO: SIGNIFICANT CHANGE UP
SMUDGE CELLS # BLD: PRESENT — SIGNIFICANT CHANGE UP
SODIUM SERPL-SCNC: 141 MMOL/L — SIGNIFICANT CHANGE UP (ref 135–145)
SODIUM SERPL-SCNC: 142 MMOL/L — SIGNIFICANT CHANGE UP (ref 135–145)
SP GR SPEC: 1.02 — SIGNIFICANT CHANGE UP (ref 1.01–1.02)
SPECIMEN SOURCE: SIGNIFICANT CHANGE UP
SPECIMEN SOURCE: SIGNIFICANT CHANGE UP
THC UR QL: NEGATIVE — SIGNIFICANT CHANGE UP
TROPONIN T SERPL-MCNC: 0.04 NG/ML — SIGNIFICANT CHANGE UP (ref 0–0.06)
UROBILINOGEN FLD QL: NEGATIVE MG/DL — SIGNIFICANT CHANGE UP
WBC # BLD: 13.41 K/UL — HIGH (ref 3.8–10.5)
WBC # BLD: 6.62 K/UL — SIGNIFICANT CHANGE UP (ref 3.8–10.5)
WBC # FLD AUTO: 13.41 K/UL — HIGH (ref 3.8–10.5)
WBC # FLD AUTO: 6.62 K/UL — SIGNIFICANT CHANGE UP (ref 3.8–10.5)
WBC UR QL: SIGNIFICANT CHANGE UP /HPF (ref 0–5)

## 2022-01-01 PROCEDURE — 80053 COMPREHEN METABOLIC PANEL: CPT

## 2022-01-01 PROCEDURE — 36415 COLL VENOUS BLD VENIPUNCTURE: CPT

## 2022-01-01 PROCEDURE — 99232 SBSQ HOSP IP/OBS MODERATE 35: CPT

## 2022-01-01 PROCEDURE — 85610 PROTHROMBIN TIME: CPT

## 2022-01-01 PROCEDURE — 83540 ASSAY OF IRON: CPT

## 2022-01-01 PROCEDURE — 84132 ASSAY OF SERUM POTASSIUM: CPT

## 2022-01-01 PROCEDURE — 84295 ASSAY OF SERUM SODIUM: CPT

## 2022-01-01 PROCEDURE — 99285 EMERGENCY DEPT VISIT HI MDM: CPT

## 2022-01-01 PROCEDURE — 95711 VEEG 2-12 HR UNMONITORED: CPT

## 2022-01-01 PROCEDURE — 96365 THER/PROPH/DIAG IV INF INIT: CPT

## 2022-01-01 PROCEDURE — 87086 URINE CULTURE/COLONY COUNT: CPT

## 2022-01-01 PROCEDURE — 81001 URINALYSIS AUTO W/SCOPE: CPT

## 2022-01-01 PROCEDURE — 82553 CREATINE MB FRACTION: CPT

## 2022-01-01 PROCEDURE — 71045 X-RAY EXAM CHEST 1 VIEW: CPT

## 2022-01-01 PROCEDURE — 84100 ASSAY OF PHOSPHORUS: CPT

## 2022-01-01 PROCEDURE — 71250 CT THORAX DX C-: CPT | Mod: 26

## 2022-01-01 PROCEDURE — 85018 HEMOGLOBIN: CPT

## 2022-01-01 PROCEDURE — 95700 EEG CONT REC W/VID EEG TECH: CPT

## 2022-01-01 PROCEDURE — 96375 TX/PRO/DX INJ NEW DRUG ADDON: CPT

## 2022-01-01 PROCEDURE — 83735 ASSAY OF MAGNESIUM: CPT

## 2022-01-01 PROCEDURE — 84484 ASSAY OF TROPONIN QUANT: CPT

## 2022-01-01 PROCEDURE — 82330 ASSAY OF CALCIUM: CPT

## 2022-01-01 PROCEDURE — 82435 ASSAY OF BLOOD CHLORIDE: CPT

## 2022-01-01 PROCEDURE — 80307 DRUG TEST PRSMV CHEM ANLYZR: CPT

## 2022-01-01 PROCEDURE — U0003: CPT

## 2022-01-01 PROCEDURE — 82803 BLOOD GASES ANY COMBINATION: CPT

## 2022-01-01 PROCEDURE — 82550 ASSAY OF CK (CPK): CPT

## 2022-01-01 PROCEDURE — 86901 BLOOD TYPING SEROLOGIC RH(D): CPT

## 2022-01-01 PROCEDURE — 99291 CRITICAL CARE FIRST HOUR: CPT

## 2022-01-01 PROCEDURE — 93010 ELECTROCARDIOGRAM REPORT: CPT

## 2022-01-01 PROCEDURE — 95714 VEEG EA 12-26 HR UNMNTR: CPT

## 2022-01-01 PROCEDURE — 70450 CT HEAD/BRAIN W/O DYE: CPT

## 2022-01-01 PROCEDURE — 85045 AUTOMATED RETICULOCYTE COUNT: CPT

## 2022-01-01 PROCEDURE — 85730 THROMBOPLASTIN TIME PARTIAL: CPT

## 2022-01-01 PROCEDURE — 80048 BASIC METABOLIC PNL TOTAL CA: CPT

## 2022-01-01 PROCEDURE — 93306 TTE W/DOPPLER COMPLETE: CPT

## 2022-01-01 PROCEDURE — 86850 RBC ANTIBODY SCREEN: CPT

## 2022-01-01 PROCEDURE — 70450 CT HEAD/BRAIN W/O DYE: CPT | Mod: 26

## 2022-01-01 PROCEDURE — 85025 COMPLETE CBC W/AUTO DIFF WBC: CPT

## 2022-01-01 PROCEDURE — 82947 ASSAY GLUCOSE BLOOD QUANT: CPT

## 2022-01-01 PROCEDURE — 74176 CT ABD & PELVIS W/O CONTRAST: CPT | Mod: 26

## 2022-01-01 PROCEDURE — 87040 BLOOD CULTURE FOR BACTERIA: CPT

## 2022-01-01 PROCEDURE — 96374 THER/PROPH/DIAG INJ IV PUSH: CPT

## 2022-01-01 PROCEDURE — 82140 ASSAY OF AMMONIA: CPT

## 2022-01-01 PROCEDURE — G1004: CPT

## 2022-01-01 PROCEDURE — 94002 VENT MGMT INPAT INIT DAY: CPT

## 2022-01-01 PROCEDURE — 70450 CT HEAD/BRAIN W/O DYE: CPT | Mod: MG

## 2022-01-01 PROCEDURE — 82962 GLUCOSE BLOOD TEST: CPT

## 2022-01-01 PROCEDURE — 72125 CT NECK SPINE W/O DYE: CPT

## 2022-01-01 PROCEDURE — 71045 X-RAY EXAM CHEST 1 VIEW: CPT | Mod: 26

## 2022-01-01 PROCEDURE — 83550 IRON BINDING TEST: CPT

## 2022-01-01 PROCEDURE — 74176 CT ABD & PELVIS W/O CONTRAST: CPT

## 2022-01-01 PROCEDURE — U0005: CPT

## 2022-01-01 PROCEDURE — 85014 HEMATOCRIT: CPT

## 2022-01-01 PROCEDURE — 83880 ASSAY OF NATRIURETIC PEPTIDE: CPT

## 2022-01-01 PROCEDURE — 83605 ASSAY OF LACTIC ACID: CPT

## 2022-01-01 PROCEDURE — 93005 ELECTROCARDIOGRAM TRACING: CPT

## 2022-01-01 PROCEDURE — 99239 HOSP IP/OBS DSCHRG MGMT >30: CPT

## 2022-01-01 PROCEDURE — 97163 PT EVAL HIGH COMPLEX 45 MIN: CPT

## 2022-01-01 PROCEDURE — 95813 EEG EXTND MNTR 61-119 MIN: CPT

## 2022-01-01 PROCEDURE — 95718 EEG PHYS/QHP 2-12 HR W/VEEG: CPT

## 2022-01-01 PROCEDURE — 71250 CT THORAX DX C-: CPT

## 2022-01-01 PROCEDURE — 72125 CT NECK SPINE W/O DYE: CPT | Mod: 26

## 2022-01-01 PROCEDURE — 84466 ASSAY OF TRANSFERRIN: CPT

## 2022-01-01 PROCEDURE — 86900 BLOOD TYPING SEROLOGIC ABO: CPT

## 2022-01-01 RX ORDER — DEXTROSE 50 % IN WATER 50 %
15 SYRINGE (ML) INTRAVENOUS ONCE
Refills: 0 | Status: DISCONTINUED | OUTPATIENT
Start: 2022-01-01 | End: 2022-01-01

## 2022-01-01 RX ORDER — INSULIN LISPRO 100/ML
VIAL (ML) SUBCUTANEOUS EVERY 6 HOURS
Refills: 0 | Status: DISCONTINUED | OUTPATIENT
Start: 2022-01-01 | End: 2022-01-01

## 2022-01-01 RX ORDER — SODIUM CHLORIDE 9 MG/ML
1000 INJECTION INTRAMUSCULAR; INTRAVENOUS; SUBCUTANEOUS ONCE
Refills: 0 | Status: COMPLETED | OUTPATIENT
Start: 2022-01-01 | End: 2022-01-01

## 2022-01-01 RX ORDER — CHLORHEXIDINE GLUCONATE 213 G/1000ML
1 SOLUTION TOPICAL
Refills: 0 | Status: DISCONTINUED | OUTPATIENT
Start: 2022-01-01 | End: 2022-01-01

## 2022-01-01 RX ORDER — SODIUM BICARBONATE 1 MEQ/ML
50 SYRINGE (ML) INTRAVENOUS ONCE
Refills: 0 | Status: COMPLETED | OUTPATIENT
Start: 2022-01-01 | End: 2022-01-01

## 2022-01-01 RX ORDER — PANTOPRAZOLE SODIUM 20 MG/1
40 TABLET, DELAYED RELEASE ORAL DAILY
Refills: 0 | Status: DISCONTINUED | OUTPATIENT
Start: 2022-01-01 | End: 2022-01-01

## 2022-01-01 RX ORDER — ASPIRIN/CALCIUM CARB/MAGNESIUM 324 MG
81 TABLET ORAL DAILY
Refills: 0 | Status: DISCONTINUED | OUTPATIENT
Start: 2022-01-01 | End: 2022-01-01

## 2022-01-01 RX ORDER — SODIUM CHLORIDE 9 MG/ML
1000 INJECTION, SOLUTION INTRAVENOUS
Refills: 0 | Status: DISCONTINUED | OUTPATIENT
Start: 2022-01-01 | End: 2022-01-01

## 2022-01-01 RX ORDER — AMIODARONE HYDROCHLORIDE 400 MG/1
150 TABLET ORAL ONCE
Refills: 0 | Status: COMPLETED | OUTPATIENT
Start: 2022-01-01 | End: 2022-01-01

## 2022-01-01 RX ORDER — CHLORHEXIDINE GLUCONATE 213 G/1000ML
15 SOLUTION TOPICAL EVERY 12 HOURS
Refills: 0 | Status: DISCONTINUED | OUTPATIENT
Start: 2022-01-01 | End: 2022-01-01

## 2022-01-01 RX ORDER — LEVETIRACETAM 250 MG/1
750 TABLET, FILM COATED ORAL EVERY 12 HOURS
Refills: 0 | Status: DISCONTINUED | OUTPATIENT
Start: 2022-01-01 | End: 2022-01-01

## 2022-01-01 RX ORDER — TICAGRELOR 90 MG/1
90 TABLET ORAL DAILY
Refills: 0 | Status: DISCONTINUED | OUTPATIENT
Start: 2022-03-25 | End: 2022-01-01

## 2022-01-01 RX ORDER — NOREPINEPHRINE BITARTRATE/D5W 8 MG/250ML
0.25 PLASTIC BAG, INJECTION (ML) INTRAVENOUS
Qty: 8 | Refills: 0 | Status: DISCONTINUED | OUTPATIENT
Start: 2022-01-01 | End: 2022-01-01

## 2022-01-01 RX ORDER — LEVETIRACETAM 750 MG/1
750 TABLET, FILM COATED ORAL
Qty: 180 | Refills: 3 | Status: ACTIVE | COMMUNITY
Start: 2022-01-01 | End: 1900-01-01

## 2022-01-01 RX ORDER — DEXTROSE 50 % IN WATER 50 %
12.5 SYRINGE (ML) INTRAVENOUS ONCE
Refills: 0 | Status: DISCONTINUED | OUTPATIENT
Start: 2022-01-01 | End: 2022-01-01

## 2022-01-01 RX ORDER — DEXTROSE 50 % IN WATER 50 %
25 SYRINGE (ML) INTRAVENOUS ONCE
Refills: 0 | Status: DISCONTINUED | OUTPATIENT
Start: 2022-01-01 | End: 2022-01-01

## 2022-01-01 RX ORDER — GLUCAGON INJECTION, SOLUTION 0.5 MG/.1ML
1 INJECTION, SOLUTION SUBCUTANEOUS ONCE
Refills: 0 | Status: DISCONTINUED | OUTPATIENT
Start: 2022-01-01 | End: 2022-01-01

## 2022-01-01 RX ORDER — EMPAGLIFLOZIN 10 MG/1
1 TABLET, FILM COATED ORAL
Qty: 30 | Refills: 0
Start: 2022-01-01 | End: 2022-01-01

## 2022-01-01 RX ORDER — AMIODARONE HYDROCHLORIDE 400 MG/1
1 TABLET ORAL
Qty: 900 | Refills: 0 | Status: DISCONTINUED | OUTPATIENT
Start: 2022-01-01 | End: 2022-01-01

## 2022-01-01 RX ADMIN — Medication 40 MILLIGRAM(S): at 05:49

## 2022-01-01 RX ADMIN — SODIUM CHLORIDE 1000 MILLILITER(S): 9 INJECTION INTRAMUSCULAR; INTRAVENOUS; SUBCUTANEOUS at 18:05

## 2022-01-01 RX ADMIN — LEVETIRACETAM 750 MILLIGRAM(S): 250 TABLET, FILM COATED ORAL at 05:59

## 2022-01-01 RX ADMIN — LEVETIRACETAM 750 MILLIGRAM(S): 250 TABLET, FILM COATED ORAL at 05:48

## 2022-01-01 RX ADMIN — Medication 40 MILLIGRAM(S): at 18:20

## 2022-01-01 RX ADMIN — Medication 81 MILLIGRAM(S): at 12:06

## 2022-01-01 RX ADMIN — SACUBITRIL AND VALSARTAN 1 TABLET(S): 24; 26 TABLET, FILM COATED ORAL at 06:01

## 2022-01-01 RX ADMIN — GABAPENTIN 100 MILLIGRAM(S): 400 CAPSULE ORAL at 14:36

## 2022-01-01 RX ADMIN — TICAGRELOR 90 MILLIGRAM(S): 90 TABLET ORAL at 12:05

## 2022-01-01 RX ADMIN — GABAPENTIN 100 MILLIGRAM(S): 400 CAPSULE ORAL at 13:02

## 2022-01-01 RX ADMIN — TICAGRELOR 90 MILLIGRAM(S): 90 TABLET ORAL at 12:00

## 2022-01-01 RX ADMIN — LEVETIRACETAM 750 MILLIGRAM(S): 250 TABLET, FILM COATED ORAL at 18:21

## 2022-01-01 RX ADMIN — GABAPENTIN 100 MILLIGRAM(S): 400 CAPSULE ORAL at 06:00

## 2022-01-01 RX ADMIN — Medication 50 MILLIEQUIVALENT(S): at 18:04

## 2022-01-01 RX ADMIN — SACUBITRIL AND VALSARTAN 1 TABLET(S): 24; 26 TABLET, FILM COATED ORAL at 05:49

## 2022-01-01 RX ADMIN — Medication 1: at 08:35

## 2022-01-01 RX ADMIN — Medication 40 MILLIGRAM(S): at 06:01

## 2022-01-01 RX ADMIN — Medication 1: at 12:02

## 2022-01-01 RX ADMIN — Medication 37.5 MICROGRAM(S)/KG/MIN: at 19:05

## 2022-01-01 RX ADMIN — SACUBITRIL AND VALSARTAN 1 TABLET(S): 24; 26 TABLET, FILM COATED ORAL at 18:21

## 2022-01-01 RX ADMIN — ENOXAPARIN SODIUM 40 MILLIGRAM(S): 100 INJECTION SUBCUTANEOUS at 12:05

## 2022-01-01 RX ADMIN — Medication 81 MILLIGRAM(S): at 12:58

## 2022-01-01 RX ADMIN — Medication 1: at 18:19

## 2022-01-01 RX ADMIN — Medication 325 MILLIGRAM(S): at 12:05

## 2022-01-01 RX ADMIN — Medication 325 MILLIGRAM(S): at 12:58

## 2022-01-01 RX ADMIN — AMIODARONE HYDROCHLORIDE 33.3 MG/MIN: 400 TABLET ORAL at 19:05

## 2022-01-01 RX ADMIN — GABAPENTIN 100 MILLIGRAM(S): 400 CAPSULE ORAL at 21:24

## 2022-01-01 RX ADMIN — ENOXAPARIN SODIUM 40 MILLIGRAM(S): 100 INJECTION SUBCUTANEOUS at 12:59

## 2022-01-01 RX ADMIN — SPIRONOLACTONE 25 MILLIGRAM(S): 25 TABLET, FILM COATED ORAL at 05:59

## 2022-01-01 RX ADMIN — GABAPENTIN 100 MILLIGRAM(S): 400 CAPSULE ORAL at 05:49

## 2022-01-01 RX ADMIN — AMIODARONE HYDROCHLORIDE 618 MILLIGRAM(S): 400 TABLET ORAL at 18:04

## 2022-01-01 RX ADMIN — SPIRONOLACTONE 25 MILLIGRAM(S): 25 TABLET, FILM COATED ORAL at 05:48

## 2022-01-01 NOTE — PROGRESS NOTE ADULT - ASSESSMENT
The patient is a 66y Male who is followed by neurology because of seizure    Seizure  breakthrough insetting of hypoglycemia  cvEEG shows epileptic potential due to old right frontal stroke, but no seizures  continue with keppra 750 mg po bid  As there is no significant findings on EEG, he can be discharged when medically stable    Thank you for allowing me to participate in the care of your patient    Arcenio Sanchez MD, PhD   [phone]

## 2022-01-01 NOTE — PROGRESS NOTE ADULT - SUBJECTIVE AND OBJECTIVE BOX
[facility]                                     Neurology at [place]                                 Laura Harvey, & Bob                                  [address]                                        [place], [place], [address]                                             [phone]      CC/HPI:  seizure  67 yo man with medical conditions as outlined below who follows with Breanna Hunt and Dr. Celis as an outpatient for epilepsy and hx of stroke admitted for seizure this morning likely in the setting of hypoglycemia. He presented to [facility] ED 2/25 with new onset self limiting convulsive status epilepticus and has been on keppra 750mg bid since. He states he is compliant with his medications and has not had a seizure since. He does not recall what happened this morning, but now feels back to baseline. He was recently admitted for DKA. His blood sugar was 40 this morning. He then had a convulsive seizure at home and one witnessed by EMS. He was found to be COVID + in the ED, but denies feeling ill. (TK)    Interval HPI: no seizures on cvEEG    Review of systems (neurology): Denies headache or dizziness. Denies weakness/numbness.  Denies speech/language deficits. Denies diplopia/blurred vision.  Denies confusion    MEDICATIONS  (STANDING):  aspirin enteric coated 81 milliGRAM(s) Oral daily  dextrose 40% Gel 15 Gram(s) Oral once  dextrose 5%. 1000 milliLiter(s) (50 mL/Hr) IV Continuous <Continuous>  dextrose 5%. 1000 milliLiter(s) (100 mL/Hr) IV Continuous <Continuous>  dextrose 50% Injectable 25 Gram(s) IV Push once  dextrose 50% Injectable 12.5 Gram(s) IV Push once  dextrose 50% Injectable 25 Gram(s) IV Push once  enoxaparin Injectable 40 milliGRAM(s) SubCutaneous daily  ferrous    sulfate 325 milliGRAM(s) Oral daily  furosemide   Injectable 40 milliGRAM(s) IV Push two times a day  gabapentin 100 milliGRAM(s) Oral three times a day  glucagon  Injectable 1 milliGRAM(s) IntraMuscular once  insulin lispro (ADMELOG) corrective regimen sliding scale   SubCutaneous three times a day before meals  insulin lispro (ADMELOG) corrective regimen sliding scale   SubCutaneous at bedtime  levETIRAcetam 750 milliGRAM(s) Oral two times a day  sacubitril 49 mG/valsartan 51 mG 1 Tablet(s) Oral two times a day  spironolactone 25 milliGRAM(s) Oral daily  ticagrelor 90 milliGRAM(s) Oral daily    MEDICATIONS  (PRN):      Vital Signs Last 24 Hrs  T(C): 36.9 (01 Jan 2022 05:39), Max: 36.9 (31 Dec 2021 16:14)  T(F): 98.4 (01 Jan 2022 05:39), Max: 98.5 (31 Dec 2021 16:14)  HR: 84 (01 Jan 2022 05:39) (84 - 91)  BP: 160/77 (01 Jan 2022 05:39) (153/77 - 160/77)  BP(mean): 100 (01 Jan 2022 05:39) (100 - 104)  RR: 18 (01 Jan 2022 05:39) (18 - 18)  SpO2: 98% (01 Jan 2022 05:39) (95% - 100%)    Detailed Neurologic Exam:    Mental status: The patient is awake and alert and has normal attention span.  The patient is fully oriented in 3 spheres. The patient is oriented to current events. The patient is able to name objects, follow commands, repeat sentences.    Cranial nerves: . Pupils equal and react symmetrically to light. There is no visual field deficit to confrontation. Extraocular motion is full with no nystagmus. There is no ptosis. Facial sensation is intact. Facial musculature is symmetric. Palate elevates symmetrically.  Tongue is midline.    Motor: There is normal bulk and tone.  There is no tremor.  Strength is 5/5 in the right arm and leg.   Strength is 5/5 in the left arm and leg.    Sensation: Intact to light touch and pin in 4 extremities    Reflexes: 2+ throughout and plantar responses are flexor.    Cerebellar: There is no dysmetria on finger to nose testing.    Gait : deferred    Labs:                                   9.4    6.62  )-----------( 190      ( 01 Jan 2022 06:11 )             29.1     01-01    142  |  108<H>  |  14.1  ----------------------------<  141<H>  4.1   |  24.0  |  0.81    Ca    7.9<L>      01 Jan 2022 06:11  Phos  3.1     12-31  Mg     2.0     01-01    TPro  5.4<L>  /  Alb  2.6<L>  /  TBili  0.3<L>  /  DBili  x   /  AST  29  /  ALT  15  /  AlkPhos  77  01-01    LIVER FUNCTIONS - ( 01 Jan 2022 06:11 )  Alb: 2.6 g/dL / Pro: 5.4 g/dL / ALK PHOS: 77 U/L / ALT: 15 U/L / AST: 29 U/L / GGT: x           Radiology (studies independently reviewed unless otherwise noted):  Head CT 12/30 showed old right frontal stroke, no acute CVA. mass or blood      -----------------------------------------------------------------------------------------------------  EEG 12/31/21-1/1/22  Clinical Impression:  Moderate multifocal cerebral dysfunction  Dysfunction more prominent intermittently over the right frontal region.  There were no definite spike wave abnormalities recorded, though LRDA in some instances can be associated with an increased risk for seizure. [BING Neurol. 2017;74(2):181–188]      In absence of additional clinical concerns, recommend consideration for discontinuation of current EEG study with reconnection in future if warranted.      -------------------------------------------------------------------------------------------------------

## 2022-01-01 NOTE — CONSULT NOTE ADULT - SUBJECTIVE AND OBJECTIVE BOX
[facility]                                                                     MD Laurence Prieto DO                                              Epilepsy Consult #: [id number] ([phone])                                               Office: [address] [place] [address], [id number]                                                 Phone: [phone]; Fax: [phone]    HPI:  65 yo man with medical conditions as outlined below who follows with Breanna Hunt and Dr. Celis as an outpatient for epilepsy and hx of stroke admitted for seizure this morning likely in the setting of hypoglycemia. He presented to [facility] ED 2/25 with new onset self limiting convulsive status epilepticus and has been on keppra 750mg bid since. He states he is compliant with his medications and has not had a seizure since. He does not recall what happened this morning, but now feels back to baseline. He was recently admitted for DKA. His blood sugar was 40 this morning. He then had a convulsive seizure at home and one witnessed by EMS. He was found to be COVID + in the ED, but denies feeling ill.    PMHx:  CAD  CHF  Epilepsy with both generalized and focal features  DM II  Vision loss    PSHx:  s/p CABG  s/p ICD    Meds:  MEDICATIONS  (STANDING):  aspirin enteric coated 81 milliGRAM(s) Oral daily  dextrose 40% Gel 15 Gram(s) Oral once  dextrose 5%. 1000 milliLiter(s) (50 mL/Hr) IV Continuous <Continuous>  dextrose 5%. 1000 milliLiter(s) (100 mL/Hr) IV Continuous <Continuous>  dextrose 50% Injectable 25 Gram(s) IV Push once  dextrose 50% Injectable 12.5 Gram(s) IV Push once  dextrose 50% Injectable 25 Gram(s) IV Push once  enoxaparin Injectable 40 milliGRAM(s) SubCutaneous daily  furosemide   Injectable 40 milliGRAM(s) IV Push two times a day  gabapentin 100 milliGRAM(s) Oral three times a day  glucagon  Injectable 1 milliGRAM(s) IntraMuscular once  insulin lispro (ADMELOG) corrective regimen sliding scale   SubCutaneous at bedtime  levETIRAcetam 750 milliGRAM(s) Oral two times a day  sacubitril 49 mG/valsartan 51 mG 1 Tablet(s) Oral two times a day  spironolactone 25 milliGRAM(s) Oral daily    MEDICATIONS  (PRN):    Allergies:  Toprol XL    FamHx:  noncontributory    SocHx:  Denies toxic habits    Physical Exam  Vital Signs Last 24 Hrs  T(C): 36.5 (30 Dec 2021 15:43), Max: 36.5 (30 Dec 2021 15:43)  T(F): 97.7 (30 Dec 2021 15:43), Max: 97.7 (30 Dec 2021 15:43)  HR: 97 (30 Dec 2021 15:43) (76 - 97)  BP: 159/88 (30 Dec 2021 15:43) (131/74 - 159/88)  BP(mean): --  RR: 18 (30 Dec 2021 15:43) (18 - 22)  SpO2: 97% (30 Dec 2021 15:43) (97% - 100%)  General: no acute distress  HEENT:NC/AT  Lungs: no respiratory distress  Skin: no rash or ecchymoses  Lower extremities: no edema    Mental Status: AAO x 3, no dysarthria, no aphasia  CN: PERRL, EOMI, VFF, V1-V3 sensation intact, no facial asymmetry  Motor:  5/5 x 4 extremities  Sensory: intact to light touch throughout  Coordination: no dysmetria on FTN  Gait: deferred      LABS:  cret                        9.1    7.65  )-----------( 178      ( 30 Dec 2021 12:16 )             27.9     12-30    141  |  106  |  14.9  ----------------------------<  183<H>  4.2   |  24.0  |  0.87    Ca    7.9<L>      30 Dec 2021 12:16    TPro  5.3<L>  /  Alb  2.4<L>  /  TBili  0.3<L>  /  DBili  x   /  AST  32  /  ALT  16  /  AlkPhos  67  12-30    PT/INR - ( 30 Dec 2021 12:16 )   PT: 13.4 sec;   INR: 1.16 ratio         PTT - ( 30 Dec 2021 12:16 )  PTT:30.9 sec    CT head w/o contrast - no acute findings  
HPI:  66 year old male known to our service with PMH of T2DM, CAD s/p PCI, Cardiomyopathy, CHF, HTN and seizure disorder who presented to ED on 12/30/2021 after having a seizure during a hypoglycemic episode.   Patient reports testing his BG on morning of admission, which was in the low normal range.  He took 10 units of Novolog before his breakfast and then cannot recall further events.  Per review of chart, patient became hypoglycemic and had a witnessed seizure.  He was brought to the ED and has now been admitted for observation and is currently undergoing 24 hour EEG.   Since admission, insulin has been held and no further episodes of hypoglycemia has occurred.   Of note, his COVID-19 PCR swab was positive (previously unvaccinated), but he is otherwise asymptomatic at this time.      Patient was diagnosed with Type 2 DM in 2003.  He does have associated neuropathy and retinopathy.  His DM was severely uncontrolled up until his recent admission for colitis several weeks ago.  He was discharged on his usual insulin regimen, but he reports that since this time he has been more compliant with his diabetes care which has resulted in a marked improvement in his blood glucose levels.  He has been taking Basaglar 20 units qhs and Novolog 10 units with meals.      PAST MEDICAL & SURGICAL HISTORY:  CAD (Coronary Artery Disease)  LAD occlusion  HTN (Hypertension)  Diabetes  Depression  Abnormal nuclear stress test  S/P Inguinal Hernia Repair  ishaan with mesh 2008  S/P CABG (coronary artery bypass graft)  Cardiomyopathy  CHF    Allergies  Toprol-XL (Faint; Other)    MEDICATIONS  (STANDING):  aspirin enteric coated 81 milliGRAM(s) Oral daily  dextrose 40% Gel 15 Gram(s) Oral once  dextrose 5%. 1000 milliLiter(s) (50 mL/Hr) IV Continuous <Continuous>  dextrose 5%. 1000 milliLiter(s) (100 mL/Hr) IV Continuous <Continuous>  dextrose 50% Injectable 25 Gram(s) IV Push once  dextrose 50% Injectable 12.5 Gram(s) IV Push once  dextrose 50% Injectable 25 Gram(s) IV Push once  enoxaparin Injectable 40 milliGRAM(s) SubCutaneous daily  ferrous    sulfate 325 milliGRAM(s) Oral daily  furosemide   Injectable 40 milliGRAM(s) IV Push two times a day  gabapentin 100 milliGRAM(s) Oral three times a day  glucagon  Injectable 1 milliGRAM(s) IntraMuscular once  insulin lispro (ADMELOG) corrective regimen sliding scale   SubCutaneous at bedtime  levETIRAcetam 750 milliGRAM(s) Oral two times a day  sacubitril 49 mG/valsartan 51 mG 1 Tablet(s) Oral two times a day  spironolactone 25 milliGRAM(s) Oral daily  ticagrelor 90 milliGRAM(s) Oral daily    SH:   No smoking    FH:  no known DM    ROS:  as per HPI, otherwise 10 point ROS was negative.     Vital Signs Last 24 Hrs  T(C): 37 (31 Dec 2021 08:10), Max: 37 (31 Dec 2021 08:10)  T(F): 98.6 (31 Dec 2021 08:10), Max: 98.6 (31 Dec 2021 08:10)  HR: 86 (31 Dec 2021 08:10) (86 - 97)  BP: 138/82 (31 Dec 2021 08:10) (130/70 - 171/82)  BP(mean): 84 (31 Dec 2021 04:00) (84 - 84)  RR: 17 (31 Dec 2021 08:10) (14 - 18)  SpO2: 98% (31 Dec 2021 08:10) (97% - 98%)    PE:  Gen: AAO, NAD  HEENT:  sclera anicteric, MMM  Neck:  no thyromegaly, no LAD  CV:  nl S1 + S2, RRR, no m/r/g  Resp:  nl respiratory effort, CTA b/l  GI/ Abd: soft, NT/ ND, BS +  Neuro:  No tremor, sensation intact to light touch on b/l feet  MS:  no c/c/e, nl muscle tone  Skin:  no foot ulcers, no acanthosis  Psych:  affect appropriate    LABS:  A1C with Estimated Average Glucose Result: 12.0 % (12.17.21 @ 03:38)      12-31    142  |  104  |  11.1  ----------------------------<  78  3.4<L>   |  25.0  |  0.87    Ca    7.9<L>      31 Dec 2021 09:55  Phos  3.1     12-31  Mg     1.6     12-31    TPro  5.3<L>  /  Alb  2.4<L>  /  TBili  0.3<L>  /  DBili  x   /  AST  32  /  ALT  16  /  AlkPhos  67  12-30                          9.1    7.65  )-----------( 178      ( 30 Dec 2021 12:16 )             27.9     CAPILLARY BLOOD GLUCOSE  POCT Blood Glucose.: 105 mg/dL (31 Dec 2021 11:35)  POCT Blood Glucose.: 86 mg/dL (31 Dec 2021 08:14)  POCT Blood Glucose.: 115 mg/dL (31 Dec 2021 04:05)  POCT Blood Glucose.: 123 mg/dL (31 Dec 2021 01:56)  POCT Blood Glucose.: 145 mg/dL (31 Dec 2021 00:20)  POCT Blood Glucose.: 137 mg/dL (30 Dec 2021 21:54)  POCT Blood Glucose.: 176 mg/dL (30 Dec 2021 19:01)  POCT Blood Glucose.: 171 mg/dL (30 Dec 2021 16:58)      
                [place] [facility]                                   [address] [place] [address]                                                      PHONE: [phone]                                                         FAX: [phone]  [email or web address]  ---------------------------------------------------------------------------------------------------------------------------------    Reason for Consult: CHF, COVID  CVS: Jose  HPI:  SHASTA MCKINLEY is an 66y Male PMHx HTN, HLD, DM, ICM LVEF 20-25% sp ICD, ILR, 1V CABG, Isaak to pLCx, mild MR pw seizure likely secondary to hypoglycemia incidentally noted to have COVID positive and pt unvaccinated and with pleural effusions consistent with acute on chronic HFrEF.    PAST MEDICAL & SURGICAL HISTORY:  CAD (Coronary Artery Disease)  LAD occlusion    HTN (Hypertension)  controled with meds    Diabetes    Depression    Abnormal nuclear stress test    S/P Inguinal Hernia Repair  ishaan with mesh 2008    S/P CABG (coronary artery bypass graft)        Toprol-XL (Faint; Other)      MEDICATIONS  (STANDING):  aspirin enteric coated 81 milliGRAM(s) Oral daily  dextrose 40% Gel 15 Gram(s) Oral once  dextrose 5%. 1000 milliLiter(s) (50 mL/Hr) IV Continuous <Continuous>  dextrose 5%. 1000 milliLiter(s) (100 mL/Hr) IV Continuous <Continuous>  dextrose 50% Injectable 25 Gram(s) IV Push once  dextrose 50% Injectable 12.5 Gram(s) IV Push once  dextrose 50% Injectable 25 Gram(s) IV Push once  enoxaparin Injectable 40 milliGRAM(s) SubCutaneous daily  ferrous    sulfate 325 milliGRAM(s) Oral daily  furosemide   Injectable 40 milliGRAM(s) IV Push two times a day  gabapentin 100 milliGRAM(s) Oral three times a day  glucagon  Injectable 1 milliGRAM(s) IntraMuscular once  insulin lispro (ADMELOG) corrective regimen sliding scale   SubCutaneous at bedtime  insulin lispro (ADMELOG) corrective regimen sliding scale   SubCutaneous three times a day before meals  levETIRAcetam 750 milliGRAM(s) Oral two times a day  sacubitril 49 mG/valsartan 51 mG 1 Tablet(s) Oral two times a day  spironolactone 25 milliGRAM(s) Oral daily  ticagrelor 90 milliGRAM(s) Oral daily    MEDICATIONS  (PRN):      Social History:  Cigarettes:     no               Alchohol:     no            Illicit Drug Abuse:  no  FHx no SCD  ROS: Negative other than as mentioned in HPI.    Vital Signs Last 24 Hrs  T(C): 36.9 (01 Jan 2022 05:39), Max: 36.9 (31 Dec 2021 12:00)  T(F): 98.4 (01 Jan 2022 05:39), Max: 98.5 (31 Dec 2021 16:14)  HR: 84 (01 Jan 2022 05:39) (84 - 98)  BP: 160/77 (01 Jan 2022 05:39) (135/69 - 160/77)  BP(mean): 100 (01 Jan 2022 05:39) (86 - 104)  RR: 18 (01 Jan 2022 05:39) (18 - 18)  SpO2: 98% (01 Jan 2022 05:39) (93% - 100%)  ICU Vital Signs Last 24 Hrs  SHASTA MCKINLEY  I&O's Detail    31 Dec 2021 07:01  -  01 Jan 2022 07:00  --------------------------------------------------------  IN:    IV PiggyBack: 100 mL    Oral Fluid: 770 mL  Total IN: 870 mL    OUT:    Voided (mL): 725 mL  Total OUT: 725 mL    Total NET: 145 mL        I&O's Summary    31 Dec 2021 07:01  -  01 Jan 2022 07:00  --------------------------------------------------------  IN: 870 mL / OUT: 725 mL / NET: 145 mL      Drug Dosing Weight  SHASTA MCKINLEY      PHYSICAL EXAM:  General:  alert and cooperative.  HEENT: Head; normocephalic, atraumatic.  Eyes: Pupils reactive, cornea wnl.  Neck: Supple, no nodes adenopathy, no NVD or carotid bruit or thyromegaly.  CARDIOVASCULAR: Normal S1 and S2, No murmur, rub, gallop or lift.   LUNGS: Decreased BS BL  ABDOMEN: Soft, nontender without mass or organomegaly. bowel sounds normoactive.  EXTREMITIES: No clubbing, cyanosis or edema. Distal pulses wnl.   SKIN: warm and dry with normal turgor.  NEURO: Alert/oriented x 3/normal motor exam. No pathologic reflexes.    PSYCH: normal affect.        LABS:                        9.4    6.62  )-----------( 190      ( 01 Jan 2022 06:11 )             29.1     01-01    142  |  108<H>  |  14.1  ----------------------------<  141<H>  4.1   |  24.0  |  0.81    Ca    7.9<L>      01 Jan 2022 06:11  Phos  3.1     12-31  Mg     2.0     01-01    TPro  5.4<L>  /  Alb  2.6<L>  /  TBili  0.3<L>  /  DBili  x   /  AST  29  /  ALT  15  /  AlkPhos  77  01-01    SHASTA MCKINLEY      PT/INR - ( 30 Dec 2021 12:16 )   PT: 13.4 sec;   INR: 1.16 ratio         PTT - ( 30 Dec 2021 12:16 )  PTT:30.9 sec      RADIOLOGY & ADDITIONAL STUDIES:    INTERPRETATION OF TELEMETRY (personally reviewed): no events    ECG:  NS @ 87 lateral T wave inversions    ECHO: < from: TTE Echo Complete w/o Contrast w/ Doppler (12.30.21 @ 22:43) >  Summary:   1. Left ventricular ejection fraction, by visual estimation, is 20 to   25%.   2. Severely decreased global left ventricular systolic function.   Spectral Doppler shows pseudonormal pattern of left ventricular   myocardial filling (Grade II diastolic dysfunction). Elevated mean left   atrial pressure.   3. The right ventricle is not well visualized   4. Mild to moderate mitral valve regurgitation.   5. The mitral valve leaflets are tethered which is due to reduced   systolic function and elevated LVDP.   6. Mild-moderate tricuspid regurgitation.   7. Mild aortic regurgitation.   8. Sclerotic aortic valve with normal opening.   9. Estimated pulmonary artery systolic pressure is 56.5 mmHg assuming a   right atrial pressure of 8 mmHg, which is consistent with moderate   pulmonary hypertension.    MD Luis Enrique Electronically signed on 12/31/2021 at 11:19:55 AM    < end of copied text >           Assessment and Plan:  In summary, SHASTA MCKINLEY is an 66y Male with past medical history significant for HTN, HLD, DM, ICM LVEF 20-25% sp ICD, ILR, 1V CABG, Isaak to pLCx, mild MR pw seizure likely secondary to hypoglycemia incidentally noted to have COVID positive and pt unvaccinated and with pleural effusions consistent with acute on chronic HFrEF.    1) agree with IV diuresis, monitor BMP daily to avoid renal toxicity  2) cw entresto, aldactone. Would not start Farxiga in the setting of recent hypoglycemia  3) COVID treatment as per primary team  4) Will follow

## 2022-01-01 NOTE — PROGRESS NOTE ADULT - ASSESSMENT
66 year old male with Type 2 DM complicated by neuropathy and retinopathy, CAD, CHF, seizure disorder admitted with seizure occurring in setting of hypoglycemic event which was likely the result of excessive dose of prandial insulin.  He was also found to be + for COVID-19.      1.  Type 2 DM/ Hypoglycemia-    Hypoglycemia was likely a result of excessive dosing of home insulin.  Since he has not required any insulin this admission, would discharge on an oral agent with low hypoglycemic potential.  Since he has documented cardiomyopathy an SGTLT-2 inhibitor would be a good option such as Jardiance 10 mg daily or Farxiga 5 mg daily.  If this class is not covered, could use DPPIV-I such as Januvia 100 mg daily.  If he is not controlled on an oral agent alone, could add back low dose of basal insulin at home such as Lantus 10 units daily.    2.  Seizure-  likely had a breakthrough seizure from hypoglycemia.  Continue Keppra per neurology recommendations.  3.  CHF-   Continue lasix, entresto and spironolactone.    4.  COVID-19-  incidental finding and currently asymptomatic,.  Continue isolation precautions.

## 2022-01-01 NOTE — PROGRESS NOTE ADULT - SUBJECTIVE AND OBJECTIVE BOX
CC: follow up of DM    Interval HPI/ Overnight Events:  EEG complete and no abnormalities reported.   He has had no further episodes of hypoglycemia since admission.  Has been eating and has not required any sliding scale insulin.   Patient reports that he has lost a lot of weight and has been eating better since the original doses of insulin were prescribed.      MEDICATIONS  (STANDING):  aspirin enteric coated 81 milliGRAM(s) Oral daily  dextrose 40% Gel 15 Gram(s) Oral once  dextrose 5%. 1000 milliLiter(s) (50 mL/Hr) IV Continuous <Continuous>  dextrose 5%. 1000 milliLiter(s) (100 mL/Hr) IV Continuous <Continuous>  dextrose 50% Injectable 25 Gram(s) IV Push once  dextrose 50% Injectable 12.5 Gram(s) IV Push once  dextrose 50% Injectable 25 Gram(s) IV Push once  enoxaparin Injectable 40 milliGRAM(s) SubCutaneous daily  ferrous    sulfate 325 milliGRAM(s) Oral daily  furosemide   Injectable 40 milliGRAM(s) IV Push two times a day  gabapentin 100 milliGRAM(s) Oral three times a day  glucagon  Injectable 1 milliGRAM(s) IntraMuscular once  insulin lispro (ADMELOG) corrective regimen sliding scale   SubCutaneous at bedtime  insulin lispro (ADMELOG) corrective regimen sliding scale   SubCutaneous three times a day before meals  levETIRAcetam 750 milliGRAM(s) Oral two times a day  sacubitril 49 mG/valsartan 51 mG 1 Tablet(s) Oral two times a day  spironolactone 25 milliGRAM(s) Oral daily  ticagrelor 90 milliGRAM(s) Oral daily      Vitals:  Vital Signs Last 24 Hrs  T(C): 36.9 (01 Jan 2022 05:39), Max: 36.9 (31 Dec 2021 16:14)  T(F): 98.4 (01 Jan 2022 05:39), Max: 98.5 (31 Dec 2021 16:14)  HR: 84 (01 Jan 2022 05:39) (84 - 91)  BP: 160/77 (01 Jan 2022 05:39) (153/77 - 160/77)  BP(mean): 100 (01 Jan 2022 05:39) (100 - 104)  RR: 18 (01 Jan 2022 05:39) (18 - 18)  SpO2: 98% (01 Jan 2022 05:39) (95% - 100%)    PE:  Gen: AAO, NAD  HEENT:  sclera anicteric, MMM  Neck:  no thyromegaly, no LAD  CV:  nl S1 + S2, RRR, no m/r/g  Resp:  nl respiratory effort, CTA b/l  GI/ Abd: soft, NT/ ND, BS +  Neuro:  No tremor  MS:  no c/c/e, nl muscle tone  Skin:  no acanthosis  Psych: affect appropriate    LABS:  01-01    142  |  108<H>  |  14.1  ----------------------------<  141<H>  4.1   |  24.0  |  0.81    Ca    7.9<L>      01 Jan 2022 06:11  Phos  3.1     12-31  Mg     2.0     01-01    TPro  5.4<L>  /  Alb  2.6<L>  /  TBili  0.3<L>  /  DBili  x   /  AST  29  /  ALT  15  /  AlkPhos  77  01-01                            9.4    6.62  )-----------( 190      ( 01 Jan 2022 06:11 )             29.1        CAPILLARY BLOOD GLUCOSE  POCT Blood Glucose.: 129 mg/dL (01 Jan 2022 12:39)  POCT Blood Glucose.: 144 mg/dL (01 Jan 2022 09:26)  POCT Blood Glucose.: 138 mg/dL (31 Dec 2021 21:13)  POCT Blood Glucose.: 145 mg/dL (31 Dec 2021 17:19)

## 2022-01-01 NOTE — PROGRESS NOTE ADULT - ASSESSMENT
67 y/o M w/ PMH of HTN, CAD (s/p PCI x 1stent), CHF, IDDM, PVD, epilepsy came in w/ hypoglycemia and seizures.  Incidentally found to have COVID+.    Seizure likely triggered by hypoglycemia  - Glucose levels stable   - Will change to q4h accuchecks   - Hypoglycemia protocol on board  - [facility] (-) for acute pathology   - Ammonia levels WNL  - On keppra  - vEEG completed  - Epilepsy and endocrinology  recs appreciated    Acute decomponsated HFrEF / CAD s/p PCI (x1 stent)  - TTE 12/30/21 w/ LVEF 20-25%   - CXR on admission w/ effusions and significant pulm vasc congestion   - Maintain on IV lasix bid  - CARDIO following  - Monitor lytes and renal fxn while aggressively diuresed   - Daily weights, strict I/o's and fluid restrict   - Maintain K>4 and Mg>2  - Monitor on telemetry   - On entresto, ASA, brilinta, aldactone   - Not on toprol given allergy      Hypokalemia   - Supplemented  - Likely 2/2 ongoing aggressive diuresis   - Monitor closely and replete as needed       COVID +  - Incidental finding on admission  - Pt is unvaccinated  - Maintain on airborne isolation precautions  - Remains asymptomatic at this time      Normocytic Anemia  - Iron panel reviewed and started on po iron  - H/H stable and hemodynamically stable  - Monitor CBC and transfuse if Hb <8      HTN   - Resumed home medications   - On IV lasix for diuresis   - Titrate medications as needed       VTE ppx: Lovenox    Dispo: DC possibly in AM if optimized by cardio   Needs PT mary

## 2022-01-01 NOTE — EEG REPORT - NS EEG TEXT BOX
SHASTA MCKINLEY [id number]     Study Date: 		12-31-21 [id number]  01-01-22  x21:46hrs  --------------------------------------------------------------------------------------------------  History:  CC/ HPI Patient is a 66y old  Male who presents with a chief complaint of chf (01 Jan 2022 09:24)    MEDICATIONS  (STANDING):  aspirin enteric coated 81 milliGRAM(s) Oral daily  enoxaparin Injectable 40 milliGRAM(s) SubCutaneous daily  ferrous    sulfate 325 milliGRAM(s) Oral daily  furosemide   Injectable 40 milliGRAM(s) IV Push two times a day  gabapentin 100 milliGRAM(s) Oral three times a day  glucagon  Injectable 1 milliGRAM(s) IntraMuscular once  insulin lispro (ADMELOG) corrective regimen sliding scale   SubCutaneous at bedtime  insulin lispro (ADMELOG) corrective regimen sliding scale   SubCutaneous three times a day before meals  levETIRAcetam 750 milliGRAM(s) Oral two times a day  sacubitril 49 mG/valsartan 51 mG 1 Tablet(s) Oral two times a day  spironolactone 25 milliGRAM(s) Oral daily  ticagrelor 90 milliGRAM(s) Oral daily    --------------------------------------------------------------------------------------------------  Study Interpretation:    [[[Abbreviation Key:  PDR=alpha rhythm/posterior dominant rhythm. A-P=anterior posterior gradient.  Amplitude: ‘very low’:<20; ‘low’:20-50; ‘medium’:; ‘high’:>200uV.  Persistence for periodic/rhythmic patterns (% of epoch) ‘rare’:<1%; ‘occasional’:1-10%; ‘frequent’:10-50%; ‘abundant’:50-90%; ‘continuous’:>90%.  Persistence for sporadic discharges: ‘rare’:<1/hr; ‘occasional’:1/min-1/hr; ‘frequent’:>1/min; ‘abundant’:>1/10 sec.  GRDA=generalized rhythmic delta activity, LRDA=lateralized rhythmic delta activity, TIRDA=temporal intermittent rhythmic delta activity, FIRDA=frontal intermittent rhythmic activity. LPD=PLED=lateralized periodic discharges, GPD=generalized periodic discharges, BiPDs=BiPLEDs=bilateral independent periodic epileptiform discharges, SIRPID=stimulus induced rhythmic, periodic, or ictal appearing discharges.  Modifiers: +F=with fast component, +S=with spike component, +R=with rhythmic component.  S-B=burst suppression pattern.  Max=maximal. N1-drowsy, N2-stage II sleep, N3-slow wave sleep.  SSS/BETS=small sharp spikes/benign epileptiform transients of sleep. HV=hyperventilation, PS=photic stimulation]]]    FINDINGS:  The background was continuous, spontaneously variable and reactive.  During wakefulness, the posteriorly dominant rhythm was up to 30uV 8.5-9hz  There was diffuse/multifocal irregular theta and delta activity present.  GRDA present to 2hz  Delta at times more right frontal maximal (LRDA to 2hz)    Sleep Background:  Drowsiness was characterized by fragmentation, attenuation, and slowing of the background activity.    Sleep was characterized by the presence of vertex waves, symmetric spindles, and K-complexes.    Epileptiform Activity:   No interictal epileptiform discharges were present.    Events:  No clinical events were recorded.  No seizures were recorded.    Activation Procedures:   -Hyperventilation was not performed.    -Photic stimulation was not performed.    Artifacts:  Intermittent myogenic and external motion artifacts were noted.    ECG:  The heart rate on single channel ECG at baseline was predominantly near BPM = 70-80  -----------------------------------------------------------------------------------------------------    EEG Classification / Summary:  Abnormal EEG study  Moderate background slowing  GRDA present to 2hz  Delta at times more right frontal maximal (LRDA to 2hz)    -----------------------------------------------------------------------------------------------------    Clinical Impression:  Moderate multifocal cerebral dysfunction  Dysfunction more prominent intermittently over the right frontal region.  There were no definite spike wave abnormalities recorded, though LRDA in some instances can be associated with an increased risk for seizure. [BING Neurol. 2017;74(2):181–188]      -------------------------------------------------------------------------------------------------------  [facility] EEG Reading Room Ph#: [phone]  Epilepsy Answering Service after 5PM and before 8:30AM: Ph#: [phone]    Markie Nicolas M.D.   of Neurology, [facility] Epilepsy [place]	   SHASTA MCKINLEY [id number]     Study Date: 		12-31-21 [id number]  01-01-22  x21:46hrs  --------------------------------------------------------------------------------------------------  History:  CC/ HPI Patient is a 66y old  Male who presents with a chief complaint of chf (01 Jan 2022 09:24)    MEDICATIONS  (STANDING):  aspirin enteric coated 81 milliGRAM(s) Oral daily  enoxaparin Injectable 40 milliGRAM(s) SubCutaneous daily  ferrous    sulfate 325 milliGRAM(s) Oral daily  furosemide   Injectable 40 milliGRAM(s) IV Push two times a day  gabapentin 100 milliGRAM(s) Oral three times a day  glucagon  Injectable 1 milliGRAM(s) IntraMuscular once  insulin lispro (ADMELOG) corrective regimen sliding scale   SubCutaneous at bedtime  insulin lispro (ADMELOG) corrective regimen sliding scale   SubCutaneous three times a day before meals  levETIRAcetam 750 milliGRAM(s) Oral two times a day  sacubitril 49 mG/valsartan 51 mG 1 Tablet(s) Oral two times a day  spironolactone 25 milliGRAM(s) Oral daily  ticagrelor 90 milliGRAM(s) Oral daily    --------------------------------------------------------------------------------------------------  Study Interpretation:    [[[Abbreviation Key:  PDR=alpha rhythm/posterior dominant rhythm. A-P=anterior posterior gradient.  Amplitude: ‘very low’:<20; ‘low’:20-50; ‘medium’:; ‘high’:>200uV.  Persistence for periodic/rhythmic patterns (% of epoch) ‘rare’:<1%; ‘occasional’:1-10%; ‘frequent’:10-50%; ‘abundant’:50-90%; ‘continuous’:>90%.  Persistence for sporadic discharges: ‘rare’:<1/hr; ‘occasional’:1/min-1/hr; ‘frequent’:>1/min; ‘abundant’:>1/10 sec.  GRDA=generalized rhythmic delta activity, LRDA=lateralized rhythmic delta activity, TIRDA=temporal intermittent rhythmic delta activity, FIRDA=frontal intermittent rhythmic activity. LPD=PLED=lateralized periodic discharges, GPD=generalized periodic discharges, BiPDs=BiPLEDs=bilateral independent periodic epileptiform discharges, SIRPID=stimulus induced rhythmic, periodic, or ictal appearing discharges.  Modifiers: +F=with fast component, +S=with spike component, +R=with rhythmic component.  S-B=burst suppression pattern.  Max=maximal. N1-drowsy, N2-stage II sleep, N3-slow wave sleep.  SSS/BETS=small sharp spikes/benign epileptiform transients of sleep. HV=hyperventilation, PS=photic stimulation]]]    FINDINGS:  The background was continuous, spontaneously variable and reactive.  During wakefulness, the posteriorly dominant rhythm was up to 30uV 8.5-9hz  There was diffuse/multifocal irregular theta and delta activity present.  GRDA present to 2hz  Delta at times more right frontal maximal (LRDA to 2hz)    Sleep Background:  Drowsiness was characterized by fragmentation, attenuation, and slowing of the background activity.    Sleep was characterized by the presence of vertex waves, symmetric spindles, and K-complexes.    Epileptiform Activity:   No interictal epileptiform discharges were present.    Events:  No clinical events were recorded.  No seizures were recorded.    Activation Procedures:   -Hyperventilation was not performed.    -Photic stimulation was not performed.    Artifacts:  Intermittent myogenic and external motion artifacts were noted.    ECG:  The heart rate on single channel ECG at baseline was predominantly near BPM = 70-80  -----------------------------------------------------------------------------------------------------    EEG Classification / Summary:  Abnormal EEG study  Moderate background slowing  GRDA present to 2hz  Delta at times more right frontal maximal (LRDA to 2hz)    -----------------------------------------------------------------------------------------------------    Clinical Impression:  Moderate multifocal cerebral dysfunction  Dysfunction more prominent intermittently over the right frontal region.  There were no definite spike wave abnormalities recorded, though LRDA in some instances can be associated with an increased risk for seizure. [BING Neurol. 2017;74(2):181–188]      In absence of additional clinical concerns, recommend consideration for discontinuation of current EEG study with reconnection in future if warranted.      -------------------------------------------------------------------------------------------------------  [facility] EEG Reading Room Ph#: [phone]  Epilepsy Answering Service after 5PM and before 8:30AM: Ph#: [phone]    Markie Nicolas M.D.   of Neurology, [facility] Epilepsy [place]

## 2022-01-01 NOTE — CONSULT NOTE ADULT - CONSULT REQUESTED DATE/TIME
31-Dec-2021 13:46
01-Jan-2022 09:24
30-Dec-2021 16:00
Has not been taking lasix x 1 month with weight gain and progressive RODRIGUEZ, previous effusion s/p tap and drain. TTE today no e/o effusion  -continue lasix 80mg IV BID, goal net neg 2L/day (patient has not been taking lasix)  -continue losartan (not on BB - ?lung disease hx)  -cont ASA 81mg daily  -strict I&O  -daily weight

## 2022-01-01 NOTE — EEG REPORT - NS EEG TEXT BOX
SHASTA MCKINLEY [id number]     Study Date: 		01-01-22 [id number]  01-01-22  x5 hr  --------------------------------------------------------------------------------------------------  History:  CC/ HPI Patient is a 66y old  Male who presents with a chief complaint of chf (01 Jan 2022 09:24)    MEDICATIONS  (STANDING):  aspirin enteric coated 81 milliGRAM(s) Oral daily  enoxaparin Injectable 40 milliGRAM(s) SubCutaneous daily  ferrous    sulfate 325 milliGRAM(s) Oral daily  furosemide   Injectable 40 milliGRAM(s) IV Push two times a day  gabapentin 100 milliGRAM(s) Oral three times a day  glucagon  Injectable 1 milliGRAM(s) IntraMuscular once  insulin lispro (ADMELOG) corrective regimen sliding scale   SubCutaneous at bedtime  insulin lispro (ADMELOG) corrective regimen sliding scale   SubCutaneous three times a day before meals  levETIRAcetam 750 milliGRAM(s) Oral two times a day  sacubitril 49 mG/valsartan 51 mG 1 Tablet(s) Oral two times a day  spironolactone 25 milliGRAM(s) Oral daily  ticagrelor 90 milliGRAM(s) Oral daily    --------------------------------------------------------------------------------------------------  Study Interpretation:    [[[Abbreviation Key:  PDR=alpha rhythm/posterior dominant rhythm. A-P=anterior posterior gradient.  Amplitude: ‘very low’:<20; ‘low’:20-50; ‘medium’:; ‘high’:>200uV.  Persistence for periodic/rhythmic patterns (% of epoch) ‘rare’:<1%; ‘occasional’:1-10%; ‘frequent’:10-50%; ‘abundant’:50-90%; ‘continuous’:>90%.  Persistence for sporadic discharges: ‘rare’:<1/hr; ‘occasional’:1/min-1/hr; ‘frequent’:>1/min; ‘abundant’:>1/10 sec.  GRDA=generalized rhythmic delta activity, LRDA=lateralized rhythmic delta activity, TIRDA=temporal intermittent rhythmic delta activity, FIRDA=frontal intermittent rhythmic activity. LPD=PLED=lateralized periodic discharges, GPD=generalized periodic discharges, BiPDs=BiPLEDs=bilateral independent periodic epileptiform discharges, SIRPID=stimulus induced rhythmic, periodic, or ictal appearing discharges.  Modifiers: +F=with fast component, +S=with spike component, +R=with rhythmic component.  S-B=burst suppression pattern.  Max=maximal. N1-drowsy, N2-stage II sleep, N3-slow wave sleep.  SSS/BETS=small sharp spikes/benign epileptiform transients of sleep. HV=hyperventilation, PS=photic stimulation]]]    FINDINGS:  The background was continuous, spontaneously variable and reactive.  During wakefulness, the posteriorly dominant rhythm was up to 30uV 8.5-9hz  There was diffuse/multifocal irregular theta and delta activity present.  GRDA present to 2hz  Delta at times more right frontal maximal (LRDA to 2hz)    Sleep Background:  Drowsiness was characterized by fragmentation, attenuation, and slowing of the background activity.    Sleep was characterized by the presence of vertex waves, symmetric spindles, and K-complexes.    Epileptiform Activity:   No interictal epileptiform discharges were present.    Events:  No clinical events were recorded.  No seizures were recorded.    Activation Procedures:   -Hyperventilation was not performed.    -Photic stimulation was not performed.    Artifacts:  Intermittent myogenic and external motion artifacts were noted.    ECG:  The heart rate on single channel ECG at baseline was predominantly near BPM = 70-80  -----------------------------------------------------------------------------------------------------    EEG Classification / Summary:  Abnormal EEG study  Moderate background slowing  GRDA present to 2hz  Delta at times more right frontal maximal (LRDA to 2hz)    -----------------------------------------------------------------------------------------------------    Clinical Impression:  Moderate multifocal cerebral dysfunction  Dysfunction more prominent intermittently over the right frontal region.  There were no definite spike wave abnormalities recorded, though LRDA in some instances can be associated with an increased risk for seizure. [BING Neurol. 2017;74(2):181–188]      Bin Sutherland MD PGY-5  Epilepsy Fellow    This Preliminary report is based on fellow review. Final report pending attending review.    Reading Room: [phone]  On Call Service After Hours: [phone] SHASTA MCKINLEY [id number]     Study Date: 		01-01-22 [id number]  01-01-22  x5 hr  --------------------------------------------------------------------------------------------------  History:  CC/ HPI Patient is a 66y old  Male who presents with a chief complaint of chf (01 Jan 2022 09:24)    MEDICATIONS  (STANDING):  aspirin enteric coated 81 milliGRAM(s) Oral daily  enoxaparin Injectable 40 milliGRAM(s) SubCutaneous daily  ferrous    sulfate 325 milliGRAM(s) Oral daily  furosemide   Injectable 40 milliGRAM(s) IV Push two times a day  gabapentin 100 milliGRAM(s) Oral three times a day  glucagon  Injectable 1 milliGRAM(s) IntraMuscular once  insulin lispro (ADMELOG) corrective regimen sliding scale   SubCutaneous at bedtime  insulin lispro (ADMELOG) corrective regimen sliding scale   SubCutaneous three times a day before meals  levETIRAcetam 750 milliGRAM(s) Oral two times a day  sacubitril 49 mG/valsartan 51 mG 1 Tablet(s) Oral two times a day  spironolactone 25 milliGRAM(s) Oral daily  ticagrelor 90 milliGRAM(s) Oral daily    --------------------------------------------------------------------------------------------------  Study Interpretation:    [[[Abbreviation Key:  PDR=alpha rhythm/posterior dominant rhythm. A-P=anterior posterior gradient.  Amplitude: ‘very low’:<20; ‘low’:20-50; ‘medium’:; ‘high’:>200uV.  Persistence for periodic/rhythmic patterns (% of epoch) ‘rare’:<1%; ‘occasional’:1-10%; ‘frequent’:10-50%; ‘abundant’:50-90%; ‘continuous’:>90%.  Persistence for sporadic discharges: ‘rare’:<1/hr; ‘occasional’:1/min-1/hr; ‘frequent’:>1/min; ‘abundant’:>1/10 sec.  GRDA=generalized rhythmic delta activity, LRDA=lateralized rhythmic delta activity, TIRDA=temporal intermittent rhythmic delta activity, FIRDA=frontal intermittent rhythmic activity. LPD=PLED=lateralized periodic discharges, GPD=generalized periodic discharges, BiPDs=BiPLEDs=bilateral independent periodic epileptiform discharges, SIRPID=stimulus induced rhythmic, periodic, or ictal appearing discharges.  Modifiers: +F=with fast component, +S=with spike component, +R=with rhythmic component.  S-B=burst suppression pattern.  Max=maximal. N1-drowsy, N2-stage II sleep, N3-slow wave sleep.  SSS/BETS=small sharp spikes/benign epileptiform transients of sleep. HV=hyperventilation, PS=photic stimulation]]]    FINDINGS:  The background was continuous, spontaneously variable and reactive.  During wakefulness, the posteriorly dominant rhythm was up to 30uV 8.5-9hz  There was diffuse/multifocal irregular theta and delta activity present.  GRDA present to 2hz  Delta at times more right frontal maximal (LRDA to 2hz)    Sleep Background:  Drowsiness was characterized by fragmentation, attenuation, and slowing of the background activity.    Sleep was characterized by the presence of vertex waves, symmetric spindles, and K-complexes.    Epileptiform Activity:   No interictal epileptiform discharges were present.    Events:  No clinical events were recorded.  No seizures were recorded.    Activation Procedures:   -Hyperventilation was not performed.    -Photic stimulation was not performed.    Artifacts:  Intermittent myogenic and external motion artifacts were noted.    ECG:  The heart rate on single channel ECG at baseline was predominantly near BPM = 70-80  -----------------------------------------------------------------------------------------------------    EEG Classification / Summary:  Abnormal EEG study  Moderate background slowing  GRDA present to 2hz  Delta at times more right frontal maximal (LRDA to 2hz)    -----------------------------------------------------------------------------------------------------    Clinical Impression:  Moderate multifocal cerebral dysfunction  Dysfunction more prominent intermittently over the right frontal region.  There were no definite spike wave abnormalities recorded, though LRDA in some instances can be associated with an increased risk for seizure. [BING Neurol. 2017;74(2):181–188]      Bin Sutherland MD PGY-5  Epilepsy Fellow    Reading Room: [phone]  On Call Service After Hours: [phone]

## 2022-01-01 NOTE — PROGRESS NOTE ADULT - SUBJECTIVE AND OBJECTIVE BOX
Hospitalist Daily Progress Note    Chief Complaint:  Patient is a 66y old  Male who presents with a chief complaint of chf (01 Jan 2022 09:24)      SUBJECTIVE / OVERNIGHT EVENTS:  Patient was seen and examined at bedside. States to feel well. No active complaints.   Patient denies chest pain, SOB, abd pain, N/V, fever, chills, dysuria or any other complaints. All remainder ROS negative.     MEDICATIONS  (STANDING):  aspirin enteric coated 81 milliGRAM(s) Oral daily  dextrose 40% Gel 15 Gram(s) Oral once  dextrose 5%. 1000 milliLiter(s) (50 mL/Hr) IV Continuous <Continuous>  dextrose 5%. 1000 milliLiter(s) (100 mL/Hr) IV Continuous <Continuous>  dextrose 50% Injectable 25 Gram(s) IV Push once  dextrose 50% Injectable 12.5 Gram(s) IV Push once  dextrose 50% Injectable 25 Gram(s) IV Push once  enoxaparin Injectable 40 milliGRAM(s) SubCutaneous daily  ferrous    sulfate 325 milliGRAM(s) Oral daily  furosemide   Injectable 40 milliGRAM(s) IV Push two times a day  gabapentin 100 milliGRAM(s) Oral three times a day  glucagon  Injectable 1 milliGRAM(s) IntraMuscular once  insulin lispro (ADMELOG) corrective regimen sliding scale   SubCutaneous three times a day before meals  insulin lispro (ADMELOG) corrective regimen sliding scale   SubCutaneous at bedtime  levETIRAcetam 750 milliGRAM(s) Oral two times a day  sacubitril 49 mG/valsartan 51 mG 1 Tablet(s) Oral two times a day  spironolactone 25 milliGRAM(s) Oral daily  ticagrelor 90 milliGRAM(s) Oral daily    MEDICATIONS  (PRN):        I&O's Summary    31 Dec 2021 07:01  -  01 Jan 2022 07:00  --------------------------------------------------------  IN: 870 mL / OUT: 725 mL / NET: 145 mL    01 Jan 2022 07:01  -  01 Jan 2022 15:49  --------------------------------------------------------  IN: 0 mL / OUT: 300 mL / NET: -300 mL        PHYSICAL EXAM:  Vital Signs Last 24 Hrs  T(C): 36.9 (01 Jan 2022 05:39), Max: 36.9 (31 Dec 2021 16:14)  T(F): 98.4 (01 Jan 2022 05:39), Max: 98.5 (31 Dec 2021 16:14)  HR: 84 (01 Jan 2022 05:39) (84 - 91)  BP: 160/77 (01 Jan 2022 05:39) (153/77 - 160/77)  BP(mean): 100 (01 Jan 2022 05:39) (100 - 104)  RR: 18 (01 Jan 2022 05:39) (18 - 18)  SpO2: 98% (01 Jan 2022 05:39) (95% - 100%)      Constitutional: NAD, Resting  ENT: Supple, No JVD  Lungs: CTA B/L, Non-labored breathing  Cardio: RRR, S1/S2, No murmur  Abdomen: Soft, Nontender, Nondistended; Bowel sounds present  Extremities: No calf tenderness, No pitting edema  Musculoskeletal:   No clubbing or cyanosis of digits; no joint swelling or tenderness to palpation  Psych: Calm, cooperative affect appropriate  Neuro: Awake and alert, oriented x 4  Skin: No rashes; no palpable lesions    LABS:                        9.4    6.62  )-----------( 190      ( 01 Jan 2022 06:11 )             29.1     01-01    142  |  108<H>  |  14.1  ----------------------------<  141<H>  4.1   |  24.0  |  0.81    Ca    7.9<L>      01 Jan 2022 06:11  Phos  3.1     12-31  Mg     2.0     01-01    TPro  5.4<L>  /  Alb  2.6<L>  /  TBili  0.3<L>  /  DBili  x   /  AST  29  /  ALT  15  /  AlkPhos  77  01-01              Culture - Blood (collected 30 Dec 2021 12:18)  Source: .Blood Blood-Peripheral  Preliminary Report (01 Jan 2022 13:01):    No growth at 48 hours    Culture - Blood (collected 30 Dec 2021 12:17)  Source: .Blood Blood-Peripheral  Preliminary Report (01 Jan 2022 13:01):    No growth at 48 hours      CAPILLARY BLOOD GLUCOSE      POCT Blood Glucose.: 129 mg/dL (01 Jan 2022 12:39)  POCT Blood Glucose.: 144 mg/dL (01 Jan 2022 09:26)  POCT Blood Glucose.: 138 mg/dL (31 Dec 2021 21:13)  POCT Blood Glucose.: 145 mg/dL (31 Dec 2021 17:19)        RADIOLOGY REVIEWED

## 2022-01-02 NOTE — DISCHARGE NOTE NURSING/CASE MANAGEMENT/SOCIAL WORK - NSDCPEFALRISK_GEN_ALL_CORE
For information on Fall & Injury Prevention, visit: [email or web address] OR  [email or web address] OR  https://www.cdc.gov/steadi/patient.html

## 2022-01-02 NOTE — DISCHARGE NOTE PROVIDER - CARE PROVIDERS DIRECT ADDRESSES
,DirectAddress_Unknown,DirectAddress_Unknown,[email or web address],DirectAddress_Unknown

## 2022-01-02 NOTE — DISCHARGE NOTE PROVIDER - NSDCCPCAREPLAN_GEN_ALL_CORE_FT
PRINCIPAL DISCHARGE DIAGNOSIS  Diagnosis: Hypoglycemia  Assessment and Plan of Treatment: Secondary to increased insulin intake. For now please stop insulin and you will only be on oral mediations.         SECONDARY DISCHARGE DIAGNOSES  Diagnosis: 2019 novel coronavirus disease (COVID-19)  Assessment and Plan of Treatment: Isolation per CDC. You have tested POSITIVE for the novel coronavirus (COVID-19). Upon discharge, you must self-quarantine for 10 days, or until the Department of Health contacts you. Please wear a face mask if you are around other individuals. Try to avoid contact with house members, family, and friends for the duration of this quarantine. Please follow up with your primary care physician within 2-3 weeks of your discharge from the hospital. Please take all medications as prescribed. If you experience any worsening or recurrence of your symptoms, particularly worsening or high fever, shortness of breathe, extreme fatigue, or bloody cough please call 9-1-1 immediately or report to the nearest Emergency Department.    Diagnosis: Seizure  Assessment and Plan of Treatment: Seizure was secondary to likely hypoglycemia. Please take meds as prescribed.     PRINCIPAL DISCHARGE DIAGNOSIS  Diagnosis: Hypoglycemia  Assessment and Plan of Treatment: Secondary to increased insulin intake. For now please stop insulin and you will only be on oral mediations.          SECONDARY DISCHARGE DIAGNOSES  Diagnosis: 2019 novel coronavirus disease (COVID-19)  Assessment and Plan of Treatment: Isolation per CDC. You have tested POSITIVE for the novel coronavirus (COVID-19). Upon discharge, you must self-quarantine for 10 days, or until the Department of Health contacts you. Please wear a face mask if you are around other individuals. Try to avoid contact with house members, family, and friends for the duration of this quarantine. Please follow up with your primary care physician within 2-3 weeks of your discharge from the hospital. Please take all medications as prescribed. If you experience any worsening or recurrence of your symptoms, particularly worsening or high fever, shortness of breathe, extreme fatigue, or bloody cough please call 9-1-1 immediately or report to the nearest Emergency Department.    Diagnosis: Seizure  Assessment and Plan of Treatment: Seizure was secondary to likely hypoglycemia. Please take meds as prescribed.

## 2022-01-02 NOTE — DISCHARGE NOTE PROVIDER - CARE PROVIDER_API CALL
SUBJECTIVE:  Annette is a 58 year old female seen for several issues.      Postmenopausal vaginal bleeding -- pink when she wipes, abnormal US, history of HPV, seeing GYN soon.  She is not sexually active  More hair loss, night sweats, FHx thyroid disease.  Weight is up, is less active  Smokes 0.5 ppd, knows risks  No fever/chills/headache/chest pain/shorntess of breath/nausea/vomiting/bowel or urine changes    MA's notes reviewed and agreed with.  Medical, surgical, family and social history reviewed with patient, along with medication, allergies and health maintenance: Yes    Past Medical History:   Diagnosis Date   • Hyperlipidemia 9/24/2014   • Obesity, morbid, BMI 40.0-49.9 (CMS/[facility]) 9/24/2014   • Presbyopia 9/24/2014   • Tobacco abuse 9/24/2014        Family History   Problem Relation Age of Onset   • Hypertension Mother    • Diabetes Mother 40        type 2   • Thyroid Mother    • Diabetes Maternal Grandmother 80        type 2   • Stroke Paternal Grandmother 75   • Asthma Son    • Psychiatry Daughter         depression after loosing her father        Social History     Socioeconomic History   • Marital status:      Spouse name: Not on file   • Number of children: Not on file   • Years of education: Not on file   • Highest education level: Not on file   Occupational History   • Not on file   Tobacco Use   • Smoking status: Current Every Day Smoker     Packs/day: 0.50     Years: 35.00     Pack years: 17.50     Types: Cigarettes   • Smokeless tobacco: Never Used   Substance and Sexual Activity   • Alcohol use: Yes     Alcohol/week: 5.0 - 8.3 standard drinks     Types: 6 - 10 Standard drinks or equivalent per week     Comment: occ   • Drug use: No   • Sexual activity: Not Currently     Comment:    Other Topics Concern   • Not on file   Social History Narrative   • Not on file     Social Determinants of Health     Financial Resource Strain:    • Social Determinants: Financial Resource Strain: Not on  file   Food Insecurity:    • Social Determinants: Food Insecurity: Not on file   Transportation Needs:    • Lack of Transportation (Medical): Not on file   • Lack of Transportation (Non-Medical): Not on file   Physical Activity:    • Days of Exercise per Week: Not on file   • Minutes of Exercise per Session: Not on file   Stress:    • Social Determinants: Stress: Not on file   Social Connections:    • Social Determinants: Social Connections: Not on file   Intimate Partner Violence:    • Social Determinants: Intimate Partner Violence Past Fear: Not on file   • Social Determinants: Intimate Partner Violence Current Fear: Not on file        Past Surgical History:   Procedure Laterality Date   • Lasik  9/2008   • Oral surgery procedure      [place] Teeth Extraction 4   • Ovarian cyst removal  1994    Dr Eitan Jimenez--benign   • Removal of tonsils,<13 y/o          No outpatient encounter medications on file as of 12/23/2021.     No facility-administered encounter medications on file as of 12/23/2021.        REVIEW OF SYSTEMS:  13 point ROS negative except hPI    OBJECTIVE:   Patient wore a mask  Writer wore procedure mask  General: Pleasant female, NAD (no acute distress).  Vital Signs: Blood pressure 138/80, pulse 68, weight (!) 141.8 kg (312 lb 9.8 oz).    NECK: Thyroid not enlarged. No masses. No adenopathy.  RESPIRATORY: Lungs clear to percussion and auscultation.  CARDIOVASCULAR: Heart regular rhythm without murmur or thrills. No peripheral edema.  GASTROINTESTINAL abdomen soft, nontender without hepatosplenomegaly.  BS normal  LYMPHATICS: No adenopathy noted in the neck   MUSCULOSKELETAL: Gait is normal. No joint swelling or tenderness noted upper and lower extremities. Balance normal.  SKIN: No rashes or abnormal lesions seen or palpated.  NEURO: No focal deficits  PSYCH: Oriented x3. Mood and affect appropriate. Insight and judgment good.    ASSESSMENT/PLAN:   Annette was seen today for convey results.    Diagnoses  and all orders for this visit:    Postmenopausal vaginal bleeding  -     THYROID STIMULATING HORMONE; Future  -     FREE T3; Future  -     FREE T4; Future  -     COMPREHENSIVE METABOLIC PANEL; Future  -see GYN    Need for hepatitis C screening test  -     HEPATITIS C ANTIBODY WITH REFLEX; Future    Screening for colorectal cancer  -     COLOGUARD  -discussed options, risks and benefits    Encounter for screening for malignant neoplasm of breast, unspecified screening modality  -     MAMMO SCREENING W JUAN BILATERAL; Future    Screening for lipoid disorders  -     LIPID PANEL WITH REFLEX; Future    Hair loss  -     THYROID STIMULATING HORMONE; Future  -     FREE T3; Future  -     FREE T4; Future  -     COMPREHENSIVE METABOLIC PANEL; Future    Family history of thyroid disorder  -     THYROID STIMULATING HORMONE; Future  -     FREE T3; Future  -     FREE T4; Future    Night sweats  -     THYROID STIMULATING HORMONE; Future  -     FREE T3; Future  -     FREE T4; Future  -     COMPREHENSIVE METABOLIC PANEL; Future    BMI 45.0-49.9, adult (CMS/HCC)  -     VITAMIN D -25 HYDROXY; Future    Tobacco abuse  -encouraged cessation     Return if no improvement or worsening.      Raeann Murphy, DO   Breanna Hunt (NP; RN)  NP in Adult Health  [address] [place] [address]  Phone: [phone]  Fax: [phone]  Follow Up Time: 1 week    PCP,   Phone: (   )    -  Fax: (   )    -  Follow Up Time: 1-3 days    Gavino Fletcher (MD)  EndocrinologyMetabDiabetes; Internal Medicine  [address] [place] [address]  Phone: [phone]  Fax: [phone]  Follow Up Time: 1 week    Juan Diego Rider (DO)  Cardiovascular Disease; Internal Medicine  [address] [place] [address]  Phone: [phone]  Fax: [phone]  Follow Up Time: 1 week

## 2022-01-02 NOTE — DISCHARGE NOTE PROVIDER - NSDCMRMEDTOKEN_GEN_ALL_CORE_FT
aspirin 81 mg oral delayed release tablet: 1 tab(s) orally once a day  Brilinta (ticagrelor) 90 mg oral tablet: 1 tab(s) orally once a day  Entresto 49 mg-51 mg oral tablet: 1 tab(s) orally 2 times a day  furosemide 20 mg oral tablet: 1 tab(s) orally 2 times a day  gabapentin 100 mg oral capsule: 1 cap(s) orally 3 times a day  Jardiance 10 mg oral tablet: 1 tab(s) orally once a day (in the morning)   levETIRAcetam 750 mg oral tablet: 1 tab(s) orally 2 times a day  spironolactone 25 mg oral tablet: 1 tab(s) orally once a day

## 2022-01-02 NOTE — PROGRESS NOTE ADULT - SUBJECTIVE AND OBJECTIVE BOX
[place] [facility] - [facility], THE HEART [facility]                                   [address] [place] [address]                                                      PHONE: [phone]                                                         FAX: [phone]  [email or web address]  ---------------------------------------------------------------------------------------------------------------------------------    Overnight events/patient complaints:  no overnight events    Toprol-XL (Faint; Other)    MEDICATIONS  (STANDING):  aspirin enteric coated 81 milliGRAM(s) Oral daily  dextrose 40% Gel 15 Gram(s) Oral once  dextrose 5%. 1000 milliLiter(s) (50 mL/Hr) IV Continuous <Continuous>  dextrose 5%. 1000 milliLiter(s) (100 mL/Hr) IV Continuous <Continuous>  dextrose 50% Injectable 25 Gram(s) IV Push once  dextrose 50% Injectable 12.5 Gram(s) IV Push once  dextrose 50% Injectable 25 Gram(s) IV Push once  enoxaparin Injectable 40 milliGRAM(s) SubCutaneous daily  ferrous    sulfate 325 milliGRAM(s) Oral daily  gabapentin 100 milliGRAM(s) Oral three times a day  glucagon  Injectable 1 milliGRAM(s) IntraMuscular once  insulin lispro (ADMELOG) corrective regimen sliding scale   SubCutaneous at bedtime  insulin lispro (ADMELOG) corrective regimen sliding scale   SubCutaneous three times a day before meals  levETIRAcetam 750 milliGRAM(s) Oral two times a day  sacubitril 49 mG/valsartan 51 mG 1 Tablet(s) Oral two times a day  spironolactone 25 milliGRAM(s) Oral daily  ticagrelor 90 milliGRAM(s) Oral daily    MEDICATIONS  (PRN):      Vital Signs Last 24 Hrs  T(C): 36.8 (02 Jan 2022 08:30), Max: 36.8 (02 Jan 2022 08:30)  T(F): 98.3 (02 Jan 2022 08:30), Max: 98.3 (02 Jan 2022 08:30)  HR: 97 (02 Jan 2022 08:30) (84 - 97)  BP: 148/80 (02 Jan 2022 08:30) (100/58 - 167/93)  BP(mean): 112 (02 Jan 2022 05:46) (85 - 112)  RR: 19 (02 Jan 2022 08:30) (16 - 23)  SpO2: 94% (02 Jan 2022 08:30) (91% - 100%)  ICU Vital Signs Last 24 Hrs  SHASTA MCKINLEY  I&O's Detail    01 Jan 2022 07:01  -  02 Jan 2022 07:00  --------------------------------------------------------  IN:  Total IN: 0 mL    OUT:    Voided (mL): 300 mL  Total OUT: 300 mL    Total NET: -300 mL        Drug Dosing Weight  SHASTA SIEGELDARES      PHYSICAL EXAM:  General:  alert and cooperative.  HEENT: Head; normocephalic, atraumatic.  Eyes: Pupils reactive, cornea wnl.  Neck: Supple, no nodes adenopathy, no NVD or carotid bruit or thyromegaly.  CARDIOVASCULAR: Normal S1 and S2, No murmur, rub, gallop or lift.   LUNGS: coarse breath sounds bilaterally.  ABDOMEN: Soft, nontender without mass or organomegaly. bowel sounds normoactive.  EXTREMITIES: No clubbing, cyanosis or edema. Distal pulses wnl.   SKIN: warm and dry with normal turgor.  NEURO: Alert/oriented x 3/normal motor exam. No pathologic reflexes.    PSYCH: normal affect.        LABS:                        9.4    6.62  )-----------( 190      ( 01 Jan 2022 06:11 )             29.1     01-01    142  |  108<H>  |  14.1  ----------------------------<  141<H>  4.1   |  24.0  |  0.81    Ca    7.9<L>      01 Jan 2022 06:11  Mg     2.0     01-01    TPro  5.4<L>  /  Alb  2.6<L>  /  TBili  0.3<L>  /  DBili  x   /  AST  29  /  ALT  15  /  AlkPhos  77  01-01    HSASTA MCKINLEY            RADIOLOGY & ADDITIONAL STUDIES:    INTERPRETATION OF TELEMETRY (personally reviewed): no events    ECG:  NS @ 87 lateral T wave inversions    ECHO: < from: TTE Echo Complete w/o Contrast w/ Doppler (12.30.21 @ 22:43) >  Summary:   1. Left ventricular ejection fraction, by visual estimation, is 20 to   25%.   2. Severely decreased global left ventricular systolic function.   Spectral Doppler shows pseudonormal pattern of left ventricular   myocardial filling (Grade II diastolic dysfunction). Elevated mean left   atrial pressure.   3. The right ventricle is not well visualized   4. Mild to moderate mitral valve regurgitation.   5. The mitral valve leaflets are tethered which is due to reduced   systolic function and elevated LVDP.   6. Mild-moderate tricuspid regurgitation.   7. Mild aortic regurgitation.   8. Sclerotic aortic valve with normal opening.   9. Estimated pulmonary artery systolic pressure is 56.5 mmHg assuming a   right atrial pressure of 8 mmHg, which is consistent with moderate   pulmonary hypertension.    MD Luis Enrique Electronically signed on 12/31/2021 at 11:19:55 AM    < end of copied text >           Assessment and Plan:  In summary, SHASTA MCKINLEY is an 66y Male with past medical history significant for HTN, HLD, DM, ICM LVEF 20-25% sp ICD, ILR, 1V CABG, Isaak to pLCx, mild MR pw seizure likely secondary to hypoglycemia incidentally noted to have COVID positive and pt unvaccinated and with pleural effusions consistent with acute on chronic HFrEF.    1) DC IV lasix. Can restart home diuretics. Await AM labs  2) cw entresto, aldactone. Would not start Farxiga in the setting of recent hypoglycemia  3) COVID treatment as per primary team  4) Likely DC home soon

## 2022-01-02 NOTE — PHYSICAL THERAPY INITIAL EVALUATION ADULT - IMPAIRMENTS CONTRIBUTING TO GAIT DEVIATIONS, PT EVAL
minor balance deficits noted on turns, No physical assist needed, Pt reports family can provide supervision at home PRN./impaired balance

## 2022-01-02 NOTE — DISCHARGE NOTE NURSING/CASE MANAGEMENT/SOCIAL WORK - PATIENT PORTAL LINK FT
You can access the FollowMyHealth Patient Portal offered by [facility] by registering at the following website: [email or web address]. By joining [facility]’s FollowMyHealth portal, you will also be able to view your health information using other applications (apps) compatible with our system.

## 2022-01-02 NOTE — PHYSICAL THERAPY INITIAL EVALUATION ADULT - GENERAL OBSERVATIONS, REHAB EVAL
67 y/o M w/ PMH of HTN, CAD (s/p PCI x 1stent), CHF, IDDM, PVD, epilepsy came in w/ hypoglycemia and seizures.  Incidentally found to have COVID+

## 2022-01-02 NOTE — DISCHARGE NOTE PROVIDER - ATTENDING DISCHARGE PHYSICAL EXAMINATION:
PHYSICAL EXAM:  Vital Signs Last 24 Hrs  T(F): 98.3 (02 Jan 2022 08:30), Max: 98.3 (02 Jan 2022 08:30)  HR: 97 (02 Jan 2022 08:30) (87 - 97)  BP: 148/80 (02 Jan 2022 08:30) (100/58 - 167/93)  RR: 19 (02 Jan 2022 08:30) (16 - 23)  SpO2: 94% (02 Jan 2022 08:30) (93% - 100%)    GENERAL: NAD, Resting in bed  Eyes: EOMI, PERRLA  ENMT: Conjunctiva and sclera clear; supple neck, No JVD  Cardiovascular: S1,S2, RRR, No murmur  Respiratory: CTA B/L, Non-labored breathing  GI: Bowel sounds present; Soft, Nontender, Nondistended. No hepatomegaly  Genitourinary: Deferred  Skin:  no breakdowns, ulcers or discharge, No rashes or lesions  Neurology: Alert & Oriented X3, non-focal and spontaneous movements of all extremities, CN 2 to 12 grossly intact   Psych: Appropriate mood and affect, calm, pleasant, Responds appropriately to questions

## 2022-01-02 NOTE — DISCHARGE NOTE NURSING/CASE MANAGEMENT/SOCIAL WORK - NSDCFUADDAPPT_GEN_ALL_CORE_FT
Please follow up with you primary care physician: Dr Adrienne Brian in [place] [phone]  The pt uses [facility] on [address] in [place] [phone] and the patient  does not have any difficulties in obtaining his prescriptions/medications.

## 2022-01-02 NOTE — DISCHARGE NOTE PROVIDER - PROVIDER TOKENS
PROVIDER:[TOKEN:[[id number]],FOLLOWUP:[1 week]],FREE:[LAST:[PCP],PHONE:[(   )    -],FAX:[(   )    -],FOLLOWUP:[1-3 days]],PROVIDER:[TOKEN:[[id number]],FOLLOWUP:[1 week]],PROVIDER:[TOKEN:[[id number]],FOLLOWUP:[1 week]]

## 2022-01-02 NOTE — PHYSICAL THERAPY INITIAL EVALUATION ADULT - ADDITIONAL COMMENTS
Pt lives in a private home with his wife and children. 6 steps to enter with handrails, 12 steps inside with handrails. Pt was ambulatory PTA with a RW. Family provided assist with ADLs and supervision for ambulation PTA per pt.

## 2022-01-02 NOTE — DISCHARGE NOTE PROVIDER - HOSPITAL COURSE
66y male with history of HTN, CAD s/p stent x1, CHF, MI, DM on Basaglar and Novolog, PVD, Epilepsy presented to ED with c/o per family- AM glucose was 40 after which they gave him BF to eat and after that his glucose was 57. Then he had witnessed Sz. EMS witnessed second seizure and gave Versed and D 50 x 1.   Per ED Pt awake and talking  but not completely 'alert'. Incidental finding of COVID +.He is unvaccinated. Patient was seen by neurology for his seizure. Likely secondary to hypoglycemia. Patient underwent EEG and was maintained on keppra. Seen by Endocrinology. Recommended to hold Insulin and place on PO meds. No insulin for now and will be reassess outpatient. Patient was also seen by cardiology for heart failure and was diuresied. Now patient is placed on oral diuretics. Optimized for DC by neurology, endocrinology and cardiology. PT evaluated patient and recommended home. Medically stable for DC home.    66y male with history of HTN, CAD s/p stent x1, CHF, MI, DM on Basaglar and Novolog, PVD, Epilepsy presented to ED with c/o per family- AM glucose was 40 after which they gave him BF to eat and after that his glucose was 57. Then he had witnessed Sz. EMS witnessed second seizure and gave Versed and D 50 x 1. Per ED Pt awake and talking  but not completely 'alert'. Incidental finding of COVID +.He is unvaccinated. Patient was seen by neurology for his seizure. Likely secondary to hypoglycemia. Patient underwent EEG and was maintained on keppra. Seen by Endocrinology. Recommended to hold Insulin and place on PO meds. No insulin for now and will be reassess outpatient. Patient was also seen by cardiology for heart failure and was diuresied. Now patient is placed on oral diuretics. Optimized for DC by neurology, endocrinology and cardiology. PT evaluated patient and recommended home. Medically stable for DC home.    66y male with history of HTN, CAD s/p stent x1, CHF, MI, DM on Basaglar and Novolog, PVD, Epilepsy presented to ED with c/o per family- AM glucose was 40 after which they gave him BF to eat and after that his glucose was 57. Then he had witnessed Sz. EMS witnessed second seizure and gave Versed and D 50 x 1. Per ED Pt awake and talking  but not completely 'alert'. Incidental finding of COVID +.He is unvaccinated. Patient was seen by neurology for his seizure. Likely secondary to hypoglycemia. Patient underwent EEG and was maintained on keppra. Seen by Endocrinology. Recommended to hold Insulin and place on PO meds. Jardiance is covered by patients insurance. No insulin for now and will be reassess outpatient. Patient was also seen by cardiology for heart failure and was diuresied. Now patient is placed on oral diuretics. Optimized for DC by neurology, endocrinology and cardiology. PT evaluated patient and recommended home. Medically stable for DC home.

## 2022-01-12 NOTE — ED STATDOCS - NS ED MD DISPO DISCHARGE
Home Cellcept Counseling:  I discussed with the patient the risks of mycophenolate mofetil including but not limited to infection/immunosuppression, GI upset, hypokalemia, hypercholesterolemia, bone marrow suppression, lymphoproliferative disorders, malignancy, GI ulceration/bleed/perforation, colitis, interstitial lung disease, kidney failure, progressive multifocal leukoencephalopathy, and birth defects.  The patient understands that monitoring is required including a baseline creatinine and regular CBC testing. In addition, patient must alert us immediately if symptoms of infection or other concerning signs are noted.

## 2022-03-24 NOTE — H&P ADULT - RS GEN PE MLT RESP DETAILS PC
normal/airway patent/good air movement/respirations non-labored/no rales/no rhonchi/no wheezes/diminished breath sounds, L/diminished breath sounds, R

## 2022-03-24 NOTE — DISCHARGE NOTE FOR THE EXPIRED PATIENT - HOSPITAL COURSE
67 y/o male pmhx HTN, ischemic cardiomyopathy EF 20-25%, s/p ICD, s/p ILR, hx CABGx1, DM2, HLD BIBEMS after sustaining out of hospital cardiac arrest. As per wife, patient was sitting on the couch when he became unresponsive w/ some atypical movements of the upper extremity. As per report he was found to be in PEA arrest, converted to Vtahc/Vfib s/p shock x1 w/ ROSC. En route patient lost shocked again, and ROSC was achieved on first pulse check on arrival to ER. He was started on levophed for BP support, given amiodarone load and started on infusion. Shortly after arrival to MICU patient became bradycardic w/ increasing pressor requirements.  Amiodarone was stopped. Patient soon after went into PEA arrest. No ACLS as patient was DNR. Family was notified.

## 2022-03-24 NOTE — PATIENT PROFILE ADULT - FALL HARM RISK - UNIVERSAL INTERVENTIONS
Bed in lowest position, wheels locked, appropriate side rails in place/Call bell, personal items and telephone in reach/Instruct patient to call for assistance before getting out of bed or chair/Non-slip footwear when patient is out of bed/[place] to call system/Physically safe environment - no spills, clutter or unnecessary equipment/Purposeful Proactive Rounding/Room/bathroom lighting operational, light cord in reach

## 2022-03-24 NOTE — ED PROVIDER NOTE - INTERNATIONAL TRAVEL
Unable to Assess Complex Repair And Burow's Graft Text: The defect edges were debeveled with a #15 scalpel blade.  The primary defect was closed partially with a complex linear closure.  Given the location of the defect, shape of the defect, the proximity to free margins and the presence of a standing cone deformity a Burow's graft was deemed most appropriate to repair the remaining defect.  The graft was trimmed to fit the size of the remaining defect.  The graft was then placed in the primary defect, oriented appropriately, and sutured into place.

## 2022-03-24 NOTE — ED ADULT NURSE REASSESSMENT NOTE - NS ED NURSE REASSESS COMMENT FT1
Assumed care of pt at 19:45 from ALMA Dawkins in [facility], charting as noted. Pt awaiting MICU bed assignment. MICU MD Levine and PA at bedside for evaluation, to order CT head/neck/pelvis. Pt remains on ventilator, RR-20 PEEP-10 FiO2-100% TV-420. ETT 7.0 23cm @ lip, OG secured to cheek. Norepinephrine 0.07mcg/kg, Amiodarone maint dose 1mg/min. Pt remains on CM in NSR, SPO2 100%. Triple lumen rt femoral line in place, MICU PA at bedside for A-line placement. As per MD Levine, DNR as per family. Pt currently receiving no sedation, eyes opening spontaneously.

## 2022-03-24 NOTE — ED ADULT NURSE NOTE - OBJECTIVE STATEMENT
as per EMS pt was on cough started coughing and became unresponsive CPR was started and EMS found pt in PEA and then went into Vtach pt was shocked x2 with EMS, in ED pt coded x3

## 2022-03-24 NOTE — H&P ADULT - ASSESSMENT
65 y/o male pmhx HTN, ischemic cardiomyopathy EF 20-25%, s/p ICD, s/p ILR, hx CABGx1, DM2, HLD BIBEMS after sustaining out of hospital cardiac arrest.    Assessment:  1. Cardiac arrest  2. Acute hypoxic respiratory failure   3. VTach/ Vfib  4. MING  5. Anoxic injury   6. Ischemic cardiomyopathy   7. Shock     Plan:  Neuro: CT head/ C-spine pending, family reports fall during EMS eval/ CPR initiation. Plan for EEG. Poor mentation post arrest, not requiring sedation likely degree of anoxic injury given prolonged down time.   CV: Cardiac arrest, unclear etiology. Plan for hypothermia protocol depending on CT head. Vtach/Vfib s/p shocked x2.  Started on amiodarone infusion after amio load. Will need ICD interogated. Trend Trops. EKG w/out any ST segment changes.   - Shock, actively titrating levophed to maintain MAP >65. Formal TTE  - Ischemic cardiomyopathy holding Entresto BB in setting of shock. Restart DAPT/ high intensity statin   Resp: Intubated on AC/VC, actively tittrating FIO2 to maintain O2 sat >92%. High minute ventilation to assist in correcting acidosis. Needs repeat ABG.  Lung protective ventilation Vent bundle in place.   GI: NPO. PPI daily  Renal: MING, due to ATN. Gibson Catheter inserted. Strict I&Os. Avoid Nephrotoxins  ID: No evidence of infectious etiology. Will observe off abx for now.  CT chest/abd/pelvis pending   Heme: DAPT. DVT ppx w/ heparin subq    Family was updated in the ER. DNR  Poor prognosis

## 2022-03-24 NOTE — H&P ADULT - HISTORY OF PRESENT ILLNESS
65 y/o male pmhx HTN, ischemic cardiomyopathy EF 20-25%, s/p ICD, s/p ILR, hx CABGx1, DM2, HLD BIBEMS after sustaining out of hospital cardiac arrest. As per wife, patient was sitting on the couch when he became unresponsive w/ some atypical movements of the upper extremity. As per report he was found to be in PEA arrest, converted to Vtahc/Vfib s/p shock x1 w/ ROSC. En route patient lost shocked again, and ROSC was achieved on first pulse check on arrival to ER. He was started on levophed for BP support, given amiodarone load and started on infusion.  Labs significant Crt 1.4, CO2 17, BNP 13k  67 y/o male pmhx HTN, ischemic cardiomyopathy EF 20-25%, s/p ICD, s/p ILR, hx CABGx1, DM2, HLD BIBEMS after sustaining out of hospital cardiac arrest. As per wife, patient was sitting on the couch when he became unresponsive w/ some atypical movements of the upper extremity. As per report he was found to be in PEA arrest, converted to Vtahc/Vfib s/p shock x1 w/ ROSC. En route patient lost shocked again, and ROSC was achieved on first pulse check on arrival to ER. He was started on levophed for BP support, given amiodarone load and started on infusion.  Labs significant Crt 1.4, CO2 17, BNP 13k. Admitted to MICU

## 2022-03-24 NOTE — GOALS OF CARE CONVERSATION - ADVANCED CARE PLANNING - CONVERSATION DETAILS
Discussed in detail patients current condition post cardiac arrest and prognosis given prolonged down time. Family understands any chance of meaningful recovery is unlikely given current neuro exam. They have decided that if patients were to go into cardiac arrest again they would not want CPR and let him pass peacefully.  MOLST form completed.

## 2022-03-24 NOTE — ED PROVIDER NOTE - ATTENDING CONTRIBUTION TO CARE
I have personally provided 60 minutes of critical care time exclusive of time spent on separately billable procedures. Time includes review of laboratory data, radiology results, discussion with consultants, and monitoring for potential decompensation. Interventions were performed as documented above    Adult male with extensive cardiac hx BIB EMS with out of hospital cardiac arrest; ROSC achieved in ED: on exam, unresponsive, no evidence of trauma; pupils fixed and dilated; cool extremities; equal BS with mechanical ventilation; abd soft non distended; supportive care initiated with pressors and amio; d/w MICU and family

## 2022-03-24 NOTE — ED PROVIDER NOTE - CLINICAL SUMMARY MEDICAL DECISION MAKING FREE TEXT BOX
66M BIBEMS as a cardiac arrest after presumably choking on food.  Initial rhythm PEA which converted to vtach, after which pt. was defibrillated.  ROSC achieved at 5:26PM initially, but pulses lost right outside ED again.  Rhythm at the time was vtach again, pt. was defibrillated and arrived w/ CPR.  ROSC achieved during first pulse check.  4 rounds of epi and one amp of bicarb given by EMS.  Pt. lost pulses once more, CPR started, and ROSC during next pulse check.  Amiodarone 150mg and second amp BICARB administered.  Levophed started.  EKG unchanged from prior, but does show TWI in V4-V6.  MICU consulted.  Will reassess and admit.  Pt. was intubated by EMS.
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
## 2022-03-24 NOTE — ED PROVIDER NOTE - OBJECTIVE STATEMENT
66M w/ PMHx of HTN, CAD, HLD, depression BIBEMS as a cardiac arrest.  PT. ate some food and was sitting on his couch when he started coughing, became apneic and fell down on the floor.  Was found to be pulseless at 5:10PM.  EMS arrived at 5:15PM and started chest compressions.  Initial rhythm was PEA, which converted to vtach.  Defibrillated and four rounds of epi w/ one amp of bicarb given.  ROSC achieved at 5:26PM.  Pt. lost pulses outside the hospital and was found to be in vtach again, after which he was defibrillated.  Arrived w/ CPR and during first pulse check, ROSC achieved.

## 2022-03-24 NOTE — ED ADULT TRIAGE NOTE - CHIEF COMPLAINT QUOTE
BIBEMS s/p witnessed cardiac arrest by family. As per EMS CPR started at 1710. Pt intubated prior to arrival. ACLS protocol followed. Code Blue activated.

## 2022-03-24 NOTE — ED PROVIDER NOTE - PHYSICAL EXAMINATION
GENERAL: Cardiac arrest.  HEENT: NC/AT.  LUNGS: CTAB.  CARDIAC: Tachycardia.  NEURO: Intubated.  SKIN: Warm and dry. No rash.

## 2022-03-25 LAB
CULTURE RESULTS: NO GROWTH — SIGNIFICANT CHANGE UP
SPECIMEN SOURCE: SIGNIFICANT CHANGE UP

## 2022-05-27 ENCOUNTER — APPOINTMENT (OUTPATIENT)
Dept: NEUROLOGY | Facility: CLINIC | Age: 67
End: 2022-05-27

## 2022-06-01 NOTE — DISCHARGE NOTE PROVIDER - CARE PROVIDER_API CALL
Intake signed by Dr. Goss, appt in August.  To PSR for scheduling.  GP   Autumn Garcia)  Internal Medicine  [address] [place] [id number]  Phone: [phone]  Fax: [phone]  Follow Up Time:    Autumn Garcia)  Internal Medicine  [address] [place] [address] [id number]  Phone: [phone]  Fax: [phone]  Follow Up Time:     Mario Barker; MPH)  Cardiology; Internal Medicine  [address] [place] [id number]  Phone: [phone]  Fax: [phone]  Follow Up Time: 2 weeks

## 2022-08-04 NOTE — ED PROVIDER NOTE - EYES, MLM
Clear bilaterally, pupils equal, round and reactive to light. Fluconazole Counseling:  Patient counseled regarding adverse effects of fluconazole including but not limited to headache, diarrhea, nausea, upset stomach, liver function test abnormalities, taste disturbance, and stomach pain.  There is a rare possibility of liver failure that can occur when taking fluconazole.  The patient understands that monitoring of LFTs and kidney function test may be required, especially at baseline. The patient verbalized understanding of the proper use and possible adverse effects of fluconazole.  All of the patient's questions and concerns were addressed.

## 2023-03-21 NOTE — H&P ADULT - HISTORY OF PRESENT ILLNESS
66 y/o M with Hx of HTN, HLD, DM, CAD s/p cardiac cath 4/14/10 with confirmed LAD occlusion now s/p single-vessel CABG LIMA to LAD 4/2010 with Dr. Lafleur, cardiomyopathy with CHF with recent admission to [facility] for systolic heart failure and started on Entresto and lasix 20mg BID, Pt underwent NST as out patient which showed global hypokinesis of the remaining wall segments and small reversible defect of mild intensity of the inferoseptal wall consistent with per-infarct ischemia, she was having worsening SOB, he came in yesterday for left heart catheterization to assess progression of CAD,  and possible cause of cardiomyopathy, patient is s/p LHC via right groin approach  previous bypass NUNN to LAD patent, s/p One ELIZ to Circumflex  done RFV/RFA, he was noted to have Severe LV dysfunction w/ moderate-severe MR and had HUSEYIN done today showed EF of 20 to 25%, Multiple left ventricular regional wall motion abnormalities exist, Severely reduced RV systolic function, Severely enlarged LA with SEC in LA and LAAA w/o thrombus, Moderate pleural effusion in the left lateral region, his chest Xray revealed pulmonary edema, cardiology requested for the admission for further diuresis.   [Negative] : Heme/Lymph

## 2023-10-15 NOTE — PATIENT PROFILE ADULT - NSPRONUTRITIONRISK_GEN_A_NUR
Discharge Instructions  Chest Pain    You have been seen today for chest pain or discomfort.  At this time, your doctor has found no signs that your chest pain is due to a serious or life-threatening condition, (or you have declined more testing and/or admission to the hospital). However, sometimes there is a serious problem that does not show up right away. Your evaluation today may not be complete and you may need further testing and evaluation. You need to follow-up with your regular doctor within 3 days.    Return to the Emergency Department if:    Your chest pain changes, gets worse, starts to happen more often, or comes with less activity.  You are short of breath.  You get very weak or tired.  You pass out or faint.  You have any new symptoms, like fever, cough, numb legs, or you cough up blood  You have anything else that worries you.    Until you follow-up with your regular doctor please do the following:    If you have questions, contact your regular doctor.    If your doctor today has told you to follow-up with your regular doctor, it is very important that you make an appointment with your clinic and go to the appointment.  If you do not follow-up with your primary doctor, it may result in missing an important development which could result in permanent injury or disability and/or lasting pain.  If there is any problem keeping your appointment, call your doctor or return to the Emergency Department.      Remember that you can always come back to the Emergency Department if you are not able to see your regular doctor in the amount of time listed above, if you get any new symptoms, or if there is anything that worries you.     No indicators present

## 2023-10-23 NOTE — ED STATDOCS - PRINCIPAL DIAGNOSIS
Problem: Plan of Care - These are the overarching goals to be used throughout the patient stay.    Goal: Plan of Care Review  Description: The Plan of Care Review/Shift note should be completed every shift.  The Outcome Evaluation is a brief statement about your assessment that the patient is improving, declining, or no change.  This information will be displayed automatically on your shift note.  Outcome: Progressing     Problem: Swallowing Impairment  Goal: Optimal Eating/Swallowing without Aspiration  Outcome: Progressing   Goal Outcome Evaluation:  Afebrile. Disoriented to time. Received radiation this morning. Blood pressure 147/102 and received norvasc. Bp recheck 139/76. Albuterol nebs per RT. No pain. No nausea. Taking pills with applesauce. Neuro's are intact. Sat in the chair. Worked with therapy. Bottom is red but blanchable. Using barrier paste. Purewick in place. Tiny amount of stool noted and received senna and miralax. Assist x 2 with the lift.  at bedside and is very supportive.                           Wound
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
## 2024-02-20 NOTE — CHART NOTE - NSCHARTNOTESELECT_GEN_ALL_CORE
Off Service Note You can access the FollowMyHealth Patient Portal offered by [facility] by registering at the following website: [email or web address]. By joining [facility]’s FollowMyHealth portal, you will also be able to view your health information using other applications (apps) compatible with our system.

## 2024-03-06 NOTE — ED PROVIDER NOTE - UNABLE TO OBTAIN
----- Message from Filipe Hernandez MD sent at 3/6/2024  1:36 PM EST -----  Please let the patient know that labs that were drawn at previous visit were stable except her hemoglobin A1c which is in the prediabetic range at 5.7, LDL cholesterol which is significantly increased to 162, and ALT which is liver function number which is increased from 39-51 but has been higher in the past.  For the elevated A1c and cholesterol levels I would recommend diet and lifestyle modifications which include avoidance of foods that are high in fat, sugar, and carbohydrates.  Focus on trying to eat leafy green vegetables, foods that are high in fiber, and lean meats.  Patient should also try to get at least 150 minutes of cardiovascular exercise weekly.  This may also improve liver function numbers as well.  Patient should also avoid acetaminophen and alcohol use to improve liver function numbers.  Will recheck labs at patient's follow-up visit and if her numbers do not show improvement we will discuss further treatment options at that time.  If patient has any questions they can contact me.    
Urgent need for Intervention

## 2024-08-18 NOTE — ED PROVIDER NOTE - PHYSICAL EXAMINATION
Gen: no acute distress  Head: normocephalic, atraumatic  EENT: EOMI  Lung: no increased work of breathing, CTABL, no wheezing, rales, rhonchi  CV: normal s1/s2, rrr, 2+ radial pulses b/l  Abd: mild TTP in all 4 quadrants; no rebound tenderness or guarding  MSK: No edema, no visible deformities, full range of motion in all 4 extremities  Neuro: No focal neurologic deficits  Psych: normal affect
43.56

## 2024-10-15 NOTE — ED ADULT TRIAGE NOTE - BEFAST ARM SIDE DRIFT
"Chief Complaint   Patient presents with    RECHECK     1 year follow up      /75 (BP Location: Left arm, Cuff Size: Adult Regular)   Pulse 78   Ht 1.676 m (5' 6\")   Wt 74.5 kg (164 lb 3 oz)   LMP  (LMP Unknown)   SpO2 98%   BMI 26.50 kg/m    Joyce Israel, GIDEON on 10/15/2024 at 9:32 AM    "
No